# Patient Record
Sex: MALE | Race: WHITE | Employment: UNEMPLOYED | URBAN - METROPOLITAN AREA
[De-identification: names, ages, dates, MRNs, and addresses within clinical notes are randomized per-mention and may not be internally consistent; named-entity substitution may affect disease eponyms.]

---

## 2021-01-01 ENCOUNTER — OFFICE VISIT (OUTPATIENT)
Dept: FAMILY MEDICINE CLINIC | Age: 56
End: 2021-01-01
Payer: COMMERCIAL

## 2021-01-01 ENCOUNTER — HOSPITAL ENCOUNTER (OUTPATIENT)
Dept: PHYSICAL THERAPY | Age: 56
Setting detail: THERAPIES SERIES
Discharge: HOME | End: 2021-11-08
Payer: COMMERCIAL

## 2021-01-01 ENCOUNTER — HOSPITAL ENCOUNTER (OUTPATIENT)
Dept: PHYSICAL THERAPY | Age: 56
Setting detail: THERAPIES SERIES
Discharge: HOME | End: 2021-12-07
Payer: COMMERCIAL

## 2021-01-01 ENCOUNTER — TELEPHONE (OUTPATIENT)
Dept: FAMILY MEDICINE CLINIC | Age: 56
End: 2021-01-01

## 2021-01-01 ENCOUNTER — HOSPITAL ENCOUNTER (OUTPATIENT)
Dept: PHYSICAL THERAPY | Age: 56
Discharge: HOME | End: 2021-12-27

## 2021-01-01 ENCOUNTER — HOSPITAL ENCOUNTER (OUTPATIENT)
Dept: PHYSICAL THERAPY | Age: 56
Setting detail: THERAPIES SERIES
Discharge: HOME | End: 2021-12-16
Payer: COMMERCIAL

## 2021-01-01 ENCOUNTER — HOSPITAL ENCOUNTER (OUTPATIENT)
Dept: PHYSICAL THERAPY | Age: 56
Setting detail: THERAPIES SERIES
Discharge: HOME | End: 2021-11-19
Payer: COMMERCIAL

## 2021-01-01 ENCOUNTER — OFFICE VISIT (OUTPATIENT)
Dept: GASTROENTEROLOGY | Age: 56
End: 2021-01-01
Payer: COMMERCIAL

## 2021-01-01 ENCOUNTER — HOSPITAL ENCOUNTER (OUTPATIENT)
Dept: PHYSICAL THERAPY | Age: 56
Setting detail: THERAPIES SERIES
Discharge: HOME | End: 2021-11-16
Payer: COMMERCIAL

## 2021-01-01 ENCOUNTER — VIRTUAL VISIT (OUTPATIENT)
Dept: FAMILY MEDICINE CLINIC | Age: 56
End: 2021-01-01
Payer: COMMERCIAL

## 2021-01-01 ENCOUNTER — HOSPITAL ENCOUNTER (OUTPATIENT)
Dept: PHYSICAL THERAPY | Age: 56
Setting detail: THERAPIES SERIES
Discharge: HOME | End: 2021-11-10
Payer: COMMERCIAL

## 2021-01-01 ENCOUNTER — HOSPITAL ENCOUNTER (OUTPATIENT)
Dept: PHYSICAL THERAPY | Age: 56
Discharge: HOME | End: 2021-12-03

## 2021-01-01 ENCOUNTER — HOSPITAL ENCOUNTER (OUTPATIENT)
Dept: PHYSICAL THERAPY | Age: 56
Setting detail: THERAPIES SERIES
Discharge: HOME | End: 2021-11-30
Payer: COMMERCIAL

## 2021-01-01 ENCOUNTER — HOSPITAL ENCOUNTER (OUTPATIENT)
Dept: PHYSICAL THERAPY | Age: 56
Setting detail: THERAPIES SERIES
Discharge: HOME | End: 2021-12-20
Payer: COMMERCIAL

## 2021-01-01 ENCOUNTER — HOSPITAL ENCOUNTER (OUTPATIENT)
Dept: SLEEP CENTER | Age: 56
Discharge: HOME | End: 2021-11-02
Payer: COMMERCIAL

## 2021-01-01 ENCOUNTER — HOSPITAL ENCOUNTER (OUTPATIENT)
Dept: PHYSICAL THERAPY | Age: 56
Setting detail: THERAPIES SERIES
Discharge: HOME | End: 2021-11-23
Payer: COMMERCIAL

## 2021-01-01 ENCOUNTER — HOSPITAL ENCOUNTER (OUTPATIENT)
Dept: SLEEP CENTER | Age: 56
Discharge: HOME | End: 2021-12-01
Payer: COMMERCIAL

## 2021-01-01 ENCOUNTER — PREP FOR PROCEDURE (OUTPATIENT)
Dept: GASTROENTEROLOGY | Age: 56
End: 2021-01-01

## 2021-01-01 ENCOUNTER — HOSPITAL ENCOUNTER (OUTPATIENT)
Dept: PHYSICAL THERAPY | Age: 56
Setting detail: THERAPIES SERIES
Discharge: HOME | End: 2021-12-09
Payer: COMMERCIAL

## 2021-01-01 VITALS
HEIGHT: 69 IN | DIASTOLIC BLOOD PRESSURE: 78 MMHG | OXYGEN SATURATION: 98 % | TEMPERATURE: 97.1 F | SYSTOLIC BLOOD PRESSURE: 122 MMHG | BODY MASS INDEX: 27.91 KG/M2 | WEIGHT: 188.4 LBS | HEART RATE: 75 BPM

## 2021-01-01 VITALS — WEIGHT: 182 LBS | BODY MASS INDEX: 25.48 KG/M2 | HEIGHT: 71 IN

## 2021-01-01 VITALS
WEIGHT: 186.6 LBS | OXYGEN SATURATION: 96 % | BODY MASS INDEX: 26.03 KG/M2 | SYSTOLIC BLOOD PRESSURE: 112 MMHG | TEMPERATURE: 97.2 F | DIASTOLIC BLOOD PRESSURE: 72 MMHG | HEART RATE: 61 BPM

## 2021-01-01 VITALS
BODY MASS INDEX: 25.63 KG/M2 | DIASTOLIC BLOOD PRESSURE: 70 MMHG | TEMPERATURE: 97.2 F | HEART RATE: 90 BPM | SYSTOLIC BLOOD PRESSURE: 122 MMHG | OXYGEN SATURATION: 99 % | WEIGHT: 183.8 LBS

## 2021-01-01 VITALS
TEMPERATURE: 97.5 F | HEART RATE: 75 BPM | DIASTOLIC BLOOD PRESSURE: 72 MMHG | OXYGEN SATURATION: 92 % | WEIGHT: 185.6 LBS | BODY MASS INDEX: 25.98 KG/M2 | HEIGHT: 71 IN | SYSTOLIC BLOOD PRESSURE: 112 MMHG

## 2021-01-01 DIAGNOSIS — S22.080D OPEN WEDGE COMPRESSION FRACTURE OF T11 VERTEBRA WITH ROUTINE HEALING, SUBSEQUENT ENCOUNTER: ICD-10-CM

## 2021-01-01 DIAGNOSIS — F33.1 MODERATE EPISODE OF RECURRENT MAJOR DEPRESSIVE DISORDER (HCC): ICD-10-CM

## 2021-01-01 DIAGNOSIS — R19.5 POSITIVE FIT (FECAL IMMUNOCHEMICAL TEST): Primary | ICD-10-CM

## 2021-01-01 DIAGNOSIS — Z12.12 SCREENING FOR COLORECTAL CANCER: ICD-10-CM

## 2021-01-01 DIAGNOSIS — K92.1 BLOOD IN STOOL: ICD-10-CM

## 2021-01-01 DIAGNOSIS — G40.109 SYMPTOMATIC LOCALIZATION-RELATED EPILEPSY (HCC): ICD-10-CM

## 2021-01-01 DIAGNOSIS — E78.49 OTHER HYPERLIPIDEMIA: ICD-10-CM

## 2021-01-01 DIAGNOSIS — F33.1 MODERATE EPISODE OF RECURRENT MAJOR DEPRESSIVE DISORDER (HCC): Primary | ICD-10-CM

## 2021-01-01 DIAGNOSIS — I65.21 STENOSIS OF RIGHT CAROTID ARTERY: ICD-10-CM

## 2021-01-01 DIAGNOSIS — Z00.00 ENCOUNTER FOR WELL ADULT EXAM WITHOUT ABNORMAL FINDINGS: Primary | ICD-10-CM

## 2021-01-01 DIAGNOSIS — Q04.8 MALFORMATION OF CORTICAL DEVELOPMENT OF BRAIN (HCC): ICD-10-CM

## 2021-01-01 DIAGNOSIS — Z12.11 SCREENING FOR COLORECTAL CANCER: ICD-10-CM

## 2021-01-01 DIAGNOSIS — Z01.818 PREOP TESTING: ICD-10-CM

## 2021-01-01 DIAGNOSIS — S22.080D: ICD-10-CM

## 2021-01-01 DIAGNOSIS — G47.33 OSA (OBSTRUCTIVE SLEEP APNEA): ICD-10-CM

## 2021-01-01 DIAGNOSIS — Z11.4 SCREENING FOR HIV (HUMAN IMMUNODEFICIENCY VIRUS): ICD-10-CM

## 2021-01-01 DIAGNOSIS — Z13.1 SCREENING FOR DIABETES MELLITUS: ICD-10-CM

## 2021-01-01 DIAGNOSIS — R19.5 POSITIVE FIT (FECAL IMMUNOCHEMICAL TEST): ICD-10-CM

## 2021-01-01 DIAGNOSIS — J30.9 ALLERGIC RHINITIS, UNSPECIFIED SEASONALITY, UNSPECIFIED TRIGGER: ICD-10-CM

## 2021-01-01 DIAGNOSIS — Z11.59 NEED FOR HEPATITIS C SCREENING TEST: ICD-10-CM

## 2021-01-01 DIAGNOSIS — G89.29 CHRONIC RIGHT SHOULDER PAIN: ICD-10-CM

## 2021-01-01 DIAGNOSIS — U07.1 COVID-19: Primary | ICD-10-CM

## 2021-01-01 DIAGNOSIS — G47.30 SLEEP APNEA, UNSPECIFIED TYPE: ICD-10-CM

## 2021-01-01 DIAGNOSIS — G47.33 OSA (OBSTRUCTIVE SLEEP APNEA): Primary | ICD-10-CM

## 2021-01-01 DIAGNOSIS — Z96.89 S/P PLACEMENT OF VNS (VAGUS NERVE STIMULATION) DEVICE: ICD-10-CM

## 2021-01-01 DIAGNOSIS — R53.83 FATIGUE, UNSPECIFIED TYPE: ICD-10-CM

## 2021-01-01 DIAGNOSIS — M25.511 CHRONIC RIGHT SHOULDER PAIN: ICD-10-CM

## 2021-01-01 LAB
A/G RATIO: 2 (ref 1.1–2.2)
ALBUMIN SERPL-MCNC: 4.8 G/DL (ref 3.4–5)
ALP BLD-CCNC: 95 U/L (ref 40–129)
ALT SERPL-CCNC: 13 U/L (ref 10–40)
ANION GAP SERPL CALCULATED.3IONS-SCNC: 11 MMOL/L (ref 3–16)
AST SERPL-CCNC: 26 U/L (ref 15–37)
BASOPHILS ABSOLUTE: 0 K/UL (ref 0–0.2)
BASOPHILS RELATIVE PERCENT: 0.9 %
BILIRUB SERPL-MCNC: 0.6 MG/DL (ref 0–1)
BUN BLDV-MCNC: 16 MG/DL (ref 7–20)
CALCIUM SERPL-MCNC: 9.5 MG/DL (ref 8.3–10.6)
CHLORIDE BLD-SCNC: 110 MMOL/L (ref 99–110)
CHOLESTEROL, TOTAL: 264 MG/DL (ref 0–199)
CO2: 21 MMOL/L (ref 21–32)
CONTROL: ABNORMAL
CREAT SERPL-MCNC: 1.3 MG/DL (ref 0.9–1.3)
EOSINOPHILS ABSOLUTE: 0.3 K/UL (ref 0–0.6)
EOSINOPHILS RELATIVE PERCENT: 6.6 %
ESTIMATED AVERAGE GLUCOSE: 105.4 MG/DL
GFR AFRICAN AMERICAN: >60
GFR NON-AFRICAN AMERICAN: 57
GLOBULIN: 2.4 G/DL
GLUCOSE BLD-MCNC: 89 MG/DL (ref 70–99)
HBA1C MFR BLD: 5.3 %
HCT VFR BLD CALC: 42.9 % (ref 40.5–52.5)
HDLC SERPL-MCNC: 51 MG/DL (ref 40–60)
HEMOCCULT STL QL: POSITIVE
HEMOGLOBIN: 14.4 G/DL (ref 13.5–17.5)
HEPATITIS C ANTIBODY INTERPRETATION: NORMAL
LDL CHOLESTEROL CALCULATED: 188 MG/DL
LYMPHOCYTES ABSOLUTE: 1.5 K/UL (ref 1–5.1)
LYMPHOCYTES RELATIVE PERCENT: 29.1 %
MCH RBC QN AUTO: 32.9 PG (ref 26–34)
MCHC RBC AUTO-ENTMCNC: 33.6 G/DL (ref 31–36)
MCV RBC AUTO: 97.8 FL (ref 80–100)
MONOCYTES ABSOLUTE: 0.4 K/UL (ref 0–1.3)
MONOCYTES RELATIVE PERCENT: 7.9 %
NEUTROPHILS ABSOLUTE: 2.8 K/UL (ref 1.7–7.7)
NEUTROPHILS RELATIVE PERCENT: 55.5 %
PDW BLD-RTO: 13.1 % (ref 12.4–15.4)
PLATELET # BLD: 195 K/UL (ref 135–450)
PMV BLD AUTO: 7.8 FL (ref 5–10.5)
POTASSIUM SERPL-SCNC: 4.4 MMOL/L (ref 3.5–5.1)
RBC # BLD: 4.38 M/UL (ref 4.2–5.9)
SODIUM BLD-SCNC: 142 MMOL/L (ref 136–145)
STATUS: NORMAL
T4 FREE: 1 NG/DL (ref 0.9–1.8)
TOTAL PROTEIN: 7.2 G/DL (ref 6.4–8.2)
TRIGL SERPL-MCNC: 126 MG/DL (ref 0–150)
TSH REFLEX FT4: 4.44 UIU/ML (ref 0.27–4.2)
VLDLC SERPL CALC-MCNC: 25 MG/DL
WBC # BLD: 5 K/UL (ref 4–11)

## 2021-01-01 PROCEDURE — 36415 COLL VENOUS BLD VENIPUNCTURE: CPT | Performed by: FAMILY MEDICINE

## 2021-01-01 PROCEDURE — 97016 VASOPNEUMATIC DEVICE THERAPY: CPT

## 2021-01-01 PROCEDURE — 99211 OFF/OP EST MAY X REQ PHY/QHP: CPT

## 2021-01-01 PROCEDURE — 97140 MANUAL THERAPY 1/> REGIONS: CPT

## 2021-01-01 PROCEDURE — 3017F COLORECTAL CA SCREEN DOC REV: CPT | Performed by: NURSE PRACTITIONER

## 2021-01-01 PROCEDURE — 97110 THERAPEUTIC EXERCISES: CPT

## 2021-01-01 PROCEDURE — 99204 OFFICE O/P NEW MOD 45 MIN: CPT | Performed by: NURSE PRACTITIONER

## 2021-01-01 PROCEDURE — 1036F TOBACCO NON-USER: CPT | Performed by: FAMILY MEDICINE

## 2021-01-01 PROCEDURE — G8419 CALC BMI OUT NRM PARAM NOF/U: HCPCS | Performed by: NURSE PRACTITIONER

## 2021-01-01 PROCEDURE — 3017F COLORECTAL CA SCREEN DOC REV: CPT | Performed by: FAMILY MEDICINE

## 2021-01-01 PROCEDURE — 1036F TOBACCO NON-USER: CPT | Performed by: NURSE PRACTITIONER

## 2021-01-01 PROCEDURE — G8419 CALC BMI OUT NRM PARAM NOF/U: HCPCS | Performed by: FAMILY MEDICINE

## 2021-01-01 PROCEDURE — G8427 DOCREV CUR MEDS BY ELIG CLIN: HCPCS | Performed by: FAMILY MEDICINE

## 2021-01-01 PROCEDURE — 99214 OFFICE O/P EST MOD 30 MIN: CPT | Performed by: FAMILY MEDICINE

## 2021-01-01 PROCEDURE — 99396 PREV VISIT EST AGE 40-64: CPT | Performed by: FAMILY MEDICINE

## 2021-01-01 PROCEDURE — 97112 NEUROMUSCULAR REEDUCATION: CPT

## 2021-01-01 PROCEDURE — 99213 OFFICE O/P EST LOW 20 MIN: CPT | Performed by: FAMILY MEDICINE

## 2021-01-01 PROCEDURE — G8427 DOCREV CUR MEDS BY ELIG CLIN: HCPCS | Performed by: NURSE PRACTITIONER

## 2021-01-01 PROCEDURE — 97161 PT EVAL LOW COMPLEX 20 MIN: CPT

## 2021-01-01 PROCEDURE — 99204 OFFICE O/P NEW MOD 45 MIN: CPT | Performed by: FAMILY MEDICINE

## 2021-01-01 PROCEDURE — G8484 FLU IMMUNIZE NO ADMIN: HCPCS | Performed by: NURSE PRACTITIONER

## 2021-01-01 PROCEDURE — 99212 OFFICE O/P EST SF 10 MIN: CPT | Performed by: FAMILY MEDICINE

## 2021-01-01 PROCEDURE — G8484 FLU IMMUNIZE NO ADMIN: HCPCS | Performed by: FAMILY MEDICINE

## 2021-01-01 PROCEDURE — 95810 POLYSOM 6/> YRS 4/> PARAM: CPT

## 2021-01-01 PROCEDURE — 99386 PREV VISIT NEW AGE 40-64: CPT | Performed by: FAMILY MEDICINE

## 2021-01-01 PROCEDURE — 82274 ASSAY TEST FOR BLOOD FECAL: CPT | Performed by: FAMILY MEDICINE

## 2021-01-01 RX ORDER — SODIUM CHLORIDE 9 MG/ML
25 INJECTION, SOLUTION INTRAVENOUS PRN
Status: CANCELLED | OUTPATIENT
Start: 2021-01-01

## 2021-01-01 RX ORDER — ATORVASTATIN CALCIUM 40 MG/1
40 TABLET, FILM COATED ORAL DAILY
Qty: 90 TABLET | Refills: 1 | Status: SHIPPED | OUTPATIENT
Start: 2021-01-01 | End: 2021-01-01 | Stop reason: SDUPTHER

## 2021-01-01 RX ORDER — SODIUM CHLORIDE 0.9 % (FLUSH) 0.9 %
5-40 SYRINGE (ML) INJECTION EVERY 12 HOURS SCHEDULED
Status: CANCELLED | OUTPATIENT
Start: 2021-01-01

## 2021-01-01 RX ORDER — SERTRALINE HYDROCHLORIDE 25 MG/1
25 TABLET, FILM COATED ORAL DAILY
Qty: 30 TABLET | Refills: 1 | Status: SHIPPED | OUTPATIENT
Start: 2021-01-01 | End: 2021-01-01

## 2021-01-01 RX ORDER — CLONAZEPAM 0.5 MG/1
1 TABLET, ORALLY DISINTEGRATING ORAL 2 TIMES DAILY PRN
COMMUNITY
Start: 2021-01-01

## 2021-01-01 RX ORDER — SERTRALINE HYDROCHLORIDE 25 MG/1
TABLET, FILM COATED ORAL
Qty: 30 TABLET | Refills: 1 | Status: SHIPPED | OUTPATIENT
Start: 2021-01-01 | End: 2021-01-01 | Stop reason: SDUPTHER

## 2021-01-01 RX ORDER — POLYETHYLENE GLYCOL 3350 17 G/17G
238 POWDER, FOR SOLUTION ORAL ONCE
Qty: 238 G | Refills: 0 | Status: SHIPPED | OUTPATIENT
Start: 2021-01-01 | End: 2021-01-01

## 2021-01-01 RX ORDER — SERTRALINE HYDROCHLORIDE 25 MG/1
25 TABLET, FILM COATED ORAL DAILY
Qty: 90 TABLET | Refills: 0 | Status: SHIPPED | OUTPATIENT
Start: 2021-01-01

## 2021-01-01 RX ORDER — CLOBAZAM 20 MG/1
1 TABLET ORAL 2 TIMES DAILY
COMMUNITY
Start: 2021-01-01

## 2021-01-01 RX ORDER — ATORVASTATIN CALCIUM 80 MG/1
80 TABLET, FILM COATED ORAL DAILY
Qty: 90 TABLET | Refills: 2 | Status: SHIPPED | OUTPATIENT
Start: 2021-01-01

## 2021-01-01 RX ORDER — SODIUM CHLORIDE 0.9 % (FLUSH) 0.9 %
5-40 SYRINGE (ML) INJECTION PRN
Status: CANCELLED | OUTPATIENT
Start: 2021-01-01

## 2021-01-01 RX ORDER — BISACODYL 5 MG
TABLET, DELAYED RELEASE (ENTERIC COATED) ORAL
Qty: 4 TABLET | Refills: 0 | Status: SHIPPED | OUTPATIENT
Start: 2021-01-01

## 2021-01-01 RX ORDER — SODIUM CHLORIDE, SODIUM LACTATE, POTASSIUM CHLORIDE, CALCIUM CHLORIDE 600; 310; 30; 20 MG/100ML; MG/100ML; MG/100ML; MG/100ML
INJECTION, SOLUTION INTRAVENOUS CONTINUOUS
Status: CANCELLED | OUTPATIENT
Start: 2021-01-01

## 2021-01-01 SDOH — ECONOMIC STABILITY: TRANSPORTATION INSECURITY
IN THE PAST 12 MONTHS, HAS LACK OF TRANSPORTATION KEPT YOU FROM MEETINGS, WORK, OR FROM GETTING THINGS NEEDED FOR DAILY LIVING?: NO

## 2021-01-01 SDOH — ECONOMIC STABILITY: FOOD INSECURITY: WITHIN THE PAST 12 MONTHS, THE FOOD YOU BOUGHT JUST DIDN'T LAST AND YOU DIDN'T HAVE MONEY TO GET MORE.: NEVER TRUE

## 2021-01-01 SDOH — ECONOMIC STABILITY: TRANSPORTATION INSECURITY
IN THE PAST 12 MONTHS, HAS THE LACK OF TRANSPORTATION KEPT YOU FROM MEDICAL APPOINTMENTS OR FROM GETTING MEDICATIONS?: NO

## 2021-01-01 SDOH — ECONOMIC STABILITY: FOOD INSECURITY: WITHIN THE PAST 12 MONTHS, YOU WORRIED THAT YOUR FOOD WOULD RUN OUT BEFORE YOU GOT MONEY TO BUY MORE.: NEVER TRUE

## 2021-01-01 ASSESSMENT — ENCOUNTER SYMPTOMS
EYE PAIN: 0
ABDOMINAL PAIN: 0
NAUSEA: 0
EYE DISCHARGE: 0
VOMITING: 0
COUGH: 0
NAUSEA: 0
VOMITING: 0
BLOOD IN STOOL: 1
COUGH: 1
CONSTIPATION: 0
VOMITING: 0
WHEEZING: 0
ABDOMINAL PAIN: 0
SHORTNESS OF BREATH: 0
SHORTNESS OF BREATH: 0
CONSTIPATION: 0
TROUBLE SWALLOWING: 0
NAUSEA: 0
COLOR CHANGE: 0
DIARRHEA: 0
VOMITING: 0
COUGH: 0
DIARRHEA: 0
VOMITING: 0
CONSTIPATION: 0
EYE PAIN: 0
SHORTNESS OF BREATH: 0
APNEA: 1
COUGH: 0
BACK PAIN: 1
COUGH: 0
SHORTNESS OF BREATH: 0
ABDOMINAL PAIN: 0
DIARRHEA: 0
EYE PAIN: 0
DIARRHEA: 0
DIARRHEA: 0
TROUBLE SWALLOWING: 0
BACK PAIN: 1
SHORTNESS OF BREATH: 0
ABDOMINAL PAIN: 0
BACK PAIN: 1

## 2021-01-01 ASSESSMENT — PAIN DESCRIPTION - DIRECTION: RADIATING_TOWARDS: ELBOW

## 2021-01-01 ASSESSMENT — PAIN DESCRIPTION - LOCATION: LOCATION: SHOULDER

## 2021-01-01 ASSESSMENT — COLUMBIA-SUICIDE SEVERITY RATING SCALE - C-SSRS
2. HAVE YOU ACTUALLY HAD ANY THOUGHTS OF KILLING YOURSELF?: NO
1. WITHIN THE PAST MONTH, HAVE YOU WISHED YOU WERE DEAD OR WISHED YOU COULD GO TO SLEEP AND NOT WAKE UP?: NO
6. HAVE YOU EVER DONE ANYTHING, STARTED TO DO ANYTHING, OR PREPARED TO DO ANYTHING TO END YOUR LIFE?: NO

## 2021-01-01 ASSESSMENT — SLEEP AND FATIGUE QUESTIONNAIRES
HOW LIKELY ARE YOU TO NOD OFF OR FALL ASLEEP WHILE WATCHING TV: 1
HOW LIKELY ARE YOU TO NOD OFF OR FALL ASLEEP WHILE SITTING QUIETLY AFTER LUNCH WITHOUT ALCOHOL: 3
HOW LIKELY ARE YOU TO NOD OFF OR FALL ASLEEP WHEN YOU ARE A PASSENGER IN A CAR FOR AN HOUR WITHOUT A BREAK: 3
HOW LIKELY ARE YOU TO NOD OFF OR FALL ASLEEP WHILE SITTING INACTIVE IN A PUBLIC PLACE: 0
HOW LIKELY ARE YOU TO NOD OFF OR FALL ASLEEP WHILE LYING DOWN TO REST IN THE AFTERNOON WHEN CIRCUMSTANCES PERMIT: 3
HOW LIKELY ARE YOU TO NOD OFF OR FALL ASLEEP WHILE SITTING QUIETLY AFTER LUNCH WITHOUT ALCOHOL: 3
HOW LIKELY ARE YOU TO NOD OFF OR FALL ASLEEP IN A CAR, WHILE STOPPED FOR A FEW MINUTES IN TRAFFIC: 0
HOW LIKELY ARE YOU TO NOD OFF OR FALL ASLEEP WHILE SITTING AND TALKING TO SOMEONE: 0
HOW LIKELY ARE YOU TO NOD OFF OR FALL ASLEEP WHILE SITTING AND TALKING TO SOMEONE: 0
HOW LIKELY ARE YOU TO NOD OFF OR FALL ASLEEP WHILE SITTING INACTIVE IN A PUBLIC PLACE: 0
HOW LIKELY ARE YOU TO NOD OFF OR FALL ASLEEP IN A CAR, WHILE STOPPED FOR A FEW MINUTES IN TRAFFIC: 0
HOW LIKELY ARE YOU TO NOD OFF OR FALL ASLEEP WHILE SITTING AND READING: 2
HOW LIKELY ARE YOU TO NOD OFF OR FALL ASLEEP WHILE LYING DOWN TO REST IN THE AFTERNOON WHEN CIRCUMSTANCES PERMIT: 3
ESS TOTAL SCORE: 12
ESS TOTAL SCORE: 12
NECK CIRCUMFERENCE (INCHES): 16
HOW LIKELY ARE YOU TO NOD OFF OR FALL ASLEEP WHEN YOU ARE A PASSENGER IN A CAR FOR AN HOUR WITHOUT A BREAK: 3
NECK CIRCUMFERENCE (INCHES): 16
HOW LIKELY ARE YOU TO NOD OFF OR FALL ASLEEP WHILE SITTING AND READING: 2
HOW LIKELY ARE YOU TO NOD OFF OR FALL ASLEEP WHILE WATCHING TV: 1

## 2021-01-01 ASSESSMENT — PATIENT HEALTH QUESTIONNAIRE - PHQ9
SUM OF ALL RESPONSES TO PHQ QUESTIONS 1-9: 17
10. IF YOU CHECKED OFF ANY PROBLEMS, HOW DIFFICULT HAVE THESE PROBLEMS MADE IT FOR YOU TO DO YOUR WORK, TAKE CARE OF THINGS AT HOME, OR GET ALONG WITH OTHER PEOPLE: 0
9. THOUGHTS THAT YOU WOULD BE BETTER OFF DEAD, OR OF HURTING YOURSELF: 0
3. TROUBLE FALLING OR STAYING ASLEEP: 3
6. FEELING BAD ABOUT YOURSELF - OR THAT YOU ARE A FAILURE OR HAVE LET YOURSELF OR YOUR FAMILY DOWN: 3
SUM OF ALL RESPONSES TO PHQ QUESTIONS 1-9: 17
4. FEELING TIRED OR HAVING LITTLE ENERGY: 3
5. POOR APPETITE OR OVEREATING: 2
SUM OF ALL RESPONSES TO PHQ9 QUESTIONS 1 & 2: 6
SUM OF ALL RESPONSES TO PHQ QUESTIONS 1-9: 17
2. FEELING DOWN, DEPRESSED OR HOPELESS: 3
1. LITTLE INTEREST OR PLEASURE IN DOING THINGS: 3
7. TROUBLE CONCENTRATING ON THINGS, SUCH AS READING THE NEWSPAPER OR WATCHING TELEVISION: 0
8. MOVING OR SPEAKING SO SLOWLY THAT OTHER PEOPLE COULD HAVE NOTICED. OR THE OPPOSITE, BEING SO FIGETY OR RESTLESS THAT YOU HAVE BEEN MOVING AROUND A LOT MORE THAN USUAL: 0

## 2021-01-01 ASSESSMENT — PAIN DESCRIPTION - PAIN TYPE: TYPE: CHRONIC PAIN

## 2021-01-01 ASSESSMENT — PAIN DESCRIPTION - FREQUENCY: FREQUENCY: INTERMITTENT

## 2021-01-01 ASSESSMENT — PAIN - FUNCTIONAL ASSESSMENT: PAIN_FUNCTIONAL_ASSESSMENT: PREVENTS OR INTERFERES SOME ACTIVE ACTIVITIES AND ADLS

## 2021-01-01 ASSESSMENT — PAIN DESCRIPTION - ONSET: ONSET: GRADUAL

## 2021-01-01 ASSESSMENT — SOCIAL DETERMINANTS OF HEALTH (SDOH): HOW HARD IS IT FOR YOU TO PAY FOR THE VERY BASICS LIKE FOOD, HOUSING, MEDICAL CARE, AND HEATING?: NOT HARD AT ALL

## 2021-01-01 ASSESSMENT — PAIN DESCRIPTION - ORIENTATION: ORIENTATION: RIGHT

## 2021-10-21 PROBLEM — F33.1 MODERATE EPISODE OF RECURRENT MAJOR DEPRESSIVE DISORDER (HCC): Status: ACTIVE | Noted: 2021-01-22

## 2021-10-21 PROBLEM — M25.511 CHRONIC RIGHT SHOULDER PAIN: Status: ACTIVE | Noted: 2021-01-01

## 2021-10-21 PROBLEM — G89.29 CHRONIC RIGHT SHOULDER PAIN: Status: ACTIVE | Noted: 2021-01-01

## 2021-10-21 PROBLEM — Z96.89 S/P PLACEMENT OF VNS (VAGUS NERVE STIMULATION) DEVICE: Status: ACTIVE | Noted: 2021-01-21

## 2021-10-21 PROBLEM — I65.21 STENOSIS OF RIGHT CAROTID ARTERY: Status: ACTIVE | Noted: 2021-01-21

## 2021-10-21 PROBLEM — F39 MOOD DISORDER (HCC): Status: ACTIVE | Noted: 2021-01-22

## 2021-10-21 PROBLEM — E78.49 OTHER HYPERLIPIDEMIA: Status: ACTIVE | Noted: 2021-01-01

## 2021-10-21 PROBLEM — S22.000B: Status: ACTIVE | Noted: 2021-01-01

## 2021-10-21 PROBLEM — G40.109 SYMPTOMATIC LOCALIZATION-RELATED EPILEPSY (HCC): Status: ACTIVE | Noted: 2017-03-23

## 2021-10-21 PROBLEM — J30.9 RHINITIS, ALLERGIC: Status: ACTIVE | Noted: 2017-03-23

## 2021-10-21 NOTE — PATIENT INSTRUCTIONS
(Maybe you're afraid of having pain, losing your independence, or being kept alive by machines.)  · Where would you prefer to die? (Your home? A hospital? A nursing home?)  · Do you want to donate your organs when you die? · Do you want certain Anglican practices performed before you die? When should you call for help? Be sure to contact your doctor if you have any questions. Where can you learn more? Go to [email or web address]://[email or web address] org and sign in to your Gecko TV account. Enter R264 in the ListMinut box to learn more about \"Advance Directives: Care Instructions. \"     If you do not have an account, please click on the \"Sign Up Now\" link. Current as of: March 17, 2021               Content Version: 13.0  © 2006-2021 Healthwise, [employer]. Care instructions adapted under license by [facility] ([facility]). If you have questions about a medical condition or this instruction, always ask your healthcare professional. [place] [address] any warranty or liability for your use of this information. Learning About Medical Power of   What is a medical power of ? A medical power of , also called a durable power of  for health care, is one type of the legal forms called advance directives. It lets you name the person you want to make treatment decisions for you if you can't speak or decide for yourself. The person you choose is called your health care agent. This person is also called a health care proxy or health care surrogate. A medical power of  may be called something else in your state. How do you choose a health care agent? Choose your health care agent carefully. This person may or may not be a family member. Talk to the person before you make your final decision. Make sure he or she is comfortable with this responsibility. It's a good idea to choose someone who:  · Is at least 25years old.   · Knows you well and understands what makes life meaningful for you. · Understands your Denominational and moral values. · Will do what you want, not what he or she wants. · Will be able to make difficult choices at a stressful time. · Will be able to refuse or stop treatment, if that is what you would want, even if you could die. · Will be firm and confident with health professionals if needed. · Will ask questions to get needed information. · Lives near you or agrees to travel to you if needed. Your family may help you make medical decisions while you can still be part of that process. But it's important to choose one person to be your health care agent in case you aren't able to make decisions for yourself. If you don't fill out the legal form and name a health care agent, the decisions your family can make may be limited. A health care agent may be called something else in your state. Who will make decisions for you if you don't have a health care agent? If you don't have a health care agent or a living will, you may not get the care you want. Decisions may be made by family members who disagree about your medical care. Or decisions may be made by a medical professional who doesn't know you well. In some cases, a  makes the decisions. When you name a health care agent, it is very clear who has the power to make health decisions for you. How do you name a health care agent? You name your health care agent on a legal form. This form is usually called a medical power of . Ask your hospital, state bar association, or office on aging where to find these forms. You must sign the form to make it legal. Some states require you to get the form notarized. This means that a person called a  watches you sign the form and then he or she signs the form. Some states also require that two or more witnesses sign the form. Be sure to tell your family members and doctors who your health care agent is.   Where can living will. · Some states may limit your right to refuse treatment in certain cases. For example, you may need to clearly state in your living will that you don't want artificial hydration and nutrition, such as being fed through a tube. Is a living will a legal document? A living will is a legal document. Each state has its own laws about living de la garza. And a living will may be called something else in your state. Here are some things to know about living de la garza. · You don't need an  to complete a living will. But legal advice can be helpful if your state's laws are unclear. It can also help if your health history is complicated or your family can't agree on what should be in your living will. · You can change your living will at any time. Some people find that their wishes about end-of-life care change as their health changes. If you make big changes to your living will, complete a new form. · If you move to another state, make sure that your living will is legal in the state where you now live. In most cases, doctors will respect your wishes even if you have a form from a different state. · You might use a universal form that has been approved by many states. This kind of form can sometimes be filled out and stored online. Your digital copy will then be available wherever you have a connection to the internet. The doctors and nurses who need to treat you can find it right away. · Your state may offer an online registry. This is another place where you can store your living will online. · It's a good idea to get your living will notarized. This means using a person called a  to watch two people sign, or witness, your living will. What should you know when you create a living will? Here are some questions to ask yourself as you make your living will:  · Do you know enough about life support methods that might be used?  If not, talk to your doctor so you know what might be done if you can't breathe on your own, your heart stops, or you can't swallow. · What things would you still want to be able to do after you receive life-support methods? Would you want to be able to walk? To speak? To eat on your own? To live without the help of machines? · Do you want certain Faith practices performed if you become very ill? · If you have a choice, where do you want to be cared for? In your home? At a hospital or nursing home? · If you have a choice at the end of your life, where would you prefer to die? At home? In a hospital or nursing home? Somewhere else? · Would you prefer to be buried or cremated? · Do you want your organs to be donated after you die? What should you do with your living will? · Make sure that your family members and your health care agent have copies of your living will (also called a declaration). · Give your doctor a copy of your living will. Ask him or her to keep it as part of your medical record. If you have more than one doctor, make sure that each one has a copy. · Put a copy of your living will where it can be easily found. For example, some people may put a copy on their refrigerator door. If you are using a digital copy, be sure your doctor, family members, and health care agent know how to find and access it. Where can you learn more? Go to https://TruVitalspepiceweb.ePACT Network. org and sign in to your [employer] Vision account. Enter X152 in the [facility] box to learn more about \"Learning About Living Alex. \"     If you do not have an account, please click on the \"Sign Up Now\" link. Current as of: March 17, 2021               Content Version: 13.0  © 4551-2414 Healthwise, Incorporated. Care instructions adapted under license by [facility] ([facility]). If you have questions about a medical condition or this instruction, always ask your healthcare professional. [address] any warranty or liability for your use of this information. Well Visit, Men 48 to 72: Care Instructions  Overview     Well visits can help you stay healthy. Your doctor has checked your overall health and may have suggested ways to take good care of yourself. Your doctor also may have recommended tests. At home, you can help prevent illness with healthy eating, regular exercise, and other steps. Follow-up care is a key part of your treatment and safety. Be sure to make and go to all appointments, and call your doctor if you are having problems. It's also a good idea to know your test results and keep a list of the medicines you take. How can you care for yourself at home? · Get screening tests that you and your doctor decide on. Screening helps find diseases before any symptoms appear. · Eat healthy foods. Choose fruits, vegetables, whole grains, protein, and low-fat dairy foods. Limit fat, especially saturated fat. Reduce salt in your diet. · Limit alcohol. Have no more than 2 drinks a day or 14 drinks a week. · Get at least 30 minutes of exercise on most days of the week. Walking is a good choice. You also may want to do other activities, such as running, swimming, cycling, or playing tennis or team sports. · Reach and stay at a healthy weight. This will lower your risk for many problems, such as obesity, diabetes, heart disease, and high blood pressure. · Do not smoke. Smoking can make health problems worse. If you need help quitting, talk to your doctor about stop-smoking programs and medicines. These can increase your chances of quitting for good. · Care for your mental health. It is easy to get weighed down by worry and stress. Learn strategies to manage stress, like deep breathing and mindfulness, and stay connected with your family and community. If you find you often feel sad or hopeless, talk with your doctor. Treatment can help. · Talk to your doctor about whether you have any risk factors for sexually transmitted infections (STIs).  You can help information. WELL ADULT LIFESTYLE INSTRUCTIONS:    Priscila Kurtis a day in the next week to spend an hour reviewing the information below then:     1) determine your health goals for the year   2) determine what changes you need to achieve those goals   3) design your daily routine, shopping habits etc to implement those changes  => Default Right Action (no choices), Make it EASY to do the RIGHT THINGS. 4) I invite you to send me your plans via "[employer]" so I can continue to help you with them    Examine your lifestyle with an emphasis on BARRIERS to bad and good habits and how you can design your life to make better choices. If you want to feel better these are the FUNDAMENTAL PILLARS of Wellness:    1)  You can choose to Get 150 min/week of moderate exercise (can talk but can't sing) or 75 min/week of vigorous exercise (can't talk). This will enhance your sense of well being (Exercise is as good as medicine for depression.)    2)  You can choose to Get 7-9 hours of sleep per night    Detoxifies your brain, reduces risk of dementia    3)  You can choose to Strength Train 2 x a week on non-consecutive days   This will improve function and reduce risk of injury. Body weight type exercises such as Yoga and Pilates are excellent choices. 4)  You can choose Good Nutrition. Only eat your goal weight (in lbs) x 10 calories/day and get 5 servings of Vegetables/day   Plant based diets reduce risk of heart attack/stroke and will help you feel full on less food. Avoid highly processed foods and processed carbohydrates. 5)  You can choose Moderate alcohol intake < 1-2 drinks/day   Alcohol will disrupt your sleep and add calories to your day. 6)  You can choose to Develop a Charismatic/Supportive relationship. This will strengthen your resilience for the ups and downs. 7)  You can choose to Practice Mindfulness. An hour a day of prayer/meditation/gratitude will change your life!     If you are trying to lose weight, here are some recommendations for weight loss:  Not every weight loss program is appropriate for everybody. ..  good online sources include Noom (more social with daily check ins), Lifesum (similar but less social) and Naturally slim, as well as Brandneu ($1500)    The GI Diet or \"Primal diet\", Intermittent fasting can also be effective choices. If you have diabetes treated with insulin be sure to ask for specific guidance around meals. Take your desired weight in pounds and multiply by 10 and that is your average daily calorie allowance. For example if you wish to weigh 170 lb x 10 = 1700 raven/day (this is how to gradually lose the weight and maintain your desired weight). Avoid soda/coke and all \"wet carbs\" => Drink ice water instead    Drink a large glass of ice water before meals and EAT SLOWLY (talk while you eat)! Rethink your hunger => it means your losing weight. Minimize highly processed carbohydrates as they stimulate your appetite:  Specifically cut back on Bread, Rice, Pasta and Potatoes    Avoid eating calories after 6 pm      Patient Education        Carotid Stenosis: Care Instructions  Your Care Instructions     Carotid stenosis is narrowing of one or both of the carotid arteries. These arteries take blood from the heart to the brain. There is one on each side of the neck. A substance called plaque builds up inside an artery. This makes it too narrow. Plaque comes from damage to the artery over time. This damage may be caused by high blood pressure, high cholesterol, diabetes, or smoking. Sometimes plaque can break loose from the carotid artery and move to the brain. This can cause a stroke or transient ischemic attack (TIA). The goal of treatment is to lower your risk of having a stroke or TIA. You can lower your risk by making healthy lifestyle changes and taking medicine. Sometimes a surgery or procedure is done.   Follow-up care is a key part of your treatment and safety. Be sure to make and go to all appointments, and call your doctor if you are having problems. It's also a good idea to know your test results and keep a list of the medicines you take. How can you care for yourself at home? · Take your medicines exactly as prescribed. Call your doctor if you think you are having a problem with your medicine. You may take medicine to lower your blood pressure, to lower your cholesterol, or to prevent blood clots. · If you take a blood thinner, such as aspirin, be sure to get instructions about how to take your medicine safely. Blood thinners can cause serious bleeding problems. · Do not smoke. People who smoke have a higher chance of stroke than those who quit. If you need help quitting, talk to your doctor about stop-smoking programs and medicines. These can increase your chances of quitting for good. · Eat a healthy diet that is low in saturated fat and salt. Eat lots of fresh fruits and vegetables and foods high in fiber. · Stay at a healthy weight. Lose weight if you need to. · Talk to your doctor about starting an exercise program. Regular exercise lowers your chance of stroke. · Limit alcohol to 2 drinks a day for men and 1 drink a day for women. Too much alcohol can cause health problems. · Work with your doctor to control high blood pressure, high cholesterol, diabetes, and other conditions that increase your chance of a stroke. A healthy diet, exercise, weight loss (if needed), and medicines can help. · Avoid colds and flu. Get the flu vaccine every year. When should you call for help? Call 911 anytime you think you may need emergency care. For example, call if:    · You passed out (lost consciousness).     · You have symptoms of a stroke. These may include:  ? Sudden numbness, tingling, weakness, or loss of movement in your face, arm, or leg, especially on only one side of your body. ? Sudden vision changes. ? Sudden trouble speaking.   ? Sudden confusion or trouble understanding simple statements. ? Sudden problems with walking or balance. ? A sudden, severe headache that is different from past headaches. Call your doctor now or seek immediate medical care if:    · You are dizzy or lightheaded, or you feel like you may faint. Watch closely for changes in your health, and be sure to contact your doctor if you have any problems. Where can you learn more? Go to https://m0um0u.Parclick.com. org and sign in to your [employer] account. Enter O351 in the DealDash box to learn more about \"Carotid Stenosis: Care Instructions. \"     If you do not have an account, please click on the \"Sign Up Now\" link. Current as of: April 29, 2021               Content Version: 13.0  © 5120-7072 Healthwise, Incorporated. Care instructions adapted under license by [facility] ([facility]). If you have questions about a medical condition or this instruction, always ask your healthcare professional. [place] [address] any warranty or liability for your use of this information.

## 2021-10-21 NOTE — ASSESSMENT & PLAN NOTE
I will check a lipid panel today and expect that a cholesterol medication would be of benefit. He wanted to see the results before actually getting a prescription for a cholesterol medication.

## 2021-10-21 NOTE — ASSESSMENT & PLAN NOTE
He is in the process of getting to a counselor and has several names that were given to him by his insurance. I told him that I had be comfortable prescribing a medication of 1 was felt necessary and his counselor does not prescribe medication. I encouraged counseling since counseling has more of a permanent benefit than medication. He does not feel he needs a medication at this time and he is not suicidal or having thoughts of self-harm.

## 2021-10-21 NOTE — ASSESSMENT & PLAN NOTE
I will order lipid panel. I told him he likely should take a statin even if the lipids turn out fairly normal.  I explained that perhaps the previous doctor did not mean that 75% stenosis was normal but met that 75% was not quite meeting the criteria for surgical intervention.

## 2021-10-21 NOTE — ASSESSMENT & PLAN NOTE
I think he may simply be getting past the compression fracture slowly. I encouraged some stretching and increased activity. I will refer to an orthopedic specialist for a recheck. I am also referring to physical therapy though the physical therapy referral was primarily for his right upper arm.

## 2021-10-21 NOTE — PROGRESS NOTES
10/21/21    Adal Ree  1965    YFN    HPI - Lisa Kaur is a 64 y.o. male who presents today for evaluation of:  Chief Complaint   Patient presents with   Pasquale Quinn Doctor    Back Pain     injury 04/29/2021    Shoulder Pain     Back pain :   T11-12 compression fractures April 2021. They occurred with a seizure. Pain is still present. It is worse with certain activities. Mild with walking but was exacerbated with mowing lawn on riding mower. Right shoulder pain : He has right shoulder pain for the past several months. It hurts when he abducts his shoulder. It hurts with lifting both open handed or in a backhanded . The pain is in the area of the posterior right deltoid but goes down the upper arm about two thirds of the distance to the elbow. He is able to lift a substantial weight with the left arm but not with the right. Seizure disorder : He goes to an epileptologist at [facility]. He uses an emmy called [employer]. He worked full time until 4/2021 and was then off 3 months. He has applied for social security disability. His number of seizures dropped off to very few when he stopped working. When he was actively working he was having about 8 seizures a month and currently is only having about 1 a month. Screened for possible depression : He has been depressed about divorce. He has 2 daughters. Both daughters seem to have sided with wife and he is not part of the life of a pregnant daughter. He has lost a lot in the past few months. He lives with parents. He has had some mild depression in the past.  Currently he does not feel he needs any medication for depression. He has no suicidal thoughts or thoughts of hurting others. A lot of his current depression is related to the fact that he recently got  and stopped working and has moved in with his parents. He feels that his life is pretty good in front of others but sometimes he has crying spells when alone.   It was in the past 6 months that he stopped working and moved to [place]. Snoring : When he was  in the somewhat recent past his wife told him that he did stop breathing at night. Gained 10 # since moving to [place]. He feels part of this is his mother's cooking. He is less interested in doing things than he had been in the past.    Right carotid stenosis: A few months ago he had an episode where he could not move his right arm and his right arm was completely limp. He had some right leg symptoms that day as well. A carotid evaluation demonstrated a right carotid stenosis that was 75%. He was told that 75% is normal for his age. He started an aspirin a day. He continues to take the aspirin a day. Review of Systems   Constitutional: Positive for fatigue. Negative for fever. HENT: Negative for nosebleeds and trouble swallowing. Eyes: Negative for pain and visual disturbance. Respiratory: Positive for apnea. Negative for cough and shortness of breath. Cardiovascular: Negative for chest pain and leg swelling. Gastrointestinal: Negative for abdominal pain, constipation, diarrhea, nausea and vomiting. Endocrine: Positive for cold intolerance. Negative for heat intolerance and polyuria. Genitourinary: Negative for difficulty urinating and hematuria. Musculoskeletal: Positive for arthralgias and back pain. Negative for gait problem. Skin: Negative for rash and wound. Allergic/Immunologic: Positive for environmental allergies. Negative for food allergies and immunocompromised state. Neurological: Positive for speech difficulty (related to a seizure) and light-headedness (related to epilepsy). Negative for headaches. Hematological: Negative for adenopathy. Does not bruise/bleed easily. Psychiatric/Behavioral: Positive for dysphoric mood. Negative for self-injury and suicidal ideas. The patient is not nervous/anxious. No loss of BB control or buttocks numbness.    Loss of height of 1-2 inches. Fasting: No    No Known Allergies     PMHx: Seizures -he is cared for by an epileptologist at the [facility] clinic. His brain was mapped and the location that the seizures are coming from is too close to the speech center of the brain to be addressed with neurosurgery. His cholesterol has always been on the high side. OBJECTIVE    /78 (Site: Left Upper Arm, Position: Sitting, Cuff Size: Medium Adult)   Pulse 75   Temp 97.1 °F (36.2 °C) (Infrared)   Ht 5' 9\" (1.753 m)   Wt 188 lb 6.4 oz (85.5 kg)   SpO2 98%   BMI 27.82 kg/m²     Physical Exam   Constitutional:       General: Not in acute distress. Appearance: Normal appearance. Not ill-appearing, toxic-appearing or diaphoretic. HENT:      Head: Normocephalic. Right Ear: Tympanic membrane, ear canal and external ear normal.      Left Ear: Tympanic membrane, ear canal and external ear normal.      Nose: No rhinorrhea. Mouth/Throat:      Mouth: Mucous membranes are moist.      Pharynx: Oropharynx is clear. No oropharyngeal exudate or posterior oropharyngeal erythema. Class 4 airway. Neck circ appears normal.   Eyes:      General: No scleral icterus. Right eye: No discharge. Left eye: No discharge. Conjunctiva/sclera: Conjunctivae normal.   Neck:      Thyroid: No thyroid mass, thyromegaly or thyroid tenderness. Cardiovascular:      Rate and Rhythm: Normal rate and regular rhythm. Pulses:           Posterior tibial pulses are 2+ on the right side and 2+ on the left side. Heart sounds: Normal heart sounds. No murmur heard. No friction rub. No gallop. Pulmonary:      Effort: Pulmonary effort is normal. No respiratory distress. Breath sounds: Normal breath sounds. No stridor. No wheezing, rhonchi or rales. Abdominal:      General: There is no distension. Palpations: Abdomen is soft. Tenderness: There is no abdominal tenderness. There is no guarding.    Musculoskeletal: General: No deformity. Cervical back: No rigidity. Right lower leg: No edema. Left lower leg: No edema. 5/5 geetha adf and apf. He has pretty good strength with bilateral shoulder internal and external rotation. He has the most pain with right shoulder external rotation. He does have significant pain with right shoulder forced abduction at about 80 degrees. That pain comes down the right arm past the deltoid muscle. He had a negative right Yergason's test.  Lymphadenopathy:      Cervical: No cervical adenopathy. Right upper body: No supraclavicular or axillary adenopathy. Left upper body: No supraclavicular or axillary adenopathy. Lower Body: No right inguinal adenopathy. No left inguinal adenopathy. Skin:     General: Skin is warm. Coloration: Skin is not jaundiced. Neurological:      Mental Status: She is alert. Deep Tendon Reflexes:      Reflex Scores:       Patellar reflexes are 3+ on the right side and 3+ on the left side. Psychiatric:         Attention and Perception: Attention and perception normal.         Mood and Affect: Mood normal.         Speech: Speech normal.         Behavior: Behavior normal.         Thought Content: Thought content normal.      ASSESSMENT/PLAN:    1. Encounter for well adult exam without abnormal findings  2. Screening for colorectal cancer  -     POCT FECAL IMMUNOCHEMICAL TEST (FIT); Future  3. Malformation of cortical development of brain ([facility] [address]  4. S/P placement of VNS (vagus nerve stimulation) device  Assessment & Plan:     5. Screening for diabetes mellitus  -     Hemoglobin A1C  6. Screening for HIV (human immunodeficiency virus)  7. Need for hepatitis C screening test  -     Hepatitis C Antibody  8. Open wedge compression fracture of T11 vertebra with routine healing, subsequent encounter  Assessment & Plan:   I think he may simply be getting past the compression fracture slowly.   I encouraged some stretching and increased activity. I will refer to an orthopedic specialist for a recheck. I am also referring to physical therapy though the physical therapy referral was primarily for his right upper arm. Orders:  -     Martine Ruff MD, Orthopedic Surgery, [facility]  9. Chronic right shoulder pain  Assessment & Plan:   I am referring to physical therapy to help with recovery of his right upper arm. I explained that chronic tendon/muscle problems often improve with stretching. Orders:  -     Reji Samaniego [address]  10. HLD  Assessment & Plan:   I will check a lipid panel today and expect that a cholesterol medication would be of benefit. He wanted to see the results before actually getting a prescription for a cholesterol medication. Orders:  -     Lipid Panel  -     atorvastatin (LIPITOR) 40 MG tablet; Take 1 tablet by mouth daily, Disp-90 tablet, R-1Normal  11. Stenosis of right carotid artery  Assessment & Plan:  I will order lipid panel. I told him he likely should take a statin even if the lipids turn out fairly normal.  I explained that perhaps the previous doctor did not mean that 75% stenosis was normal but met that 75% was not quite meeting the criteria for surgical intervention. Orders:  [facility]  -     atorvastatin (LIPITOR) 40 MG tablet; Take 1 tablet by mouth daily, Disp-90 tablet, R-1Normal  12. Sleep apnea, unspecified type  -     [address]  His airway is class IV. He does have fatigue. He has an ex wife reported apnea so I think that sleep apnea testing should be done. 13. Fatigue, unspecified type  -     CBC Auto Differential  -     Comprehensive Metabolic Panel  -     TSH WITH REFLEX TO FT4  I will order blood tests looking for possible causes for fatigue. 14. Allergic rhinitis, unspecified seasonality, unspecified trigger  Assessment & Plan:   Not significantly problematic.   15. Symptomatic localization-related epilepsy [facility])  Assessment & Plan: Managed by  with [facility]. 16. Moderate episode of recurrent major depressive disorder [facility])  Assessment & Plan:   He is in the process of getting to a counselor and has several names that were given to him by his insurance. I told him that I had be comfortable prescribing a medication of 1 was felt necessary and his counselor does not prescribe medication. I encouraged counseling since counseling has more of a permanent benefit than medication. He does not feel he needs a medication at this time and he is not suicidal or having thoughts of self-harm. Counseling provided for:  I recommended gradual increase in activity. I recommended stretching to help the arm and back. Well visit information given for additional counseling. Return in about 4 weeks (around 11/18/2021) for carotid, rt shoulder, back. fatigue sleep apnea. 75 minutes were spent this calendar day in pre-charting and chart review; with the patient doing history, exam, medical decision making, and counseling; and completing orders and documentation. I did not keep track of how much of the time was spent on the problem visit portion and how much of the time was spent on the annual preventive visit portion but just wanted to document the total amount of time spent.     Riya Fox MD

## 2021-11-02 NOTE — PROGRESS NOTES
Usage       []  Cognitive Impairment      []  Neuromuscular Problems  []  Epilepsy/Neurological Disorders         Duration of Sleep Problems:    History:    Social History     Socioeconomic History    Marital status:      Spouse name: Not on file    Number of children: Not on file    Years of education: Not on file    Highest education level: Not on file   Occupational History    Not on file   Tobacco Use    Smoking status: Never Smoker    Smokeless tobacco: Never Used   Substance and Sexual Activity    Alcohol use: No    Drug use: No    Sexual activity: Not on file   Other Topics Concern    Not on file   Social History Narrative    Not on file     Social Determinants of Health     Financial Resource Strain: Low Risk     Difficulty of Paying Living Expenses: Not hard at all   Food Insecurity: No Food Insecurity    Worried About Running Out of Food in the Last Year: Never true    Carlos of Food in the Last Year: Never true   Transportation Needs: No Transportation Needs    Lack of Transportation (Medical): No    Lack of Transportation (Non-Medical): No   Physical Activity:     Days of Exercise per Week:     Minutes of Exercise per Session:    Stress:     Feeling of Stress :    Social Connections:     Frequency of Communication with Friends and Family:     Frequency of Social Gatherings with Friends and Family:     Attends Confucianist Services:     Active Member of Clubs or Organizations:     Attends Club or Organization Meetings:     Marital Status:    Intimate Partner Violence:     Fear of Current or Ex-Partner:     Emotionally Abused:     Physically Abused:     Sexually Abused:        Prior to Admission medications    Medication Sig Start Date End Date Taking? Authorizing Provider   clonazePAM (KLONOPIN) 0.5 MG disintegrating tablet Take 1 tablet by mouth 2 times daily as needed.  3/15/21   Historical Provider, MD   atorvastatin (LIPITOR) 40 MG tablet Take 1 tablet by mouth daily 10/21/21   Jojo Amaro MD   lamoTRIgine (LAMICTAL) 150 MG tablet Take 150 mg by mouth 2 times daily. Historical Provider, MD   topiramate (TOPAMAX) 200 MG tablet Take 200 mg by mouth 2 times daily. Takes 200 mg in am and 300 mg in pm.    Historical Provider, MD   Clobazam (ONFI) 10 MG TABS Take 10 mg by mouth 2 times daily. Historical Provider, MD       Allergies as of 11/02/2021    (No Known Allergies)       Patient Active Problem List   Diagnosis    History of craniotomy    Malformation of cortical development of brain ([facility]    Open wedge fracture of thoracic vertebra (HCC)    Moderate episode of recurrent major depressive disorder (HCC)    Rhinitis, allergic    S/P placement of VNS (vagus nerve stimulation) device    Stenosis of right carotid artery    Symptomatic localization-related epilepsy ([facility] [address]    HLD    Chronic right shoulder pain       Past Medical History:   Diagnosis Date    HLD 10/21/2021    Seizures ([facility] [address]        Past Surgical History:   Procedure Laterality Date    BRAIN SURGERY         History reviewed. No pertinent family history. Objective:     Vitals:    11/02/21 1020   Weight: 182 lb (82.6 kg)   Height: 5' 11\" (1.803 m)     Body mass index is 25.38 kg/m². Neck - Neck circumference: 16  Inches  [place] - Total score: 12    REVIEW OF SYSTEMS:  General: No distress. Constitutional: Negative for fever, no distress. HENT: Negative for sore throat, external appearance of ears and nose normal.   Eyes: Negative for redness. Respiratory: Negative for dyspnea, cough. Cardiovascular: Negative for chest pain. Gastrointestinal: Negative for vomiting, diarrhea. Musculoskeletal: Negative for arthralgias. Skin: Negative for rash. Neurological: Negative for syncope. .      Previous CPAP Usage:    [x]  Patient has never worn PAP. []  Patient has worn PAP previously but discontinued use. []  Current PAP user.        Assessment:      Diagnosis:  Probable jannie     Plan: 1.d/w pt  2.advised weight loss  3. father and sister both have jannie  Schedule sleep study  Further recommendations afterwards  rtc 2 months  Daphene Jeans is a 64 y.o. male being evaluated by a Virtual Visit (video visit) encounter to address concerns as mentioned above. A caregiver was present when appropriate. Due to this being a TeleHealth encounter ([facility] public health emergency), evaluation of the following organ systems was limited: Vitals/Constitutional/EENT/Resp/CV/GI//MS/Neuro/Skin/Heme-Lymph-Imm. Pursuant to the emergency declaration under the [address] [place] [address], [address] [place] [address] authority and the [employer] Act, this Virtual Visit was conducted with patient's (and/or legal guardian's) consent, to reduce the patient's risk of exposure to COVID-19 and provide necessary medical care. The patient (and/or legal guardian) has also been advised to contact this office for worsening conditions or problems, and seek emergency medical treatment and/or call 911 if deemed necessary. Patient identification was verified at the start of the visit: Yes    Services were provided through a video synchronous discussion virtually to substitute for in-person clinic visit. Patient and provider were located at their individual homes. --Qian Merrill MD on 11/2/2021 at 10:30 AM    An electronic signature was used to authenticate this note. No orders of the defined types were placed in this encounter.          Electronically signed by Qian Merrill MD on 11/2/2021 at 10:30 AM

## 2021-11-08 PROBLEM — R19.5 POSITIVE FIT (FECAL IMMUNOCHEMICAL TEST): Status: ACTIVE | Noted: 2021-01-01

## 2021-11-08 NOTE — FLOWSHEET NOTE
Outpatient Physical Therapy  [place]           [x] Phone: [phone]   Fax: [phone]  [place]           [] Phone: [phone]   Fax: [phone]        Physical Therapy Daily Treatment Note  Date:  2021    Patient Name:  Adal Escobar    :  1965  MRN: [id number]  Restrictions/Precautions:    Diagnosis:    Chronic R shoulder pain  Date of Injury/Surgery:   Treatment Diagnosis:  R shoulder weakness, decreased ROM of R shoulder, R shoulder pain    Insurance/Certification information: [facility]   Referring Physician:   Dr. Elizabeth Barahona  Next Doctor Visit:    Plan of care signed (Y/N):  POC sent 21  Outcome Measure: Quick DASH: 43.18%  Visit# / total visits:     Pain level: 0/10 at rest; 7/10 with overhead movement   Goals:        Short term goals  Time Frame for Short term goals: 6 visits  Short term goal 1: Pt will be independent with HEP to maximize rehab potential outside of clinic  Short term goal 2: Pt will improve R shoulder abd AROM to 110 degrees to aide in ability to perform ADLs  Long term goals  Time Frame for Long term goals : 12 visits  Long term goal 1: Pt will improve quick DASH score to a 33% or lower to show subjective patient improvement in function. Long term goal 2: Pt will improve global RUE strength to at least a 4+/5 to aide in ability to perform ADLs and overhead activities  Long term goal 3: Pt will improve R shoulder abd AROM to 150 degrees or more to aide in ability to perform overhead activities and reaching tasks. Summary of Evaluation: Assessment: Pt is a 65 yo male that presents to outpatient physical therapy with R shoulder pain, ROM deficits, and weakness. Positive Special Tests include Speeds test and Carrasco-Peewee. Tenderness to palpation was noted over the proximal biceps tendon and over the supraspinatus muscle belly. 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  Pt will benefit from skilled physical therapy to improve above listed impairments, aide in ADLs, and to progress toward goal completion. Subjective:  See eval         Any changes in Ambulatory Summary Sheet? None        Objective:  See eval   COVID screening questions were asked and patient attested that there had been no contact or symptoms      Exercises: (No more than 4 columns)   Exercise/Equipment 11/8/21 #1 Date Date           WARM UP         UBE            TABLE      Supine Cane assisted shld abd x10 5\" hold                                STANDING      Doorway Pec stretch 30\"     theraband Row x10 RTB     Supinated shoulder flex ecc emphasis                                   PROPRIOCEPTION                                    MODALITIES                      Other Therapeutic Activities/Education: educated pt on HEP and importance of completing exercises outside of the clinic. Home Exercise Program:   Doorway Pec stretch  TB row  Supine Cane assisted AAROM shld abd    Manual Treatments:  none      Modalities:  none      Communication with other providers:  POC sent on 11/8/21       Assessment:   Pt is a 65 yo male that presents to outpatient physical therapy with R shoulder pain, ROM deficits, and weakness. Positive Special Tests include Speeds test and Carrasco-Peewee. Tenderness to palpation was noted over the proximal biceps tendon and over the supraspinatus muscle belly. 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 Pt will benefit from skilled physical therapy to improve above listed impairments, aide in ADLs, and to progress toward goal completion.    End pain: 0/10      Plan for Next Session: R shld ROM, R shoulder strengthening, UBE, Biceps eccentrics,      Time In / Time Out:    [id number]    If [employer] Please Indicate Units for Rx this date and running total for each and overall total for Rx to date:  CPT Code Units today Running Total Units Total approved    TE [address]  1 1    MAN [id number]       Gait [id number]      NR [id number]

## 2021-11-08 NOTE — PROGRESS NOTES
Physical Therapy  Initial Assessment  Date: 2021  Patient Name: Nancy Beck  MRN: [id number]  : 1965     Treatment Diagnosis: R shoulder weakness, decreased ROM of R shoulder, R shoulder pain    Restrictions: none       Subjective   General  Chart Reviewed: Yes  Patient assessed for rehabilitation services?: Yes  Additional Pertinent Hx: Epilepsy, compression fx T11/12  Response To Previous Treatment: Not applicable  Family / Caregiver Present: No  Referring Practitioner: Dr. Iwona Tadeo  Referral Date : 10/21/21  Diagnosis: Chronic R shoulder pain  Follows Commands: Within Functional Limits  PT Visit Information  Onset Date:  (2021)  PT Insurance Information: [employer]  Subjective  Subjective: Pt reports he experienced a fall out of bed during a seizure back in 2021 that resulted in a compression fracture of T11/12. Pt thinks that he landed on his shoulder in a way to cause injury. Pt says he did not immediately have shoulder pain, but as back started healing, his R shoulder pain gradually started increasing and is now causing him significant pain and limiting his ability to lift his arm overhead. Pt denies any shoulder issues prior to the fall. Pt also notes difficulty carrying items with R arm due to pain. At rest, pt states he normally doesnt experience any shoulder pain, but when lifting arm overhead, pain can get up to a 7/10. He says this varies from day to day, some days being better than others. Pt says pain radiates down to elbow, but denies any N/T. Pt is L handed. Pain Screening  Patient Currently in Pain: Yes  Pain Assessment  Pain Level:  (0/10 at rest; 7/10 when lifting arm overhead)  Pain Type: Chronic pain  Pain Location: Shoulder  Pain Orientation: Right  Pain Radiating Towards: Elbow  Pain Descriptors: Radiating;  Sharp; Discomfort; Aching; Sore; Tender  Pain Frequency: Intermittent  Pain Onset: Gradual  Functional Pain Assessment: Prevents or interferes some active activities and ADLs (pt is left handed)  Vital Signs  Patient Currently in Pain: Yes    Vision/Hearing  Vision  Vision: Within Functional Limits  Hearing  Hearing: Within functional limits    Orientation  Orientation  Overall Orientation Status: Within Normal Limits    Social/Functional History  Social/Functional History  Lives With: Alone  ADL Assistance: Independent  Homemaking Assistance: Independent  Ambulation Assistance: Independent  Transfer Assistance: Independent  Active : Yes  Mode of Transportation: Car  Occupation: Unemployed  Leisure & Hobbies: Golfing, Biking    Objective     Observation/Palpation  Posture: Poor (Rounded shoulders)  Palpation: Tenderness to palpation over proximal biceps tendon and over supraspinatus muscle belly  Observation: pt has forward head rounded shoulder seated posture. Body Mechanics: scapular elevation noted at end of available ROM for shld abd    PROM RUE (degrees)  RUE PROM: Exceptions  R Shoulder Flex  0-180: 0-155  R Shoulder Ext  0-45: NT  R Shoulder ABduction 0-180: 0-118  R Shoulder Int Rotation  0-70: WNL  R Shoulder Ext Rotation  0-90: 0-84  AROM RUE (degrees)  RUE AROM : Exceptions  R Shoulder Flexion 0-180: 0-150  R Shoulder Extension 0-45: NT  R Shoulder ABduction 0-180: 0-90  R Shoulder Int Rotation  0-70: WNL  R Shoulder Ext Rotation 0-90: 0-79  PROM LUE (degrees)  LUE PROM: WFL  AROM LUE (degrees)  LUE AROM : WFL  Spine  Cervical: pt noted mild shoulder pain with R SB. WFL all C spine directions AROM.     Strength RUE  Strength RUE: Exception  R Shoulder Flexion: 4-/5 (with pain)  R Shoulder ABduction: 3+/5 (with pain)  R Shoulder Internal Rotation: 4+/5  R Shoulder External Rotation: 4/5 (with mild pain)  R Elbow Flexion: 4+/5 (with mild pain)  R Elbow Extension: 4+/5  Strength LUE  Strength LUE: WNL  Strength Other  Other: Empty can vs full can strength equal weakness/pain     Additional Measures  Flexibility: Pec Minor tightness noted bilaterally  Special Tests: Negative labral crank; Negative AC shear; Negative Cross body add (pt noted some pain in posterior region, but not anterior); Negative Lift off sign;  Negative yeargason's; Positive Addis Heimlich; Positive Speed's test;  Other: [address] joint mobility WNL  Sensation  Overall Sensation Status: WNL                                          Assessment   Conditions Requiring Skilled Therapeutic Intervention  Body structures, Functions, Activity limitations: Decreased functional mobility ; Decreased ADL status; Decreased strength; Decreased ROM; Decreased coordination; Increased pain; Decreased posture  Assessment: Pt is a 65 yo male that presents to outpatient physical therapy with R shoulder pain, ROM deficits, and weakness. Positive Special Tests include Speeds test and Carrasco-Peewee. Tenderness to palpation was noted over the proximal biceps tendon and over the supraspinatus muscle belly. 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 Pt will benefit from skilled physical therapy to improve above listed impairments, aide in ADLs, and to progress toward goal completion. Treatment Diagnosis: R shoulder weakness, decreased ROM of R shoulder, R shoulder pain  Prognosis: Good  Decision Making: Low Complexity  History: See above  Exam: see above  Clinical Presentation: see above  Barriers to Learning: none  REQUIRES PT FOLLOW UP: Yes  Treatment Initiated : yes    Patient agrees with established plan of care and assisted in the development of their short term and long term goals. Patient had no adverse reaction with initial treatment and there are no barriers to learning. Demonstrates no mental or cognitive disorder.        Plan   Plan  Times per week: 2  Times per day: Daily  Plan weeks: 6  Specific instructions for Next Treatment: R shld ROM, R shoulder strengthening, UBE, Biceps eccentrics,  Current Treatment Recommendations: Strengthening, ROM, Functional Mobility Training, ADL/Self-care Training, Manual Therapy - Soft Tissue Mobilization, IADL Training, Pain Management, Modalities, Patient/Caregiver Education & Training, Home Exercise Program, Neuromuscular Re-education    OutComes Score   quick DASH: 43.18%                           Goals  Short term goals  Time Frame for Short term goals: 6 visits  Short term goal 1: Pt will be independent with HEP to maximize rehab potential outside of clinic  Short term goal 2: Pt will improve R shoulder abd AROM to 110 degrees to aide in ability to perform ADLs  Long term goals  Time Frame for Long term goals : 12 visits  Long term goal 1: Pt will improve quick DASH score to a 33% or lower to show subjective patient improvement in function. Long term goal 2: Pt will improve global RUE strength to at least a 4+/5 to aide in ability to perform ADLs and overhead activities  Long term goal 3: Pt will improve R shoulder abd AROM to 150 degrees or more to aide in ability to perform overhead activities and reaching tasks.        Therapy Time 7735-2218       Marilee Macedo, SPT

## 2021-11-08 NOTE — PLAN OF CARE
Outpatient Physical Therapy           [place]           [x] Phone: [phone]   Fax: [phone]  [place]           [] Phone: [phone]   Fax: [phone]     To: Referring Practitioner: Dr. Miriam Park    From: PAUL Kent     Patient: Young Albright       : 1965  Diagnosis: Diagnosis: Chronic R shoulder pain   Treatment Diagnosis: Treatment Diagnosis: R shoulder weakness, decreased ROM of R shoulder, R shoulder pain   Date: 2021     Physical Therapy Certification/Re-Certification Form  Dear Dr. Chetan Blackwell,  The following patient has been evaluated for physical therapy services and for therapy to continue, insurance requires physician review of the treatment plan initially and every 90 days. Please review the attached evaluation and/or summary of the patient's plan of care, and verify that you agree therapy should continue by signing the attached document and sending it back to our office. Assessment:    Assessment: Pt is a 65 yo male that presents to outpatient physical therapy with R shoulder pain, ROM deficits, and weakness. Positive Special Tests include Speeds test and Carrasco-Peewee. Tenderness to palpation was noted over the proximal biceps tendon and over the supraspinatus muscle belly. 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 Pt will benefit from skilled physical therapy to improve above listed impairments, aide in ADLs, and to progress toward goal completion. Patient agrees with established plan of care and assisted in the development of their short term and long term goals. Patient had no adverse reaction with initial treatment and there are no barriers to learning. Demonstrates no mental or cognitive disorder.        Plan of Care/Treatment to date:  [x] Therapeutic Exercise  [x] Modalities:  [x] Therapeutic Activity     [x] Ultrasound  [x] Electrical Stimulation  [] Gait Training      [] Cervical Traction [] Lumbar Traction  [x] Neuromuscular Re-education    [x] Cold/hotpack [] Iontophoresis   [x] Instruction in HEP      [x] Vasopneumatic    [] Dry Needling  [x] Manual Therapy               [] Aquatic Therapy       Other:          Frequency/Duration:  # Days per week: [] 1 day # Weeks: [] 1 week [] 5 weeks     [x] 2 days   [] 2 weeks [x] 6 weeks     [] 3 days   [] 3 weeks [] 7 weeks     [] 4 days   [] 4 weeks [] 8 weeks         [] 9 weeks [] 10 weeks         [] 11 weeks [] 12 weeks    Rehab Potential/Progress: [] Excellent [x] Good [] Fair  [] Poor     Goals:       Short term goals  Time Frame for Short term goals: 6 visits  Short term goal 1: Pt will be independent with HEP to maximize rehab potential outside of clinic  Short term goal 2: Pt will improve R shoulder abd AROM to 110 degrees to aide in ability to perform ADLs  Long term goals  Time Frame for Long term goals : 12 visits  Long term goal 1: Pt will improve quick DASH score to a 33% or lower to show subjective patient improvement in function. Long term goal 2: Pt will improve global RUE strength to at least a 4+/5 to aide in ability to perform ADLs and overhead activities  Long term goal 3: Pt will improve R shoulder abd AROM to 150 degrees or more to aide in ability to perform overhead activities and reaching tasks. Electronically signed by:  PAUL Yadav, 11/8/2021, 12:38 PM  Justice Arriaga PT, DPT      If you have any questions or concerns, please don't hesitate to call.   Thank you for your referral.      Physician Signature:________________________________Date:_________ TIME: _____  By signing above, therapists plan is approved by physician

## 2021-11-10 NOTE — FLOWSHEET NOTE
Outpatient Physical Therapy  [place]           [x] Phone: [phone]   Fax: [phone]  [place]           [] Phone: [phone]   Fax: [phone]        Physical Therapy Daily Treatment Note  Date:  11/10/2021    Patient Name:  Meir Timmons    :  1965  MRN: [id number]  Restrictions/Precautions:    Diagnosis:    Chronic R shoulder pain  Date of Injury/Surgery:   Treatment Diagnosis:  R shoulder weakness, decreased ROM of R shoulder, R shoulder pain    Insurance/Certification information: [facility]   Referring Physician:   Dr. Kaylee Fontenot  Next Doctor Visit:    Plan of care signed (Y/N):  POC sent 21  Outcome Measure: Quick DASH: 43.18%  Visit# / total visits:     Pain level: 0/10 currently at rest;  with certain movements   Goals:        Short term goals  Time Frame for Short term goals: 6 visits  Short term goal 1: Pt will be independent with HEP to maximize rehab potential outside of clinic  Short term goal 2: Pt will improve R shoulder abd AROM to 110 degrees to aide in ability to perform ADLs  Long term goals  Time Frame for Long term goals : 12 visits  Long term goal 1: Pt will improve quick DASH score to a 33% or lower to show subjective patient improvement in function. Long term goal 2: Pt will improve global RUE strength to at least a 4+/5 to aide in ability to perform ADLs and overhead activities  Long term goal 3: Pt will improve R shoulder abd AROM to 150 degrees or more to aide in ability to perform overhead activities and reaching tasks. Summary of Evaluation: Assessment: Pt is a 63 yo male that presents to outpatient physical therapy with R shoulder pain, ROM deficits, and weakness. Positive Special Tests include Speeds test and Carrasco-Peewee. Tenderness to palpation was noted over the proximal biceps tendon and over the supraspinatus muscle belly. 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  Pt will benefit from skilled physical therapy to improve above listed impairments, aide in ADLs, and to progress toward goal completion. Subjective:  Pt reports of no pain at rest but does increase with overhead movements, pushing down and with lifting. Any changes in Ambulatory Summary Sheet? None        Objective:  See eval   COVID screening questions were asked and patient attested that there had been no contact or symptoms  Tender with palpation along bicep tendon and proximal bicep  Did have some increase discomfort with ER using RTB, IR tolerated well. Exercises: (No more than 4 columns)   Exercise/Equipment 11/8/21 #1 Date 11/10/21 #2 Date           WARM UP         UBE  2'fwd/2'bkwd          TABLE      Supine Cane assisted shld abd x10 5\" hold x10 5\" hold                               STANDING      Doorway Pec stretch 30\" 30 sec x 2    theraband Row x10 RTB RTB 10x2    Low row - supinated   RTB 10x1    Supinated shoulder flex ecc emphasis  10x    ER/IR with towel roll  RTB 10x ea, did have some increase pain with ER post shld/ prox tricep                           PROPRIOCEPTION                                    MODALITIES      vaso  10'              Other Therapeutic Activities/Education: educated pt on HEP and importance of completing exercises outside of the clinic. Home Exercise Program:   Doorway Pec stretch  TB row  Supine Cane assisted AAROM shld abd    Manual Treatments:  Cross fiber to bicep tendon/STM prox bicep    Modalities: Patient received vasocompression x 10 min on low pressure. Patient had negative skin reaction afterwards. Communication with other providers:  POC sent on 11/8/21       Assessment:   Pt demonstrated overall good tolerance to today's session without adverse reaction. Will continue to benefit from skilled physical therapy to improve above listed impairments, aide in ADLs, and to progress toward goal completion.   End pain 0/10 following vaso    Pt is a 63 yo male that presents to outpatient physical therapy with R shoulder pain, ROM deficits, and weakness. Positive Special Tests include Speeds test and Carrasco-Peewee. Tenderness to palpation was noted over the proximal biceps tendon and over the supraspinatus muscle belly. 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 Pt will benefit from skilled physical therapy to improve above listed impairments, aide in ADLs, and to progress toward goal completion.    End pain: 0/10      Plan for Next Session: R shld ROM, R shoulder strengthening, UBE, Biceps eccentrics,      Time In / Time Out:    3704-9750    If [employer] Please Indicate Units for Rx this date and running total for each and overall total for Rx to date:  CPT Code Units today Running Total Units Total approved    TE 16510  1 2    MAN 83569  1 1    Gait 55323      NR [id number]      TA  [address] [address]       [address] [address]      VASO 24893  1 1    ADL/Self care 04054      DNT 1-2 61814      DNT 3-4 99669      Other:     1           Total for episode of care   5             Timed Code/Total Treatment Minutes:  35/45, (1) TE, (1) MT, (1) vaso      Next Progress Note due:  10th visit      Plan of Care Interventions:  [x] Therapeutic Exercise  [x] Modalities:  [x] Therapeutic Activity     [] Ultrasound  [] Estim  [] Gait Training      [] Cervical Traction [] Lumbar Traction  [x] Neuromuscular Re-education    [x] Cold/hotpack [] Iontophoresis   [x] Instruction in HEP      [x] Vasopneumatic   [] Dry Needling    [x] Manual Therapy               [] Aquatic Therapy              Electronically signed by:  Lyn Monk PTA 11/10/2021, 9:49 AM

## 2021-11-16 NOTE — FLOWSHEET NOTE
Outpatient Physical Therapy  [place]           [x] Phone: [phone]   Fax: [phone]  [facility]           [] Phone: [phone]   Fax: [phone]        Physical Therapy Daily Treatment Note  Date:  2021    Patient Name:  Leobardo Ha    :  1965  MRN: [id number]  Restrictions/Precautions:    Diagnosis:    Chronic R shoulder pain  Date of Injury/Surgery:   Treatment Diagnosis:  R shoulder weakness, decreased ROM of R shoulder, R shoulder pain    Insurance/Certification information: [facility]   Referring Physician:   Dr. Erica Carmona  Next Doctor Visit:    Plan of care signed (Y/N):  POC sent 21  Outcome Measure: Quick DASH: 43.18%  Visit# / total visits:  3/12  Pain level: 0/10 currently at rest; 9/44 with certain movements - reaching overhead and away from body   Goals:        Short term goals  Time Frame for Short term goals: 6 visits  Short term goal 1: Pt will be independent with HEP to maximize rehab potential outside of clinic  Short term goal 2: Pt will improve R shoulder abd AROM to 110 degrees to aide in ability to perform ADLs  Long term goals  Time Frame for Long term goals : 12 visits  Long term goal 1: Pt will improve quick DASH score to a 33% or lower to show subjective patient improvement in function. Long term goal 2: Pt will improve global RUE strength to at least a 4+/5 to aide in ability to perform ADLs and overhead activities  Long term goal 3: Pt will improve R shoulder abd AROM to 150 degrees or more to aide in ability to perform overhead activities and reaching tasks. Summary of Evaluation: Assessment: Pt is a 65 yo male that presents to outpatient physical therapy with R shoulder pain, ROM deficits, and weakness. Positive Special Tests include Speeds test and Carrasco-Peewee. Tenderness to palpation was noted over the proximal biceps tendon and over the supraspinatus muscle belly. 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 Pt will benefit from skilled physical therapy to improve above listed impairments, aide in ADLs, and to progress toward goal completion. Subjective:  Pt states he did well after previous session. Pain still increasing to 0/31 with certain movements but feels he is painfree more of the day. Any changes in Ambulatory Summary Sheet? None        Objective:  See eval   COVID screening questions were asked and patient attested that there had been no contact or symptoms  Tender with palpation along bicep tendon and proximal bicep  Did well with ER using YTB  Continues to have increase pain with reaching overhead and away from body. Exercises: (No more than 4 columns)   Exercise/Equipment 11/8/21 #1 Date 11/10/21 #2 Date 11/16/21 #3           WARM UP         UBE  2'fwd/2'bkwd 2'fwd/2'bkwd         TABLE      Supine Cane assisted shld abd x10 5\" hold x10 5\" hold x10 5\" hold   Supine cane flex   x10 5\" hold   Rhythmic stab   At 90 deg 20 sec x 3                  STANDING      Doorway Pec stretch 30\" 30 sec x 2 30 sec x 2   theraband Row x10 RTB RTB 10x2 GTB 10x2   Low row - supinated   RTB 10x1 RTB 10x2   Supinated shoulder flex ecc emphasis  10x 2x10   ER/IR with towel roll  RTB 10x ea, did have some increase pain with ER post shld/ prox tricep IR RTB 2x10, ER YTB 2x10   Bicep curl    2# eccentric focus 15x                    PROPRIOCEPTION                                    MODALITIES      vaso  10' 10'             Other Therapeutic Activities/Education: educated pt on HEP and importance of completing exercises outside of the clinic. Home Exercise Program:   Doorway Pec stretch  TB row  Supine Cane assisted AAROM shld abd  IR/ER with TB  Manual Treatments:  Cross fiber to bicep tendon/STM prox bicep, PROM ea dir, gentle post and inf mob    Modalities: Patient received vasocompression x 10 min on low pressure. Patient had negative skin reaction afterwards.        Communication with other providers:  POC sent on 11/8/21       Assessment:   Pt demonstrated overall good tolerance to today's session without adverse reaction. Will continue to benefit from skilled physical therapy to improve above listed impairments, aide in ADLs, and to progress toward goal completion. End pain 0/10 following vaso    Pt is a 65 yo male that presents to outpatient physical therapy with R shoulder pain, ROM deficits, and weakness. Positive Special Tests include Speeds test and Carrasco-Peewee. Tenderness to palpation was noted over the proximal biceps tendon and over the supraspinatus muscle belly. 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 Pt will benefit from skilled physical therapy to improve above listed impairments, aide in ADLs, and to progress toward goal completion.    End pain: 0/10      Plan for Next Session: R shld ROM, R shoulder strengthening, UBE, Biceps eccentrics,      Time In / Time Out:    1203-4836    If [employer] Please Indicate Units for Rx this date and running total for each and overall total for Rx to date:  CPT Code Units today Running Total Units Total approved    TE 14261  2 4    MAN 10520  1 2    Gait 35396      NR 54971      TA  515[address]      Estim [place]att [address]       [address] [facility] [phone]      VASO 28326  1 2    ADL/Self care 18813      DNT 1-2 23448      DNT 3-4 24704      Other:     1           Total for episode of care   9             Timed Code/Total Treatment Minutes:  42/52, (2) TE, (1) MT, (1) vaso      Next Progress Note due:  10th visit      Plan of Care Interventions:  [x] Therapeutic Exercise  [x] Modalities:  [x] Therapeutic Activity     [] Ultrasound  [] Estim  [] Gait Training      [] Cervical Traction [] Lumbar Traction  [x] Neuromuscular Re-education    [x] Cold/hotpack [] Iontophoresis   [x] Instruction in HEP      [x] Vasopneumatic   [] Dry Needling    [x] Manual Therapy               [] Aquatic Therapy              Electronically signed by:  Rosalia Miramontes PTA 11/16/2021, 9:48 AM

## 2021-11-18 NOTE — PROGRESS NOTES
11/18/21    Shannen Dai  1965    SUBJECTIVE    HPI - Lum Nurse is a 64 y.o. male who presents today for evaluation of:  Chief Complaint   Patient presents with    1 Month Follow-Up     Seizure ; he is getting PT for RT shoulder pain since he had a seizure. He was having seizures when he was working and has had none since stopping work. Depression: He gets a [employer] assessment twice a week. He is currently happy with getting counseling and prefers not to take a medication. Recently an email assessment has been saying he is having a bad week. No si or hi. He is irritable. Anxiety is pretty stable. Things that bothered him in the past now bother him. An example might be his mother singing Muslim hymns in the home. Even when the weather is nice outside he finds himself sitting in a chair and falling asleep. His mom encourages him to do things that don't even sound good. He has had a lot of change in his life, change of job and marriage. Not working contributes to him feeling down. In the past he has been able to fight through seizures but doctors and family have felt he should stop. He is having trouble accepting that he cannot work. He has applied for disability. He is considering volunteer work. He feels lack of purpose and meaning. He does devotions sometimes. In the past he used to love doing it. Review of Systems   Respiratory: Negative for cough and shortness of breath. Cardiovascular: Negative for chest pain, palpitations and leg swelling. Gastrointestinal: Negative for abdominal pain, diarrhea and vomiting. Musculoskeletal: Positive for back pain. Psychiatric/Behavioral: Positive for dysphoric mood and sleep disturbance. Negative for suicidal ideas.        No Known Allergies     OBJECTIVE    /70 (Site: Left Upper Arm, Position: Sitting, Cuff Size: Medium Adult)   Pulse 90   Temp 97.2 °F (36.2 °C) (Infrared)   Wt 183 lb 12.8 oz (83.4 kg)   SpO2 99%   BMI 25.63 kg/m² Physical Exam   Constitutional:       General: Not in acute distress. Appearance: Normal appearance. Not ill-appearing. Eyes:      General: No scleral icterus. Neck:      Thyroid: No thyroid mass, thyromegaly or thyroid tenderness. Lymphadenopathy:      Cervical:      Right cervical: No superficial cervical adenopathy. Left cervical: No superficial cervical adenopathy. Cardiovascular:      Rate and Rhythm: Normal rate and regular rhythm. Heart sounds: No murmur heard. No friction rub. No gallop. Pulmonary:      Effort: Pulmonary effort is normal. No respiratory distress. Breath sounds: No wheezing, rhonchi or rales. Abdominal:      Palpations: Abdomen is soft. There is no mass. Tenderness: There is no abdominal tenderness. Musculoskeletal:     Moves all extremities normally. Skin:     General: Skin is warm. Coloration: Skin is not jaundiced. Neurological:      Mental Status: She is alert. Psychiatric:         Behavior: Behavior normal.         Thought Content: Thought content normal.         Judgment: Judgment normal.      ASSESSMENT/PLAN:    1. Moderate episode of recurrent major depressive disorder [facility])  Assessment & Plan:   I discussed his depression with him. I greatly support that he is working with a counselor. He reports a great deal of difficulty being motivated to do things. After talking with him about it for a while I decided that would be worth it to take a low-dose of sertraline to see if that helps. Orders:  -     sertraline (ZOLOFT) 25 MG tablet; Take 1 tablet by mouth daily, Disp-30 tablet, R-1Normal  2. Stenosis of right carotid artery  Assessment & Plan:   Given the carotid artery stenosis I think that atorvastatin is a good idea. 3. HLD  Assessment & Plan:   Continue atorvastatin at the higher dose of 80 mg daily. Orders:  -     atorvastatin (LIPITOR) 80 MG tablet;  Take 1 tablet by mouth daily, Disp-90 tablet, R-2Update dose to

## 2021-11-18 NOTE — ASSESSMENT & PLAN NOTE
I discussed his depression with him. I greatly support that he is working with a counselor. He reports a great deal of difficulty being motivated to do things. After talking with him about it for a while I decided that would be worth it to take a low-dose of sertraline to see if that helps.

## 2021-11-18 NOTE — PROGRESS NOTES
Sierra Ricketts 64 y.o. male was seen by NYLA Osuna on 11/18/21     Wt Readings from Last 3 Encounters:   11/18/21 185 lb 9.6 oz (84.2 kg)   11/18/21 183 lb 12.8 oz (83.4 kg)   11/02/21 182 lb (82.6 kg)       DRU Ricketts is a pleasant 64 y.o.  male who presents today for postive fecal immunochemical (FIT) test.  He has a past medical history of craniotomy, malformation of cortical development of brain, depression, HLD, rhinitis, stenosis of right carotic artery, chronic right shoulder pain, and seizures. His FIT test was noted to be positive on 11-8-2021. His last colonoscopy was done age 48 with per patient record normal colon. He mentioned two weeks ago having diarrhea for seven days with blood in it. His typical bowel pattern is every other with soft brown formed stools. No diarrhea or constipation. No further rectal bleeding or black tarry stools. No excess belching or flatulence. His appetite is good without early satiety. His weight is stable. No nausea or vomiting. No abdominal pain, bloating or distention. Intermittent heartburn. No nocturnal awakenings with acid reflux. No dysphagia or pain with swallowing. No family history of stomach or colon cancer. ROS  Review of Systems   Constitutional: Positive for fatigue. Negative for appetite change, chills, diaphoresis, fever and unexpected weight change. HENT: Negative for ear pain, hearing loss and tinnitus. Eyes: Negative for pain, discharge and visual disturbance. Respiratory: Negative for cough, shortness of breath and wheezing. Cardiovascular: Negative for chest pain, palpitations and leg swelling. Gastrointestinal: Positive for blood in stool. Negative for abdominal pain, constipation, diarrhea, nausea and vomiting. Endocrine: Negative for cold intolerance and heat intolerance. Genitourinary: Negative for dysuria, frequency, hematuria and urgency.    Musculoskeletal: Positive for back atraumatic. Nose: Nose normal.      Mouth/Throat:      Mouth: Mucous membranes are moist.   Cardiovascular:      Rate and Rhythm: Normal rate and regular rhythm. Pulses: Normal pulses. Heart sounds: Normal heart sounds. No murmur heard. No gallop. Pulmonary:      Effort: Pulmonary effort is normal. No respiratory distress. Breath sounds: Normal breath sounds. No stridor. No wheezing or rhonchi. Abdominal:      General: Bowel sounds are normal. There is no distension. Palpations: Abdomen is soft. There is no mass. Tenderness: There is no abdominal tenderness. Hernia: No hernia is present. Musculoskeletal:         General: Normal range of motion. Cervical back: Neck supple. Skin:     General: Skin is warm and dry. Neurological:      Mental Status: He is alert and oriented to person, place, and time.    Psychiatric:         Mood and Affect: Mood normal.         Orders Only on 11/08/2021   Component Date Value Ref Range Status    OCCULT BLOOD FECAL 11/08/2021 positive   Final   Office Visit on 10/21/2021   Component Date Value Ref Range Status    Hemoglobin A1C 10/21/2021 5.3  See comment % Final    Comment: Comment:  Diagnosis of Diabetes: > or = 6.5%  Increased risk of diabetes (Prediabetes): 5.7-6.4%  Glycemic Control: Nonpregnant Adults: <7.0%                    Pregnant: <6.0%        eAG 10/21/2021 105.4  mg/dL Final    Cholesterol, Total 10/21/2021 264* 0 - 199 mg/dL Final    Triglycerides 10/21/2021 126  0 - 150 mg/dL Final    HDL 10/21/2021 51  40 - 60 mg/dL Final    LDL Calculated 10/21/2021 188* <100 mg/dL Final    VLDL Cholesterol Calculated 10/21/2021 25  Not Established mg/dL Final    WBC 10/21/2021 5.0  4.0 - 11.0 K/uL Final    RBC 10/21/2021 4.38  4.20 - 5.90 M/uL Final    Hemoglobin 10/21/2021 14.4  13.5 - 17.5 g/dL Final    Hematocrit 10/21/2021 42.9  40.5 - 52.5 % Final    MCV 10/21/2021 97.8  80.0 - 100.0 fL Final    MCH 10/21/2021 32.9 26.0 - 34.0 pg Final    MCHC 10/21/2021 33.6  31.0 - 36.0 g/dL Final    RDW 10/21/2021 13.1  12.4 - 15.4 % Final    Platelets 33/55/6403 195  135 - 450 K/uL Final    MPV 10/21/2021 7.8  5.0 - 10.5 fL Final    Neutrophils % 10/21/2021 55.5  % Final    Lymphocytes % 10/21/2021 29.1  % Final    Monocytes % 10/21/2021 7.9  % Final    Eosinophils % 10/21/2021 6.6  % Final    Basophils % 10/21/2021 0.9  % Final    Neutrophils Absolute 10/21/2021 2.8  1.7 - 7.7 K/uL Final    Lymphocytes Absolute 10/21/2021 1.5  1.0 - 5.1 K/uL Final    Monocytes Absolute 10/21/2021 0.4  0.0 - 1.3 K/uL Final    Eosinophils Absolute 10/21/2021 0.3  0.0 - 0.6 K/uL Final    Basophils Absolute 10/21/2021 0.0  0.0 - 0.2 K/uL Final    Sodium 10/21/2021 142  136 - 145 mmol/L Final    Potassium 10/21/2021 4.4  3.5 - 5.1 mmol/L Final    Chloride 10/21/2021 110  99 - 110 mmol/L Final    CO2 10/21/2021 21  21 - 32 mmol/L Final    Anion Gap 10/21/2021 11  3 - 16 Final    Glucose 10/21/2021 89  70 - 99 mg/dL Final    BUN 10/21/2021 16  7 - 20 mg/dL Final    CREATININE 10/21/2021 1.3  0.9 - 1.3 mg/dL Final    GFR Non- 10/21/2021 57* >60 Final    Comment: >60 mL/min/1.73m2 EGFR, calc. for ages 25 and older using the  MDRD formula (not corrected for weight), is valid for stable  renal function.  GFR  10/21/2021 >60  >60 Final    Comment: Chronic Kidney Disease: less than 60 ml/min/1.73 sq.m. Kidney Failure: less than 15 ml/min/1.73 sq.m. Results valid for patients 18 years and older.       Calcium 10/21/2021 9.5  8.3 - 10.6 mg/dL Final    Total Protein 10/21/2021 7.2  6.4 - 8.2 g/dL Final    Albumin 10/21/2021 4.8  3.4 - 5.0 g/dL Final    Albumin/Globulin Ratio 10/21/2021 2.0  1.1 - 2.2 Final    Total Bilirubin 10/21/2021 0.6  0.0 - 1.0 mg/dL Final    Alkaline Phosphatase 10/21/2021 95  40 - 129 U/L Final    ALT 10/21/2021 13  10 - 40 U/L Final    AST 10/21/2021 26  15 - 37 U/L Final    Globulin 10/21/2021 2.4  Not Established g/dL Final    TSH Reflex FT4 10/21/2021 4.44* 0.27 - 4.20 uIU/mL Final    Hep C Ab Interp 10/21/2021 Non-reactive  Non-reactive Final    T4 Free 10/21/2021 1.0  0.9 - 1.8 ng/dL Final       Assessment and Plan:  1. Will plan for a colonoscopy with MAC anesthesia. The patient was informed of the risks and benefits of the procedure. 2.  Positive FIT test might be due to colon polyps or internal hemorrhoids in the rectum. Will order colonoscopy for colorectal cancer surveillance. 3.  Blood in stool most likely due to hemorrhoids. He denies further blood noted in his stools from two weeks ago. Recommend good bowel regimen with increase in fruit, fiber and fluids. Avoid straining with bowel movements and avoid sitting on the toilet for long periods of time. 4.  Further recommendations for follow-up will be determined after the colonoscopy has been completed.

## 2021-11-18 NOTE — PATIENT INSTRUCTIONS
Patient Education        Learning About Changing a Habit by Setting Goals  How can you change a habit? If you've decided to change a habitwhether it's quitting smoking, lowering your blood pressure, becoming more active, or doing something else to improve your healthcongratulations! Making that decision is the first step toward making a change. What happens next? Have a reason. Set goals you can reach. Prepare for slip-ups. And get support. What's your reason? Your reason for wanting to change a habit is really important. Maybe you want to quit smoking so that you can avoid future health problems. Or maybe you want to eat a healthier diet so you can lose weight. If you have high blood pressure, your reason may be clear: to lower your blood pressure. Maybe you smoke and want to save money on cigarettes. You need to feel ready to make a change. If you don't feel ready now, that's okay. You can still be thinking and planning. When you truly want to make changes, you're ready for the next step. It's not easy to change habitsbut you can do it. Taking the time to really think about what will motivate or inspire you will help you reach your goals. How do you set goals? Setting goals can help a lot when you're trying to make a healthy change. · Focus on small goals. This will help you reach larger goals over time. With smaller goals, you'll have success more often, which will help you stay with it. For example, your large goal may be to lose 20 pounds. Your small goal could be to lose 5.  · Write down your goals. This will help you remember, and you'll have a clearer idea of what you want to achieve. Use a journal or notebook to record your goals. Hang up your plan where you will see it often as a reminder of what you're trying to do. · Make your goals specific. Specific goals help you measure your progress.  For example, setting a goal to eat one extra serving of vegetables a day is better than a general goal to \"eat more vegetables. \"  · Focus on one goal at a time. By doing this, you're less likely to feel overwhelmed and then give up. · When you reach a goal, reward yourself. Celebrate your new behavior and success for several days, and then think about setting your next goal.  How can you prepare for slip-ups? It's perfectly normal to try to change a habit, go along fine for a while, and then have a setback. Lots of people try and try again before they reach their goals. What are the things that might cause a setback for you? If you have tried to change a habit before, think about what helped you and what got in your way. By thinking about these barriers now, you can plan ahead for how to deal with them if they happen. There will be times when you slip up and don't make your goal for the week. When that happens, don't get mad at yourself. Learn from the experience. Ask yourself what got in the way of reaching your goal. Positive thinking goes a long way when you're making lifestyle changes. How can you get support? · Get a partner. It's motivating to know that someone is trying to make the same change that you're making, like being more active or changing your eating habits. You have someone who is counting on you to help them succeed. That person can also remind you how far you've come. · Get friends and family involved. They can exercise with you. Or they can encourage you by saying how they admire what you are doing. Family members can join you in your healthy eating efforts. Don't be afraid to tell family and friends that their encouragement makes a big difference to you. · Join a class or support group. People in these groups often have some of the same barriers you have. They can give you support when you don't feel like staying with your plan. They can boost your morale when you need a lift. Suellen Cain also find a number of online support groups. · Encourage yourself.  When you feel like giving up, don't waste energy feeling bad about yourself. Remember your reason for wanting to change, think about the progress you've made, and give yourself a pep talk and a pat on the back. · Get professional help. A dietitian can help you make your diet healthier while still allowing you to eat foods that you enjoy. A  or physical therapist can help design an exercise program that is fun and easy to stay on. A counselor, a , or your doctor can help you overcome hurdles, reduce stress, or quit smoking. Where can you learn more? Go to [email or web address] and sign in to your [employer] account. Enter D879 in the [employer] box to learn more about \"Learning About Changing a Habit by Setting Goals. \"     If you do not have an account, please click on the \"Sign Up Now\" link. Current as of: June 16, 2021               Content Version: 13.0  © 4046-0736 [employer] Care instructions adapted under license by [facility] [address] . If you have questions about a medical condition or this instruction, always ask your healthcare professional. [place] [address] any warranty or liability for your use of this information. Patient Education        Learning About Changing a Habit by Setting Goals  How can you change a habit? If you've decided to change a habitwhether it's quitting smoking, lowering your blood pressure, becoming more active, or doing something else to improve your healthcongratulations! Making that decision is the first step toward making a change. What happens next? Have a reason. Set goals you can reach. Prepare for slip-ups. And get support. What's your reason? Your reason for wanting to change a habit is really important. Maybe you want to quit smoking so that you can avoid future health problems. Or maybe you want to eat a healthier diet so you can lose weight.  If you have high blood pressure, your reason may be clear: to lower your blood pressure. Maybe you smoke and want to save money on cigarettes. You need to feel ready to make a change. If you don't feel ready now, that's okay. You can still be thinking and planning. When you truly want to make changes, you're ready for the next step. It's not easy to change habitsbut you can do it. Taking the time to really think about what will motivate or inspire you will help you reach your goals. How do you set goals? Setting goals can help a lot when you're trying to make a healthy change. · Focus on small goals. This will help you reach larger goals over time. With smaller goals, you'll have success more often, which will help you stay with it. For example, your large goal may be to lose 20 pounds. Your small goal could be to lose 5.  · Write down your goals. This will help you remember, and you'll have a clearer idea of what you want to achieve. Use a journal or notebook to record your goals. Hang up your plan where you will see it often as a reminder of what you're trying to do. · Make your goals specific. Specific goals help you measure your progress. For example, setting a goal to eat one extra serving of vegetables a day is better than a general goal to \"eat more vegetables. \"  · Focus on one goal at a time. By doing this, you're less likely to feel overwhelmed and then give up. · When you reach a goal, reward yourself. Celebrate your new behavior and success for several days, and then think about setting your next goal.  How can you prepare for slip-ups? It's perfectly normal to try to change a habit, go along fine for a while, and then have a setback. Lots of people try and try again before they reach their goals. What are the things that might cause a setback for you? If you have tried to change a habit before, think about what helped you and what got in your way. By thinking about these barriers now, you can plan ahead for how to deal with them if they happen.   There Goals. \"     If you do not have an account, please click on the \"Sign Up Now\" link. Current as of: June 16, 2021               Content Version: 13.0  © 2436-1956 [employer]. Care instructions adapted under license by [facility] [place][note 2] [address] If you have questions about a medical condition or this instruction, always ask your healthcare professional. [address] any warranty or liability for your use of this information.

## 2021-11-18 NOTE — PATIENT INSTRUCTIONS
Patient Education        Colonoscopy: Before Your Procedure  What is a colonoscopy? A colonoscopy is a test that lets a doctor look inside your colon. The doctor uses a thin, lighted tube called a colonoscope to look for problems. These include small growths called polyps, cancer, or bleeding. During the test, the doctor can take samples of tissue that can be checked for cancer or other problems. This is called a biopsy. The doctor can also take out polyps. Before the test, you will need to stop eating solid foods. You also will be given instructions on how to clean out your colon. This helps your doctor be able to see inside your colon during the test.  How do you prepare for the procedure? Procedures can be stressful. This information will help you understand what you can expect. And it will help you safely prepare for your procedure. Preparing for the procedure    · Be sure you have someone to take you home. Anesthesia and pain medicine will make it unsafe for you to drive or get home on your own. · Understand exactly what procedure is planned, along with the risks, benefits, and other options.     · Tell your doctor ALL the medicines, vitamins, supplements, and herbal remedies you take. Some may increase the risk of problems during your procedure. Your doctor will tell you if you should stop taking any of them before the procedure and how soon to do it.     · If you take aspirin or some other blood thinner, ask your doctor if you should stop taking it before your procedure. Make sure that you understand exactly what your doctor wants you to do. These medicines increase the risk of bleeding.     · Make sure your doctor and the hospital have a copy of your advance directive. If you don't have one, you may want to prepare one. It lets others know your health care wishes. It's a good thing to have before any type of surgery or procedure.    Before the procedure    · Follow your doctor's directions about when to stop eating solid foods and drink only clear liquids. You can drink water, clear juices, clear broths, flavored ice pops, and gelatin (such as Jell-O). Do not eat or drink anything red or purple. This includes grape juice and grape-flavored ice pops. It also includes fruit punch and cherry gelatin.     · Drink the \"colon prep\" liquid as your doctor tells you. You will want to stay home, because the liquid will make you go to the bathroom a lot. Your stools will be loose and watery. It's very important to drink all of the liquid. If you have problems drinking it, call your doctor.     · Do not eat any solid foods after you drink the colon prep.     · Stop drinking clear liquids for a few hours before the test. Your doctor will tell you how many hours this will be. What happens on the day of the procedure? · Follow the instructions exactly about when to stop eating and drinking. If you don't, your procedure may be canceled. If your doctor told you to take your medicines on the day of the procedure, take them with only a sip of water.     · Take a bath or shower before you come in for your procedure. Do not apply lotions, perfumes, deodorants, or nail polish.     · Take off all jewelry and piercings. And take out contact lenses, if you wear them. At the doctor's office or hospital   · Bring a picture ID.     · You will be kept comfortable and safe by your anesthesia provider. The anesthesia may make you sleep.     · You will lie on your back or your side with your knees drawn up toward your belly. The doctor will gently put a gloved finger into your anus. Then the doctor puts the scope in and moves it into your colon. The scope goes in easily because it is lubricated.     · The doctor may also use small tools to take tissue samples for a biopsy or to remove polyps. This does not hurt.     · The test usually takes 30 to 45 minutes. But it may take longer. It depends on what is found and what is done. When should you call your doctor? · You have questions or concerns.     · You don't understand how to prepare for your procedure.     · You are having trouble with the bowel prep.     · You become ill before the procedure (such as fever, flu, or a cold).     · You need to reschedule or have changed your mind about having the procedure. Where can you learn more? Go to [email or web address] and sign in to your [employer] account. Enter C315 in the [employer] box to learn more about \"Colonoscopy: Before Your Procedure. \"     If you do not have an account, please click on the \"Sign Up Now\" link. Current as of: December 17, 2020               Content Version: 13.0  © 2006-2021 Healthwise, Incorporated. Care instructions adapted under license by [facility] ([facility]). If you have questions about a medical condition or this instruction, always ask your healthcare professional. [place] [address] any warranty or liability for your use of this information.

## 2021-11-19 NOTE — FLOWSHEET NOTE
Outpatient Physical Therapy  [facility]           [x] Phone: [phone]   Fax: [phone]  [facility]           [] Phone: [phone]   Fax: [phone]        Physical Therapy Daily Treatment Note  Date:  2021    Patient Name:  Meghan Farrell    :  1965  MRN: [id number]  Restrictions/Precautions:    Diagnosis:    Chronic R shoulder pain  Date of Injury/Surgery:   Treatment Diagnosis:  R shoulder weakness, decreased ROM of R shoulder, R shoulder pain    Insurance/Certification information: [facility]   Referring Physician:   Dr. Romy Brown  Next Doctor Visit:    Plan of care signed (Y/N):  POC sent 21  Outcome Measure: Quick DASH: 43.18%  Visit# / total visits:    Pain level: 0/10 currently at rest;  Currently has some soreness  Goals:        Short term goals  Time Frame for Short term goals: 6 visits  Short term goal 1: Pt will be independent with HEP to maximize rehab potential outside of clinic  Short term goal 2: Pt will improve R shoulder abd AROM to 110 degrees to aide in ability to perform ADLs  Long term goals  Time Frame for Long term goals : 12 visits  Long term goal 1: Pt will improve quick DASH score to a 33% or lower to show subjective patient improvement in function. Long term goal 2: Pt will improve global RUE strength to at least a 4+/5 to aide in ability to perform ADLs and overhead activities  Long term goal 3: Pt will improve R shoulder abd AROM to 150 degrees or more to aide in ability to perform overhead activities and reaching tasks. Summary of Evaluation: Assessment: Pt is a 63 yo male that presents to outpatient physical therapy with R shoulder pain, ROM deficits, and weakness. Positive Special Tests include Speeds test and Carrasco-Peewee. Tenderness to palpation was noted over the proximal biceps tendon and over the supraspinatus muscle belly. 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  Pt will benefit from skilled physical therapy to improve above listed impairments, aide in ADLs, and to progress toward goal completion. Subjective:  Pt arrives with no complaints of current pain. Two days ago, he was doing increased activity and was attempting to help fix a lawnmower, resulting in some muscle soreness the following day. Soreness has continued, but pt says its not painful, just muscular soreness. Any changes in Ambulatory Summary Sheet?   None        Objective:  See eval   COVID screening questions were asked and patient attested that there had been no contact or symptoms    Tender with palpation along bicep tendon  Pain noted at end ranges with cane flex-- cued to stop just before end range to avoid pain  Cueing with SA Protraction exercise to keep motion at scap rather than at elbow;  Notable difficulty with Supinated shoulder flex ecc emphasis-- resistance decreased to make more tolerable-    Exercises: (No more than 4 columns)   Exercise/Equipment Date 11/10/21 #2 Date 11/16/21 #3 11/19/21 #4           WARM UP         UBE 2'fwd/2'bkwd 2'fwd/2'bkwd          TABLE      Supine Cane assisted shld abd x10 5\" hold x10 5\" hold x10 5\"hold   Supine cane flex  x10 5\" hold x10 5\" hold   Supine cane ER   x10   Rhythmic stab  At 90 deg 20 sec x 3                   STANDING      Doorway Pec stretch 30 sec x 2 30 sec x 2    theraband Row RTB 10x2 GTB 10x2 GTB 2x10   TB shoulder ext   RTB x10   Low row - supinated  RTB 10x1 RTB 10x2 RTB x10   Supinated shoulder flex ecc emphasis 10x 2x10 YTB x10   ER/IR with towel roll RTB 10x ea, did have some increase pain with ER post shld/ prox tricep IR RTB 2x10, ER YTB 2x10    Bicep curl   2# eccentric focus 15x 3# x15   A-Z ball on wall   Pink ball A-Z   SA CKC on elevated table   x10   PROPRIOCEPTION                                    MODALITIES      vaso 10' 10' 10'             Other Therapeutic Activities/Education: educated pt on HEP and importance of completing exercises outside of the clinic. Home Exercise Program:   Doorway Pec stretch  TB row  Supine Cane assisted AAROM shld abd  IR/ER with TB  Manual Treatments:  Cross fiber to bicep tendon/STM prox bicep, PROM flex/abd, post & inf mobs    Modalities: Patient received vasocompression x 10 min on low pressure. Patient had negative skin reaction afterwards. Communication with other providers:  POC sent on 11/8/21       Assessment:  Pt was able to tolerate today's therapy session today with no adverse reactions. Pt needed to have cueing to keep arms straight with supinated shoulder flexion and had difficulty, so resistance was lowered to YTB so patient could perform correctly. Pt will continue to benefit from skilled physical therapy to improve above listed impairments, aide in ADLs, and to progress toward goal completion.   End pain 0/10       Plan for Next Session: R shld ROM, R shoulder strengthening, UBE, Biceps eccentrics,      Time In / Time Out:    1716-5925    If [employer] Please Indicate Units for Rx this date and running total for each and overall total for Rx to date:  CPT Code Units today Running Total Units Total approved    TE 50232  2 6    MAN 42665  1 3    Gait 24538      NR 55022      TA  53036      Estim Unatt [address]       [address] [facility] [address]      VASO 76307  1 3    ADL/Self care 80464      DNT 1-2 73205      DNT 3-4 20561      Other:     1           Total for episode of care   13             Timed Code/Total Treatment Minutes:  67/67, 12' manual x1; 32' TE x2; 10' vaso x1      Next Progress Note due:  10th visit      Plan of Care Interventions:  [x] Therapeutic Exercise  [x] Modalities:  [x] Therapeutic Activity     [] Ultrasound  [] Estim  [] Gait Training      [] Cervical Traction [] Lumbar Traction  [x] Neuromuscular Re-education    [x] Cold/hotpack [] Iontophoresis   [x] Instruction in HEP      [x] Vasopneumatic   [] Dry Needling    [x] Manual Therapy               [] Aquatic Therapy Electronically signed by:  Sonia Edwards PTA 11/19/2021, 7:14 AM

## 2021-11-22 NOTE — TELEPHONE ENCOUNTER
I recommend that he switch to taking it at bedtime to see if that helps with his fatigue. He should try that for about a week. If it does not help, he should call back.

## 2021-11-22 NOTE — TELEPHONE ENCOUNTER
Patient called stating that he thinks the Zoloft is effecting his sleep. Stated he has been sleeping 5 hours during the day and at night he sleeps more than he used to. Stated he is feeling more tired throughout the day. Patient has been taking 25 mg of Zoloft once a day.

## 2021-11-23 NOTE — FLOWSHEET NOTE
Outpatient Physical Therapy  [place]           [x] Phone: [phone]   Fax: [phone]  [facility]           [] Phone: [phone]   Fax: [phone]        Physical Therapy Daily Treatment Note  Date:  2021    Patient Name:  Young Albright    :  1965  MRN: [id number]  Restrictions/Precautions:    Diagnosis:    Chronic R shoulder pain  Date of Injury/Surgery:   Treatment Diagnosis:  R shoulder weakness, decreased ROM of R shoulder, R shoulder pain    Insurance/Certification information: [place] [id number] UNITS FROM -21  Referring Physician:   Dr. Miriam Park   Next Doctor Visit:    Plan of care signed (Y/N):  POC sent 21  Outcome Measure: Quick DASH: 43.18%  Visit# / total visits:  /12  Pain level: 0/10 currently at rest  Goals:         Short term goals  Time Frame for Short term goals: 6 visits  Short term goal 1: Pt will be independent with HEP to maximize rehab potential outside of clinic  Short term goal 2: Pt will improve R shoulder abd AROM to 110 degrees to aide in ability to perform ADLs  Long term goals  Time Frame for Long term goals : 12 visits  Long term goal 1: Pt will improve quick DASH score to a 33% or lower to show subjective patient improvement in function. Long term goal 2: Pt will improve global RUE strength to at least a 4+/5 to aide in ability to perform ADLs and overhead activities  Long term goal 3: Pt will improve R shoulder abd AROM to 150 degrees or more to aide in ability to perform overhead activities and reaching tasks. Summary of Evaluation: Assessment: Pt is a 63 yo male that presents to outpatient physical therapy with R shoulder pain, ROM deficits, and weakness. Positive Special Tests include Speeds test and Carrasco-Peewee. Tenderness to palpation was noted over the proximal biceps tendon and over the supraspinatus muscle belly. 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  Pt will benefit from skilled physical therapy to improve above listed impairments, aide in ADLs, and to progress toward goal completion. Subjective:  Pt reports of no pain yesterday or today with modified activity. Muscle soreness he was having last week is gone now. Any changes in Ambulatory Summary Sheet? None        Objective:  See eval   COVID screening questions were asked and patient attested that there had been no contact or symptoms    Remains tender with palpation along bicep tendon  Pain noted at end ranges with cane flex-- cued to stop just before end range to avoid pain  Cueing with SA Protraction exercise to keep motion at scap rather than at elbow;  Notable difficulty with Supinated shoulder flex ecc emphasis    Exercises: (No more than 4 columns)   Exercise/Equipment Date 11/10/21 #2 Date 11/16/21 #3 11/19/21 #4 11/23/21 #5            WARM UP          UBE 2'fwd/2'bkwd 2'fwd/2'bkwd            TABLE       Supine Cane assisted shld abd x10 5\" hold x10 5\" hold x10 5\"hold x10 5\"hold   Supine cane flex  x10 5\" hold x10 5\" hold x10 5\" hold   Supine cane ER   x10  x 10   Rhythmic stab  At 90 deg 20 sec x 3                      STANDING       Doorway Pec stretch 30 sec x 2 30 sec x 2     theraband Row RTB 10x2 GTB 10x2 GTB 2x10 GTB 2x10   TB shoulder ext   RTB x10 RTB 2x10   Low row - supinated  RTB 10x1 RTB 10x2 RTB x10 RTB 2x10   Supinated shoulder flex ecc emphasis 10x 2x10 YTB x10 YTB 10x   ER/IR with towel roll RTB 10x ea, did have some increase pain with ER post shld/ prox tricep IR RTB 2x10, ER YTB 2x10  IR RTB 2x10, ER YTB 2x10   Bicep curl   2# eccentric focus 15x 3# x15 3# x15   A-Z ball on wall   Pink ball A-Z Pink ball A-Z   SA CKC on elevated table   x10 10x   PROPRIOCEPTION                                          MODALITIES       vaso 10' 10' 10' 10'              Other Therapeutic Activities/Education: educated pt on HEP and importance of completing exercises outside of the clinic.       Home Exercise Program: Doorway Pec stretch  TB row  Supine Cane assisted AAROM shld abd  IR/ER with TB  Manual Treatments:  Cross fiber to bicep tendon/STM prox bicep, PROM flex/abd, post & inf mobs    Modalities: Patient received vasocompression x 10 min on low pressure. Patient had negative skin reaction afterwards. Communication with other providers:  POC sent on 11/8/21       Assessment:  Pt demonstrated overall good tolerance to today's session with no adverse reactions. Pt will continue to benefit from skilled physical therapy to improve above listed impairments, aide in ADLs, and to progress toward goal completion.   End pain 0/10       Plan for Next Session: R shld ROM, R shoulder strengthening, UBE, Biceps eccentrics,      Time In / Time Out:    5649-3427    If [employer] Please Indicate Units for Rx this date and running total for each and overall total for Rx to date:  CPT Code Units today Running Total Units Total approved    TE 44536  2 8    MAN 05669  1 4    Gait 57134      NR [address] [facility]  [address]       [address]      VASO 26169  1 4    ADL/Self care 10160      DNT 1-2 02121      DNT 3-4 77899      Other:     1           Total for episode of care   17             Timed Code/Total Treatment Minutes:  40/50, (2) TE, (1) MT, (1) vaso      Next Progress Note due:  10th visit      Plan of Care Interventions:  [x] Therapeutic Exercise  [x] Modalities:  [x] Therapeutic Activity     [] Ultrasound  [] Estim  [] Gait Training      [] Cervical Traction [] Lumbar Traction  [x] Neuromuscular Re-education    [x] Cold/hotpack [] Iontophoresis   [x] Instruction in HEP      [x] Vasopneumatic   [] Dry Needling    [x] Manual Therapy               [] Aquatic Therapy              Electronically signed by:  Madeleine Jamison PTA 11/23/2021, 9:46 AM

## 2021-11-30 NOTE — FLOWSHEET NOTE
Outpatient Physical Therapy  [place]           [x] Phone: [phone]   Fax: [phone]  [facility]           [] Phone: [phone]   Fax: [phone]        Physical Therapy Daily Treatment Note  Date:  2021    Patient Name:  Pedro Pablo Skinner    :  1965  MRN: [id number]  Restrictions/Precautions:    Diagnosis:    Chronic R shoulder pain  Date of Injury/Surgery:   Treatment Diagnosis:  R shoulder weakness, decreased ROM of R shoulder, R shoulder pain    Insurance/Certification information: [place] [id number] UNITS FROM -21  Referring Physician:   Dr. Eduardo Clark   Next Doctor Visit:    Plan of care signed (Y/N):  POC sent 21  Outcome Measure: Quick DASH: 43.18%  Visit# / total visits:    Pain level: 0/10 currently at rest  Goals:         Short term goals  Time Frame for Short term goals: 6 visits  Short term goal 1: Pt will be independent with HEP to maximize rehab potential outside of clinic  Short term goal 2: Pt will improve R shoulder abd AROM to 110 degrees to aide in ability to perform ADLs  Long term goals  Time Frame for Long term goals : 12 visits  Long term goal 1: Pt will improve quick DASH score to a 33% or lower to show subjective patient improvement in function. Long term goal 2: Pt will improve global RUE strength to at least a 4+/5 to aide in ability to perform ADLs and overhead activities  Long term goal 3: Pt will improve R shoulder abd AROM to 150 degrees or more to aide in ability to perform overhead activities and reaching tasks. Summary of Evaluation: Assessment: Pt is a 63 yo male that presents to outpatient physical therapy with R shoulder pain, ROM deficits, and weakness. Positive Special Tests include Speeds test and Carrasco-Peewee. Tenderness to palpation was noted over the proximal biceps tendon and over the supraspinatus muscle belly. 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  Pt will benefit from skilled physical therapy to improve above listed impairments, aide in ADLs, and to progress toward goal completion. Subjective:  Pt arrives with no complaints of current pain, noting he feels that he has increased his mobility since last week. He reports he still favors using his L arm while performing tasks        Any changes in Ambulatory Summary Sheet? None        Objective:  See eval   COVID screening questions were asked and patient attested that there had been no contact or symptoms    Cueing to keep elbow straight with A-Z ball exercise to keep motion at shoulder only. Pt needs cueing to not go into painful ROM with supine cane flex  Muscle weakness noted with supinated shoulder flexion  Cueing to use scapular muscles with TB and prone exercises-- weakness noted  Exercises: (No more than 4 columns)   Exercise/Equipment 11/19/21 #4 11/23/21 #5 11/30/21 #6           WARM UP         UBE   2' fwd/2' backward         TABLE      Supine Cane assisted shld abd x10 5\"hold x10 5\"hold x10 5\" hold   Supine cane flex x10 5\" hold x10 5\" hold 1# x10 5\" hold   Supine cane ER x10  x 10 x10 5\" hold   Rhythmic stab      Prone rows   3# x10   Prone Scaption   1# x10   Prone horz abd   1# x10   STANDING      Doorway Pec stretch      theraband Row GTB 2x10 GTB 2x10    TB shoulder ext RTB x10 RTB 2x10 RTB x10   Low row - supinated  RTB x10 RTB 2x10    Supinated shoulder flex ecc emphasis YTB x10 YTB 10x YTB x10    ER/IR with towel roll  IR RTB 2x10, ER YTB 2x10 ER YTB 2x10     Bicep curl  3# x15 3# x15    A-Z ball on wall Pink ball A-Z Pink ball A-Z Pink ball A-Z   SA CKC on elevated table x10 10x    PROPRIOCEPTION                                    MODALITIES      vaso 10' 10' 10'              Other Therapeutic Activities/Education: educated pt on HEP and importance of completing exercises outside of the clinic.       Home Exercise Program:   Doorway Pec stretch  TB row  Supine Cane assisted AAROM shld abd  IR/ER with TB  Manual Treatments:  Cross frictional massage to bicep tendon/STM prox bicep,    Modalities: Patient received vasocompression x 10 min on low pressure. Patient had no negative skin reaction afterwards. Communication with other providers:  POC sent on 11/8/21       Assessment:  Pt tolerated today's treatment session well today with no adverse reactions to any given exercise. Pt tolerated exercises well today, still showing notable weakness in scapular muscles and shoulder muscles. Pt will benefit from continued therapy to address remaining strength and mobility impairments, reduce pain, and improve overall function to return to [facility] and reduce the risk of future decline/reinjury.   End pain 0/10       Plan for Next Session: R shld ROM, R shoulder strengthening, UBE, Biceps eccentrics, scapular strengthening,      Time In / Time Out:    2374-1951    If [employer] Please Indicate Units for Rx this date and running total for each and overall total for Rx to date:  CPT Code Units today Running Total Units Total approved    TE [address]1957  [address]  1 5    Gait [address]22      NR 79462      TA  23120      Estim Unatt [address]       [address] [facility] [address]      VASO 51406  1 5    ADL/Self care 16474      DNT 1-2 29836      DNT 3-4 60314      Other:     1           Total for episode of care   21             Timed Code/Total Treatment Minutes:  46/56, 10' vaso x1; 12' Manual x1; 30' TE x2; 4' NMR x0      Next Progress Note due:  10th visit      Plan of Care Interventions:  [x] Therapeutic Exercise  [x] Modalities:  [x] Therapeutic Activity     [] Ultrasound  [] Estim  [] Gait Training      [] Cervical Traction [] Lumbar Traction  [x] Neuromuscular Re-education    [x] Cold/hotpack [] Iontophoresis   [x] Instruction in HEP      [x] Vasopneumatic   [] Dry Needling    [x] Manual Therapy               [] Aquatic Therapy              Electronically signed by:  PAUL Botello 11/30/2021, 6:57 AM

## 2021-12-02 NOTE — PROGRESS NOTES
12/2/2021  sleep study  for [facility] [place]  1965 is complete. Results are pending physician review.     Electronically signed by David Matos on 12/2/2021 at 7:02 AM

## 2021-12-07 NOTE — FLOWSHEET NOTE
Outpatient Physical Therapy  [place]           [x] Phone: [phone]   Fax: [phone]  [facility] [place]           [] Phone: [phone]   Fax: [phone]        Physical Therapy Daily Treatment Note  Date:  2021    Patient Name:  Yanique Tobias    :  1965  MRN: [id number]  Restrictions/Precautions:    Diagnosis:    Chronic R shoulder pain  Date of Injury/Surgery:   Treatment Diagnosis:  R shoulder weakness, decreased ROM of R shoulder, R shoulder pain    Insurance/Certification information: [employer] - 96 UNITS FROM -21  Referring Physician:   Dr. Marla Gordon   Next Doctor Visit:    Plan of care signed (Y/N):  yes  Outcome Measure: Quick DASH: 43.18%  Visit# / total visits:    Pain level: 0/10   Goals:         Short term goals  Time Frame for Short term goals: 6 visits  Short term goal 1: Pt will be independent with HEP to maximize rehab potential outside of clinic MET   Short term goal 2: Pt will improve R shoulder abd AROM to 110 degrees to aide in ability to perform ADLs MET    Long term goals  Time Frame for Long term goals : 12 visits  Long term goal 1: Pt will improve quick DASH score to a 33% or lower to show subjective patient improvement in function. Long term goal 2: Pt will improve global RUE strength to at least a 4+/5 to aide in ability to perform ADLs and overhead activities  Long term goal 3: Pt will improve R shoulder abd AROM to 150 degrees or more to aide in ability to perform overhead activities and reaching tasks. Summary of Evaluation: Assessment: Pt is a 63 yo male that presents to outpatient physical therapy with R shoulder pain, ROM deficits, and weakness. Positive Special Tests include Speeds test and Carrasco-Peewee. Tenderness to palpation was noted over the proximal biceps tendon and over the supraspinatus muscle belly. 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  Pt will benefit from skilled physical therapy to improve above listed impairments, aide in ADLs, and to progress toward goal completion. Subjective:  Pt is doing well this date. He is not currently having any pain but has not done much yet this morning. Pt also notes he feels his motion is improving. Any changes in Ambulatory Summary Sheet? None      Objective:    COVID screening questions were asked and patient attested that there had been no contact or symptoms    AROM R shoulder abd: 119 deg    Exercises: (No more than 4 columns)   Exercise/Equipment 11/23/21 #5 11/30/21 #6 12/7/21 #7           WARM UP         UBE  2' fwd/2' backward 2'/2'         TABLE      Supine Cane assisted shld abd x10 5\"hold x10 5\" hold 2# bar x10, 5\"   Supine cane flex x10 5\" hold 1# x10 5\" hold 2# bar x10, 5\"   Supine cane ER  x 10 x10 5\" hold 2# bar x10, 5\"   Rhythmic stab      Prone rows  3# x10    Prone Scaption  1# x10 Flex x10, 2#   Prone horz abd  1# x10 x10 2#   ABCs   2# x1   STANDING      Doorway Pec stretch      theraband Row GTB 2x10  GTB 2x10   TB shoulder ext RTB 2x10 RTB x10 GTB 2x10   Low row - supinated  RTB 2x10     Supinated shoulder flex ecc emphasis YTB 10x YTB x10     ER/IR with towel roll IR RTB 2x10, ER YTB 2x10 ER YTB 2x10   RTB 2x10 ea    Bicep curl  3# x15     A-Z ball on wall Pink ball A-Z Pink ball A-Z    SA CKC on elevated table 10x     PROPRIOCEPTION                                    MODALITIES      vaso 10' 10'  10'             Other Therapeutic Activities/Education: educated pt on HEP    Home Exercise Program:   Doorway Pec stretch  TB row  Supine Cane assisted AAROM shld abd  IR/ER with TB  Manual Treatments:      Modalities: Patient received vasocompression x 10 min on low pressure R shoulder in sitting. Patient had no negative skin reaction afterwards. Communication with other providers:  POC sent on 11/8/21     Assessment:  Pt tolerated today's treatment session well today with no adverse reactions to any given exercise.  Pt did well this date with no increased pain with increased weight on exercises. Pt will benefit from continued therapy to address remaining strength and mobility impairments, reduce pain, and improve overall function to return to PLOF and reduce the risk of future decline/reinjury.   End pain 0/10     Plan for Next Session: R shld ROM, R shoulder strengthening, UBE, Biceps eccentrics, scapular strengthening,    Time In / Time Out:  7520 - 7987    If [employer] Please Indicate Units for Rx this date and running total for each and overall total for Rx to date:  CPT Code Units today Running Total Units Total approved    TE 94567  2 12    MAN 02808   5    Gait 09148      NR 69738 1 1    TA  26258      Estim Unatt 70429        [address]      [facility] [place] [address]      VASO 21773  1 6    ADL/Self care 05772      DNT 1-2 01644      DNT 3-4 69769      Other:     1           Total for episode of care   25 96        Timed Code/Total Treatment Minutes:  41'/51': 10' vaso x1, 12' NMR x1, 29' TE x2    Next Progress Note due:  10th visit     Plan of Care Interventions:  [x] Therapeutic Exercise  [x] Modalities:  [x] Therapeutic Activity     [] Ultrasound  [] Estim  [] Gait Training      [] Cervical Traction [] Lumbar Traction  [x] Neuromuscular Re-education    [x] Cold/hotpack [] Iontophoresis   [x] Instruction in HEP      [x] Vasopneumatic   [] Dry Needling    [x] Manual Therapy               [] Aquatic Therapy              Electronically signed by:  Rachel Garcia PT, DPT 12/7/2021, 9:44 AM

## 2021-12-09 NOTE — FLOWSHEET NOTE
Outpatient Physical Therapy  [place]           [x] Phone: [phone]   Fax: [phone]  [facility]           [] Phone: [phone]   Fax: [phone]        Physical Therapy Daily Treatment Note  Date:  2021    Patient Name:  Julia Jamil    :  1965  MRN: [id number]  Restrictions/Precautions:    Diagnosis:    Chronic R shoulder pain  Date of Injury/Surgery:   Treatment Diagnosis:  R shoulder weakness, decreased ROM of R shoulder, R shoulder pain    Insurance/Certification information: [place] [id number] UNITS FROM -21  Referring Physician:   Dr. Dre Armenta   Next Doctor Visit:    Plan of care signed (Y/N):  yes  Outcome Measure: Quick DASH: 29.54%  Visit# / total visits:    Pain level: 0/10   Goals:         Short term goals  Time Frame for Short term goals: 6 visits  Short term goal 1: Pt will be independent with HEP to maximize rehab potential outside of clinic MET    Short term goal 2: Pt will improve R shoulder abd AROM to 110 degrees to aide in ability to perform ADLs MET    Long term goals  Time Frame for Long term goals : 12 visits  Long term goal 1: Pt will improve quick DASH score to a 33% or lower to show subjective patient improvement in function. MET    Long term goal 2: Pt will improve global RUE strength to at least a 4+/5 to aide in ability to perform ADLs and overhead activities Progressing    Long term goal 3: Pt will improve R shoulder abd AROM to 150 degrees or more to aide in ability to perform overhead activities and reaching tasks. MET      Summary of Evaluation: Assessment: Pt is a 65 yo male that presents to outpatient physical therapy with R shoulder pain, ROM deficits, and weakness. Positive Special Tests include Speeds test and Carrasco-Peewee. Tenderness to palpation was noted over the proximal biceps tendon and over the supraspinatus muscle belly. 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  Pt will benefit from skilled physical therapy to improve above listed impairments, aide in ADLs, and to progress toward goal completion. Subjective:  Pt arrives to therapy today with no complaints of pain, but says he has not been very active today yet. Pt feels that he has improved but still feels that he is not ready to be done with therapy. Any changes in Ambulatory Summary Sheet?   None      Objective:    COVID screening questions were asked and patient attested that there had been no contact or symptoms    AROM R shoulder abd: 150deg with pain noted at end range and on way back to neutral    MMT: R UE:   Shld flex:4+/5  shld abd: 4-/5 with pain  shld ext: 5/5  shld IR: 5/5  Shld ER: 4+/5  Elbow flex:4+/5  Elbow ext: 4+/5    Exercises: (No more than 4 columns)   Exercise/Equipment 11/30/21 #6 12/7/21 #7 12/9/21 #8           WARM UP         UBE 2' fwd/2' backward 2'/2' 2'/2'         TABLE      Supine Cane assisted shld abd x10 5\" hold 2# bar x10, 5\" 2# bar x10, 5\"   Supine cane flex 1# x10 5\" hold 2# bar x10, 5\" 2# bar x10, 5\"   Supine cane ER x10 5\" hold 2# bar x10, 5\" 2# bar x10, 5\"   Rhythmic stab      Prone rows 3# x10     Prone Scaption 1# x10 Flex x10, 2# 2# x10   Prone horz abd 1# x10 x10 2# 2# x10   ABCs  2# x1    STANDING      Doorway Pec stretch      shld abd   1# in pain free ROM   theraband Row  GTB 2x10 GTB x10   TB shoulder ext RTB x10 GTB 2x10 GTB x10   Low row - supinated       Supinated shoulder flex ecc emphasis YTB x10      ER/IR with towel roll ER YTB 2x10   RTB 2x10 ea     Bicep curl       A-Z ball on wall Pink ball A-Z     SA CKC on elevated table      PROPRIOCEPTION                                    MODALITIES      vaso 10'  10' 10'             Other Therapeutic Activities/Education: educated pt on HEP    Home Exercise Program:   Doorway Pec stretch  TB row  Supine Cane assisted AAROM shld abd  IR/ER with TB  Manual Treatments: none    Modalities: Patient received vasocompression x 10 min

## 2021-12-09 NOTE — PROGRESS NOTES
Outpatient Physical Therapy           [place]           [x] Phone: [phone]   Fax: [phone]  [facility]           [] Phone: [phone]   Fax: [phone]      To:   Dr. Latia Lo     From: Tiffanie Tyson, PAUL     Patient: Astrid Awad                  : 1965  Diagnosis:     Chronic R shoulder pain     Date: 2021  Treatment Diagnosis:   R shoulder weakness, decreased ROM of R shoulder, R shoulder pain        [x]  Progress Note                []  Discharge Note    Evaluation Date: 21    Total Visits to date:  8  Cancels/No-shows to date:  1    Subjective:  Pt arrives to therapy today with no complaints of pain, but says he has not been very active today yet. Pt feels that he has improved but still feels that he is not ready to be done with therapy. QuickDASH 29.54%    Plan of Care/Treatment to date:  [x] Therapeutic Exercise    [x] Modalities:  [x] Therapeutic Activity     [] Ultrasound  [] Electrical Stimulation  [] Gait Training      [] Cervical Traction   [] Lumbar Traction  [x] Neuromuscular Re-education  [x] Cold/hotpack [] Iontophoresis  [x] Instruction in HEP      Other:  [x] Manual Therapy       [x]  Vasopneumatic  [] Aquatic Therapy       []   Dry Needle Therapy                      Objective/Significant Findings At Last Visit/Comments:    AROM R shoulder abd: 150deg with pain noted at end range and on way back to neutral     MMT: R UE:   Shld flex:4+/5  shld abd: 4-/5 with pain  shld ext: 5/5  shld IR: 5/5  Shld ER: 4+/5  Elbow flex:4+/5  Elbow ext: 4+/5    Assessment:    Pt tolerated today's treatment session well today with no adverse reactions to any given exercise. Pts goals were reassessed on this date, with patient meeting 4/5 goals set for therapy. Pt has shown continued progression in ROM and Strength, but still shows significant strength deficits and pain in abd.   Pt will benefit from continued therapy to address remaining strength and mobility impairments, reduce pain, and improve overall function to return to PLOF and reduce the risk of future decline/reinjury. End pain: 0/10    Goal Status:  [] Achieved [x] Partially Achieved  [] Not Achieved     Changes to goals:    Short term goals  Time Frame for Short term goals: 6 visits  Short term goal 1: Pt will be independent with HEP to maximize rehab potential outside of clinic MET 12/9   Short term goal 2: Pt will improve R shoulder abd AROM to 110 degrees to aide in ability to perform ADLs MET 12/9   Long term goals  Time Frame for Long term goals : 12 visits  Long term goal 1: Pt will improve quick DASH score to a 33% or lower to show subjective patient improvement in function. MET 12/9   Long term goal 2: Pt will improve global RUE strength to at least a 4+/5 to aide in ability to perform ADLs and overhead activities Progressing 12/9   Long term goal 3: Pt will improve R shoulder abd AROM to 150 degrees or more to aide in ability to perform overhead activities and reaching tasks. MET 12/9             Frequency/Duration:  # Days per week: [] 1 day # Weeks: [] 1 week [] 4 weeks [] 8 weeks     [x] 2 days   [] 2 weeks [x] 5 weeks [] 10 weeks     [] 3 days   [] 3 weeks [] 6 weeks [] 12 weeks       Rehab Potential: [] Excellent [x] Good [] Fair  [] Poor         Patient Status: [] Continue per initial plan of Care     [] Patient now discharged     [x] Additional visits requested, Please re-certify for additional visits:      Requested frequency/duration: 2/week for 5weeks (22 visits total on new POC)      If we are requesting more visits, we fully anticipate the patient's condition is expected to improve within the treatment timeframe we are requesting. Electronically signed by:  PAUL Douglass, 12/9/2021, 8:11 AM   Madison Guerra PT, DPT    If you have any questions or concerns, please don't hesitate to call.   Thank you for your referral.    Physician Signature:______________________ Date:______ Time: ________  By signing above, therapists plan is approved by physician

## 2021-12-16 PROBLEM — S22.080D: Status: ACTIVE | Noted: 2021-01-01

## 2021-12-16 NOTE — FLOWSHEET NOTE
Outpatient Physical Therapy  [place]           [x] Phone: [phone]   Fax: [phone]  [facility]           [] Phone: [phone]   Fax: [phone]        Physical Therapy Daily Treatment Note  Date:  2021    Patient Name:  Yasmin Gordon    :  1965  MRN: [id number]  Restrictions/Precautions:    Diagnosis:    Chronic R shoulder pain  Date of Injury/Surgery:   Treatment Diagnosis:  R shoulder weakness, decreased ROM of R shoulder, R shoulder pain    Insurance/Certification information: [place] [address] UNITS FROM -21  Referring Physician:   Dr. Shanel Matson   Next Doctor Visit:    Plan of care signed (Y/N):  yes  Outcome Measure: Quick DASH: 29.54%  Visit# / total visits:    Pain level: 0/10 currently  Goals:         Short term goals  Time Frame for Short term goals: 6 visits  Short term goal 1: Pt will be independent with HEP to maximize rehab potential outside of clinic MET    Short term goal 2: Pt will improve R shoulder abd AROM to 110 degrees to aide in ability to perform ADLs MET    Long term goals  Time Frame for Long term goals : 12 visits  Long term goal 1: Pt will improve quick DASH score to a 33% or lower to show subjective patient improvement in function. MET    Long term goal 2: Pt will improve global RUE strength to at least a 4+/5 to aide in ability to perform ADLs and overhead activities Progressing    Long term goal 3: Pt will improve R shoulder abd AROM to 150 degrees or more to aide in ability to perform overhead activities and reaching tasks. MET      Summary of Evaluation: Assessment: Pt is a 65 yo male that presents to outpatient physical therapy with R shoulder pain, ROM deficits, and weakness. Positive Special Tests include Speeds test and Carrasco-Peewee. Tenderness to palpation was noted over the proximal biceps tendon and over the supraspinatus muscle belly. 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 Pt will benefit from skilled physical therapy to improve above listed impairments, aide in ADLs, and to progress toward goal completion. Subjective:  Pt states he continues to have increase pain with reaching out to his side rating 7-8/10. Any changes in Ambulatory Summary Sheet?   None      Objective:    COVID screening questions were asked and patient attested that there had been no contact or symptoms    AROM R shoulder abd: 150deg with pain noted at end range and on way back to neutral    MMT: R UE:   Shld flex:4+/5  shld abd: 4-/5 with pain  shld ext: 5/5  shld IR: 5/5  Shld ER: 4+/5  Elbow flex:4+/5  Elbow ext: 4+/5    Exercises: (No more than 4 columns)   Exercise/Equipment 11/30/21 #6 12/7/21 #7 12/9/21 #8 12/16/21 #9            WARM UP          UBE 2' fwd/2' backward 2'/2' 2'/2' 2'/2'          TABLE       Supine Cane assisted shld abd x10 5\" hold 2# bar x10, 5\" 2# bar x10, 5\" Standing 1# cane abd 10x   Supine cane flex 1# x10 5\" hold 2# bar x10, 5\" 2# bar x10, 5\" Seated 1# cane flex 10x   Supine cane ER x10 5\" hold 2# bar x10, 5\" 2# bar x10, 5\" 2# bar x10, 5\"   Rhythmic stab       Prone rows 3# x10      Prone Scaption 1# x10 Flex x10, 2# 2# x10 2# x10   Prone horz abd 1# x10 x10 2# 2# x10 2# x10   ABCs  2# x1     STANDING       corewheel    Flex and scap 10x ea   Doorway Pec stretch       shld abd   1# in pain free ROM 1# in pain free ROM   theraband Row  GTB 2x10 GTB x10 GTB x10   TB shoulder ext RTB x10 GTB 2x10 GTB x10 GTB x10   Low row - supinated        Supinated shoulder flex ecc emphasis YTB x10       ER/IR with towel roll ER YTB 2x10   RTB 2x10 ea      Bicep curl        A-Z ball on wall Pink ball A-Z      SA CKC on elevated table       PROPRIOCEPTION                                          MODALITIES       vaso 10'  10' 10' Pt requested STM to ant shld and along bicep today instead of vaso              Other Therapeutic Activities/Education: educated pt on HEP    Home Exercise Program: Doorway Pec stretch  TB row  Supine Cane assisted AAROM shld abd  IR/ER with TB  Manual Treatments: STM to ant shld and along bicep x 5 min    Modalities:     Communication with other providers:  POC sent on 11/8/21     Assessment:  Pt demonstrated overall good tolerance to today's session w/o adverse reaction. Pt progressing towards goals. Biggest complaint is increase pain with reaching out to his side. Pt will benefit from continued therapy to address remaining strength and mobility impairments, reduce pain, and improve overall function to return to PLOF and reduce the risk of future decline/reinjury.   End pain 0/10     Plan for Next Session: R shld ROM, R shoulder strengthening, UBE, Biceps eccentrics, scapular strengthening,    Time In / Time Out:  9837 - 8538    If [employer] Please Indicate Units for Rx this date and running total for each and overall total for Rx to date:  CPT Code Units today Running Total Units Total approved    TE 99289  3 18    MAN 87898   5    Gait 16566      NR 83756  1    TA  23958      Estim Unatt [address]       [facility] [place] [address]      VASO 80079   7    ADL/Self care 25958      DNT 1-2 43097      DNT 3-4 75235      Other:     1           Total for episode of care   32 96        Timed Code/Total Treatment Minutes:  45/45, (3) TE  Next Progress Note due:  10th visit     Plan of Care Interventions:  [x] Therapeutic Exercise  [x] Modalities:  [x] Therapeutic Activity     [] Ultrasound  [] Estim  [] Gait Training      [] Cervical Traction [] Lumbar Traction  [x] Neuromuscular Re-education    [x] Cold/hotpack [] Iontophoresis   [x] Instruction in HEP      [x] Vasopneumatic   [] Dry Needling    [x] Manual Therapy               [] Aquatic Therapy              Electronically signed by:  Ed Rabago, MUNIRA 12/16/2021, 9:50 AM

## 2021-12-16 NOTE — ASSESSMENT & PLAN NOTE
He and I both feel that the persistent back pain is mostly due to the muscles just needing to strengthen and get back to normal.  I will refer him to physical therapy for this.

## 2021-12-16 NOTE — PROGRESS NOTES
12/16/21    Beverley Loser  1965  64 y.o. male   Adult Annual Preventive Visit  Chief Complaint   Patient presents with    Annual Exam       Activity habits: He is doing phys therapy for shoulder. He has had limited outside activity for exercise. Eating habits: OK but snacking too much. \"That's my fault. \" Previously was drinking more water. Sleep habits: Pretty good. Social support: Good, Parents and sisters. Loneliness: yes - He lives with parents and activities are limited. He moved here from [place]. Mentation:   Goes to a counselor. Trouble with memory or thinking clearly: Always has had a bad memory. E/M:  Back: He had 2 compression fractures in April, T11 &12 that were supposed to heal on their own over 8 months since April. He still has some discomfort there with laying down or trying to bend over. It does not hurt if walking or sitting. It hurts with reaching. Review of Systems   Constitutional: Positive for fatigue. Negative for fever. HENT: Negative for nosebleeds and trouble swallowing. Eyes: Negative for pain and visual disturbance. Respiratory: Negative for cough and shortness of breath. Sleep study results pending   Cardiovascular: Negative for chest pain and leg swelling. Gastrointestinal: Negative for abdominal pain, constipation, diarrhea, nausea and vomiting. Endocrine: Negative for cold intolerance, heat intolerance and polyuria. Genitourinary: Negative for difficulty urinating, hematuria, scrotal swelling and testicular pain. Musculoskeletal: Negative for arthralgias and gait problem. Skin: Negative for rash and wound. Allergic/Immunologic: Negative for food allergies and immunocompromised state. Neurological: Negative for speech difficulty, light-headedness and headaches. Hematological: Negative for adenopathy. Does not bruise/bleed easily. Psychiatric/Behavioral: Positive for dysphoric mood.  Negative for decreased concentration, self-injury and suicidal ideas. The patient is nervous/anxious. Fasting: No    No Known Allergies     Current Outpatient Medications   Medication Sig Dispense Refill    sertraline (ZOLOFT) 25 MG tablet Take 1 tablet by mouth daily 90 tablet 0    atorvastatin (LIPITOR) 80 MG tablet Take 1 tablet by mouth daily 90 tablet 2    bisacodyl (BISACODYL) 5 MG EC tablet Take 4 tablets once for colonoscopy prep 4 tablet 0    clonazePAM (KLONOPIN) 0.5 MG disintegrating tablet Take 1 tablet by mouth 2 times daily as needed.  lamoTRIgine (LAMICTAL) 150 MG tablet Take 150 mg by mouth 2 times daily.  topiramate (TOPAMAX) 200 MG tablet Take 200 mg by mouth 2 times daily. Takes 200 mg in am and 300 mg in pm.      cloBAZam 20 MG TABS Take 1 tablet by mouth 2 times daily. No current facility-administered medications for this visit. OBJECTIVE    /72 (Site: Left Upper Arm, Position: Sitting, Cuff Size: Medium Adult)   Pulse 61   Temp 97.2 °F (36.2 °C) (Infrared)   Wt 186 lb 9.6 oz (84.6 kg)   SpO2 96%   BMI 26.03 kg/m²     Physical Exam   Constitutional:       General: Not in acute distress. Appearance: Normal appearance. Not ill-appearing, toxic-appearing or diaphoretic. HENT:      Head: Normocephalic. Right Ear: Tympanic membrane, ear canal and external ear normal.      Left Ear: Tympanic membrane, ear canal and external ear normal.      Nose: No rhinorrhea. Mouth/Throat:      Mouth: Mucous membranes are moist.      Pharynx: Oropharynx is clear. No oropharyngeal exudate or posterior oropharyngeal erythema. Eyes:      General: No scleral icterus. Right eye: No discharge. Left eye: No discharge. Conjunctiva/sclera: Conjunctivae normal.   Neck:      Thyroid: No thyroid mass, thyromegaly or thyroid tenderness. Cardiovascular:      Rate and Rhythm: Normal rate and regular rhythm.       Pulses:           Posterior tibial pulses are 2+ on the right side and 2+ on the left side. Heart sounds: Normal heart sounds. No murmur heard. No friction rub. No gallop. Pulmonary:      Effort: Pulmonary effort is normal. No respiratory distress. Breath sounds: Normal breath sounds. No stridor. No wheezing, rhonchi or rales. Abdominal:      General: There is no distension. Palpations: Abdomen is soft. Tenderness: There is no abdominal tenderness. There is no guarding. Musculoskeletal:         General: No deformity. Cervical back: No rigidity. Right lower leg: No edema. Left lower leg: No edema. Lymphadenopathy:      Cervical: No cervical adenopathy. Right upper body: No supraclavicular or axillary adenopathy. Left upper body: No supraclavicular or axillary adenopathy. Lower Body: No right inguinal adenopathy. No left inguinal adenopathy. Skin:     General: Skin is warm. Coloration: Skin is not jaundiced. Neurological:      Mental Status: She is alert. Deep Tendon Reflexes:      Reflex Scores:       Patellar reflexes are 2+ on the right side and 2+ on the left side. Psychiatric:         Attention and Perception: Attention and perception normal.         Mood and Affect: Mood normal.         Speech: Speech normal.         Behavior: Behavior normal.         Thought Content: Thought content normal.    ASSESSMENT AND PLAN    1. Encounter for well adult exam without abnormal findings  2. Moderate episode of recurrent major depressive disorder [facility])  Assessment & Plan:   He is currently doing very well with counseling and with sertraline 25 mg daily. I will send a 90-day prescription of the sertraline. Orders:  -     sertraline (ZOLOFT) 25 MG tablet; Take 1 tablet by mouth daily, Disp-90 tablet, R-0Normal  3.  Open wedge compression fracture of T11 vertebra with routine healing, subsequent encounter  Assessment & Plan:   He and I both feel that the persistent back pain is mostly due to the muscles just needing to strengthen and get back to normal.  I will refer him to physical therapy for this. Orders:  -     Reji Weiner De [address]ocha 647  4. Open wedge compression fracture of T12 vertebra with routine healing  Assessment & Plan:   Physical therapy as for the T11 vertebrae. Orders:  -     Reji Weiner De [address]ocha 647  5. Open wedge compression fracture of T11 vertebra with routine healing  Assessment & Plan:   He and I both feel that the persistent back pain is mostly due to the muscles just needing to strengthen and get back to normal.  I will refer him to physical therapy for this. Counseling provided for:  >> Healthy eating - 1> avoid sugar and other refined carbohydrates; 2> eat more healthy unsaturated fats (runny at room temperature like oils and nut oils and fish oils and avocados); 3> eat more foods with fiber. >> Exercise - 1> try to do 150 minutes a week of exercise that is as hard as walking briskly (30 minutes 5 days a week or 22 minutes every day); and 2> do some strength training 2 or 3 times a week. Social support - Keep socially involved. This will help with keeping your brain working well (avoiding dementia) as you get older and also help you to be happier. , Sleep hygeine - Get enough rest. Things that help are making sure the room is dark and cool, avoiding screen use for 1-2 hours before bedtime, having an unwinding routine. , Mentally activity - Keep trying to learn new things, reading, following sports/fashion/whatever you like, and other mental things to keep your brain working well and avoid dementia. , Alcohol limitation - Limit alcohol consumption to 1(women) or 2(men) standard sized drinks a day. and Cannabis - Heavy cannabis use will lead to a significant decline in your IQ. Return in about 3 months (around 3/16/2022) for mood and recchek TSH and GFR.      Samanta Park MD

## 2021-12-16 NOTE — PATIENT INSTRUCTIONS
Advance Directives: Care Instructions  Overview  An advance directive is a legal way to state your wishes at the end of your life. It tells your family and your doctor what to do if you can't say what you want. There are two main types of advance directives. You can change them any time your wishes change. Living will. This form tells your family and your doctor your wishes about life support and other treatment. The form is also called a declaration. Medical power of . This form lets you name a person to make treatment decisions for you when you can't speak for yourself. This person is called a health care agent (health care proxy, health care surrogate). The form is also called a durable power of  for health care. If you do not have an advance directive, decisions about your medical care may be made by a family member, or by a doctor or a  who doesn't know you. It may help to think of an advance directive as a gift to the people who care for you. If you have one, they won't have to make tough decisions by themselves. Follow-up care is a key part of your treatment and safety. Be sure to make and go to all appointments, and call your doctor if you are having problems. It's also a good idea to know your test results and keep a list of the medicines you take. What should you include in an advance directive? Many states have a unique advance directive form. (It may ask you to address specific issues.) Or you might use a universal form that's approved by many states. If your form doesn't tell you what to address, it may be hard to know what to include in your advance directive. Use the questions below to help you get started. · Who do you want to make decisions about your medical care if you are not able to? · What life-support measures do you want if you have a serious illness that gets worse over time or can't be cured? · What are you most afraid of that might happen? (Maybe you're afraid of having pain, losing your independence, or being kept alive by machines.)  · Where would you prefer to die? (Your home? A hospital? A nursing home?)  · Do you want to donate your organs when you die? · Do you want certain Holiness practices performed before you die? When should you call for help? Be sure to contact your doctor if you have any questions. Where can you learn more? Go to https://chpepiceweb.Youneeq. org and sign in to your Yandex account. Enter R264 in the VQiao.com box to learn more about \"Advance Directives: Care Instructions. \"     If you do not have an account, please click on the \"Sign Up Now\" link. Current as of: March 17, 2021               Content Version: 13.0  © 2006-2021 Healthwise, Incorporated. Care instructions adapted under license by [facility] ([facility]). If you have questions about a medical condition or this instruction, always ask your healthcare professional. [place] [address] any warranty or liability for your use of this information. Well Visit, Men 48 to 72: Care Instructions  Overview     Well visits can help you stay healthy. Your doctor has checked your overall health and may have suggested ways to take good care of yourself. Your doctor also may have recommended tests. At home, you can help prevent illness with healthy eating, regular exercise, and other steps. Follow-up care is a key part of your treatment and safety. Be sure to make and go to all appointments, and call your doctor if you are having problems. It's also a good idea to know your test results and keep a list of the medicines you take. How can you care for yourself at home? · Get screening tests that you and your doctor decide on. Screening helps find diseases before any symptoms appear. · Eat healthy foods. Choose fruits, vegetables, whole grains, protein, and low-fat dairy foods. Limit fat, especially saturated fat.  Reduce salt in block UV rays. Even when it's cloudy, put broad-spectrum sunscreen (SPF 30 or higher) on any exposed skin. · See a dentist one or two times a year for checkups and to have your teeth cleaned. · Wear a seat belt in the car. When should you call for help? Watch closely for changes in your health, and be sure to contact your doctor if you have any problems or symptoms that concern you. Where can you learn more? Go to https://Infinity Telemedicine GrouppeCorous360.Power Liens. org and sign in to your Wilocity account. Enter W071 in the Cyber Gifts box to learn more about \"Well Visit, Men 48 to 72: Care Instructions. \"     If you do not have an account, please click on the \"Sign Up Now\" link. Current as of: February 11, 2021               Content Version: 13.0  © 2006-2021 newMentor. Care instructions adapted under license by [facility] ([facility]). If you have questions about a medical condition or this instruction, always ask your healthcare professional. [place] [address] any warranty or liability for your use of this information. Learning About Changing a Habit by Setting Goals  How can you change a habit? If you've decided to change a habitwhether it's quitting smoking, lowering your blood pressure, becoming more active, or doing something else to improve your healthcongratulations! Making that decision is the first step toward making a change. What happens next? Have a reason. Set goals you can reach. Prepare for slip-ups. And get support. What's your reason? Your reason for wanting to change a habit is really important. Maybe you want to quit smoking so that you can avoid future health problems. Or maybe you want to eat a healthier diet so you can lose weight. If you have high blood pressure, your reason may be clear: to lower your blood pressure. Maybe you smoke and want to save money on cigarettes. You need to feel ready to make a change.  If you don't feel ready now, that's got in the way of reaching your goal. Positive thinking goes a long way when you're making lifestyle changes. How can you get support? · Get a partner. It's motivating to know that someone is trying to make the same change that you're making, like being more active or changing your eating habits. You have someone who is counting on you to help them succeed. That person can also remind you how far you've come. · Get friends and family involved. They can exercise with you. Or they can encourage you by saying how they admire what you are doing. Family members can join you in your healthy eating efforts. Don't be afraid to tell family and friends that their encouragement makes a big difference to you. · Join a class or support group. People in these groups often have some of the same barriers you have. They can give you support when you don't feel like staying with your plan. They can boost your morale when you need a lift. Aleisha Chandra also find a number of online support groups. · Encourage yourself. When you feel like giving up, don't waste energy feeling bad about yourself. Remember your reason for wanting to change, think about the progress you've made, and give yourself a pep talk and a pat on the back. · Get professional help. A dietitian can help you make your diet healthier while still allowing you to eat foods that you enjoy. A  or physical therapist can help design an exercise program that is fun and easy to stay on. A counselor, a , or your doctor can help you overcome hurdles, reduce stress, or quit smoking. Where can you learn more? Go to https://alfredo.BrandFiesta. org and sign in to your CarWoo! account. Enter K654 in the BioMedomics box to learn more about \"Learning About Changing a Habit by Setting Goals. \"     If you do not have an account, please click on the \"Sign Up Now\" link.   Current as of: June 16, 2021               Content Version: 13.0  © 1453-9690 Healthwise, Incorporated. Care instructions adapted under license by [facility] ([facility]). If you have questions about a medical condition or this instruction, always ask your healthcare professional. [address] any warranty or liability for your use of this information. Learning About Living [place]roy  What is a living will? A living will, also called a declaration, is a legal form. It tells your family and your doctor your wishes when you can't speak for yourself. It's used by the health professionals who will treat you as you near the end of your life or if you get seriously hurt or ill. If you put your wishes in writing, your loved ones and others will know what kind of care you want. They won't need to guess. This can ease your mind and be helpful to others. And you can change or cancel your living will at any time. A living will is not the same as an estate or property will. An estate will explains what you want to happen with your money and property after you die. How do you use it? A living will is used to describe the kinds of treatment or life support you want as you near the end of your life or if you get seriously hurt or ill. Keep these facts in mind about living de la garza. · Your living will is used only if you can't speak or make decisions for yourself. Most often, one or more doctors must certify that you can't speak or decide for yourself before your living will takes effect. · If you get better and can speak for yourself again, you can accept or refuse any treatment. It doesn't matter what you said in your living will. · Some states may limit your right to refuse treatment in certain cases. For example, you may need to clearly state in your living will that you don't want artificial hydration and nutrition, such as being fed through a tube. Is a living will a legal document? A living will is a legal document. Each state has its own laws about living de la garza.  And a living will may be called something else in your state. Here are some things to know about living de la garza. · You don't need an  to complete a living will. But legal advice can be helpful if your state's laws are unclear. It can also help if your health history is complicated or your family can't agree on what should be in your living will. · You can change your living will at any time. Some people find that their wishes about end-of-life care change as their health changes. If you make big changes to your living will, complete a new form. · If you move to another state, make sure that your living will is legal in the state where you now live. In most cases, doctors will respect your wishes even if you have a form from a different state. · You might use a universal form that has been approved by many states. This kind of form can sometimes be filled out and stored online. Your digital copy will then be available wherever you have a connection to the internet. The doctors and nurses who need to treat you can find it right away. · Your state may offer an online registry. This is another place where you can store your living will online. · It's a good idea to get your living will notarized. This means using a person called a  to watch two people sign, or witness, your living will. What should you know when you create a living will? Here are some questions to ask yourself as you make your living will:  · Do you know enough about life support methods that might be used? If not, talk to your doctor so you know what might be done if you can't breathe on your own, your heart stops, or you can't swallow. · What things would you still want to be able to do after you receive life-support methods? Would you want to be able to walk? To speak? To eat on your own? To live without the help of machines? · Do you want certain Jew practices performed if you become very ill?   · If you have a choice, where do you want to be cared for? In your home? At a hospital or nursing home? · If you have a choice at the end of your life, where would you prefer to die? At home? In a hospital or nursing home? Somewhere else? · Would you prefer to be buried or cremated? · Do you want your organs to be donated after you die? What should you do with your living will? · Make sure that your family members and your health care agent have copies of your living will (also called a declaration). · Give your doctor a copy of your living will. Ask him or her to keep it as part of your medical record. If you have more than one doctor, make sure that each one has a copy. · Put a copy of your living will where it can be easily found. For example, some people may put a copy on their refrigerator door. If you are using a digital copy, be sure your doctor, family members, and health care agent know how to find and access it. Where can you learn more? Go to https://ServiceMaxpeCeeLite Technologies.VULCUN. org and sign in to your Accedian Networks account. Enter V491 in the [facility] box to learn more about \"Learning About Living Day Thakur. \"     If you do not have an account, please click on the \"Sign Up Now\" link. Current as of: March 17, 2021               Content Version: 13.0  © 9817-5326 Healthwise, Incorporated. Care instructions adapted under license by [facility] ([facility]). If you have questions about a medical condition or this instruction, always ask your healthcare professional. [place] [address] any warranty or liability for your use of this information.

## 2021-12-16 NOTE — ASSESSMENT & PLAN NOTE
He is currently doing very well with counseling and with sertraline 25 mg daily. I will send a 90-day prescription of the sertraline.

## 2021-12-20 NOTE — FLOWSHEET NOTE
Outpatient Physical Therapy  [place]           [x] Phone: [phone]   Fax: [phone]  Tresa Lee           [] Phone: [phone]   Fax: [phone]        Physical Therapy Daily Treatment Note  Date:  2021    Patient Name:  Ty Orourke    :  1965  MRN: [id number]  Restrictions/Precautions:    Diagnosis:    Chronic R shoulder pain  Date of Injury/Surgery:   Treatment Diagnosis:  R shoulder weakness, decreased ROM of R shoulder, R shoulder pain    Insurance/Certification information: [place] [id number] UNITS FROM -21  Referring Physician:   Dr. Nyasia Carrion   Next Doctor Visit:    Plan of care signed (Y/N):  yes  Outcome Measure: Quick DASH: 29.54%  Visit# / total visits: 10/12  Pain level: 0/10 currently  Goals:         Short term goals  Time Frame for Short term goals: 6 visits  Short term goal 1: Pt will be independent with HEP to maximize rehab potential outside of clinic MET    Short term goal 2: Pt will improve R shoulder abd AROM to 110 degrees to aide in ability to perform ADLs MET    Long term goals  Time Frame for Long term goals : 12 visits  Long term goal 1: Pt will improve quick DASH score to a 33% or lower to show subjective patient improvement in function. MET    Long term goal 2: Pt will improve global RUE strength to at least a 4+/5 to aide in ability to perform ADLs and overhead activities Progressing    Long term goal 3: Pt will improve R shoulder abd AROM to 150 degrees or more to aide in ability to perform overhead activities and reaching tasks. MET      Summary of Evaluation: Assessment: Pt is a 65 yo male that presents to outpatient physical therapy with R shoulder pain, ROM deficits, and weakness. Positive Special Tests include Speeds test and Carrasco-Peewee. Tenderness to palpation was noted over the proximal biceps tendon and over the supraspinatus muscle belly. 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 Pt will benefit from skilled physical therapy to improve above listed impairments, aide in ADLs, and to progress toward goal completion. Subjective:  Pt reports of no shld pain over the weekend but is still being very careful of his activity. Any changes in Ambulatory Summary Sheet?   None      Objective:    COVID screening questions were asked and patient attested that there had been no contact or symptoms        MMT: R UE:   Shld flex:4+/5  shld abd: 4-/5 with pain  shld ext: 5/5  shld IR: 5/5  Shld ER: 4+/5  Elbow flex:4+/5  Elbow ext: 4+/5    Exercises: (No more than 4 columns)   Exercise/Equipment 12/9/21 #8 12/16/21 #9 12/20/21 #10           WARM UP         UBE 2'/2' 2'/2' 2'/2'         TABLE      Supine Cane assisted shld abd 2# bar x10, 5\" Standing 1# cane abd 10x Standing 1# cane abd 10x   Supine cane flex 2# bar x10, 5\" Seated 1# cane flex 10x Seated 1# cane flex 10x   Supine cane ER 2# bar x10, 5\" 2# bar x10, 5\" 1# bar x10, 5\"   Rhythmic stab      sidelying ER   2# 10x2   Prone rows      Prone Scaption 2# x10 2# x10 2# x10   Prone horz abd 2# x10 2# x10 2# x 10   ABCs      STANDING      corewheel  Flex and scap 10x ea Flex and scap 10x ea   Doorway Pec stretch      shld abd 1# in pain free ROM 1# in pain free ROM 10x 1# in pain free ROM 10x   theraband Row GTB x10 GTB x10 GTB x10   TB shoulder ext GTB x10 GTB x10 GTB x10   Low row - supinated       Supinated shoulder flex ecc emphasis      ER/IR with towel roll      Bicep curl       A-Z ball on wall      SA CKC on elevated table      PROPRIOCEPTION                                    MODALITIES      vaso 10' Pt requested STM to ant shld and along bicep today instead of vaso Pt requested STM to ant shld and along bicep today instead of vaso             Other Therapeutic Activities/Education: educated pt on HEP    Home Exercise Program:   Doorway Pec stretch  TB row  Supine Cane assisted AAROM shld abd  IR/ER with TB  Manual Treatments: STM to ant shld and along bicep , gentle post and inf mobs    Modalities:     Communication with other providers:  POC sent on 11/8/21     Assessment:  Pt demonstrated overall good tolerance to today's session w/o adverse reaction. Pt progressing towards goals. Pt will benefit from continued therapy to address remaining strength and mobility impairments, reduce pain, and improve overall function to return to PLOF and reduce the risk of future decline/reinjury.   End pain 0/10     Plan for Next Session: R shld ROM, R shoulder strengthening, UBE, Biceps eccentrics, scapular strengthening,    Time In / Time Out:  2504 - 1120    If [employer] Please Indicate Units for Rx this date and running total for each and overall total for Rx to date:  CPT Code Units today Running Total Units Total approved    TE 07362  2 20    MAN 13415  1 6    Gait 04218      NR 30913  1    TA  49138      Estim Unatt 72930        74638      [facility] [place] [address]      VASO 05178   7    ADL/Self care 34122      DNT 1-2 17753      DNT 3-4 02260      Other:     1           Total for episode of care   35 96        Timed Code/Total Treatment Minutes:  45/45, (2) TE, (1) MT  Next Progress Note due:  10th visit     Plan of Care Interventions:  [x] Therapeutic Exercise  [x] Modalities:  [x] Therapeutic Activity     [] Ultrasound  [] Estim  [] Gait Training      [] Cervical Traction [] Lumbar Traction  [x] Neuromuscular Re-education    [x] Cold/hotpack [] Iontophoresis   [x] Instruction in HEP      [x] Vasopneumatic   [] Dry Needling    [x] Manual Therapy               [] Aquatic Therapy              Electronically signed by:  Toyin Larsen PTA 12/20/2021, 10:39 AM

## 2021-12-27 NOTE — FLOWSHEET NOTE
Physical Therapy  Cancellation/No-show Note  Patient Name:  Agatha Calix  :  1965   Date:  2021  Cancelled visits to date: 2  No-shows to date: 0    For today's appointment patient:  [x]  Cancelled  []  Rescheduled appointment  []  No-show     Reason given by patient:  [x]  Patient ill  []  Conflicting appointment  []  No transportation    []  Conflict with work  []  No reason given  []  Other:     Comments:      Electronically signed by:  Rachel Garcia, PT, DPT

## 2021-12-30 NOTE — PATIENT INSTRUCTIONS
Patient Education        10 Things to Do When You Have COVID-19      Stay home. Don't go to school, work, or public areas. And don't use public transportation, ride-shares, or taxis unless you have no choice. Leave your home only if you need to get medical care. But call the doctor's office first so they know you're coming. And wear a mask. Ask before leaving isolation. Follow your doctor's advice about when it is safe for you to leave isolation. Wear a mask when you are around other people. It can help stop the spread of the virus. Limit contact with people in your home. If possible, stay in a separate bedroom and use a separate bathroom. Avoid contact with pets and other animals. If possible, have a friend or family member care for them while you're sick. Cover your mouth and nose with a tissue when you cough or sneeze. Then throw the tissue in the trash right away. Wash your hands often, especially after you cough or sneeze. Use soap and water, and scrub for at least 20 seconds. If soap and water aren't available, use an alcohol-based hand . Don't share personal household items. These include bedding, towels, cups and glasses, and eating utensils. Clean and disinfect your home every day. Use household  or disinfectant wipes or sprays. If needed, take acetaminophen (Tylenol) or ibuprofen (Advil, Motrin) to relieve fever and body aches. Read and follow all instructions on the label. Current as of: March 26, 2021               Content Version: 13.1  © 2006-2021 [employer]. Care instructions adapted under license by [facility] ([facility]). If you have questions about a medical condition or this instruction, always ask your healthcare professional. [place] [address] any warranty or liability for your use of this information.

## 2021-12-30 NOTE — PROGRESS NOTES
Sierra Ricketts (:  1965) is a 64 y.o. male,Established patient, here for evaluation of the following chief complaint(s): Other (covid infusion)         ASSESSMENT/PLAN:  1. COVID-19  2. Symptomatic localization-related epilepsy ([facility] [address]    I talked through the possibility of an infusion with monoclonal antibodies to treat Covid. He is about 5 days after the onset of symptoms and so I feel the benefit of the antibodies is becoming less now. He has benefited from ibuprofen and acetaminophen which she can continue. I discussed keeping hydrated. I discussed resting and avoiding stress. He opted not to seek the monoclonal antibody infusion since we feel the amount of benefit is questionable. I instructed him in being sure that he continues to keep hydration since the fever causes more evaporation of water from the body. No follow-ups on file. SUBJECTIVE/OBJECTIVE:  HPI  Covid dx on 21. He feels better and then gets chills and temp goes up. Lowest temp was 101 and it has been up to 103. Breathing is fine. He is very fatigued. Appetite is poor. Review of Systems   Constitutional: Positive for fever. Respiratory: Positive for cough. Negative for shortness of breath. Gastrointestinal: Negative for diarrhea and vomiting. Neurological: Positive for headaches.        Patient-Reported Vitals 10/26/2021   Patient-Reported Weight 185   Patient-Reported Height 511   Patient-Reported Systolic 768   Patient-Reported Diastolic 78   Patient-Reported Pulse 75   Patient-Reported Temperature 97        Physical Exam    [INSTRUCTIONS:  \"[x]\" Indicates a positive item  \"[]\" Indicates a negative item  -- DELETE ALL ITEMS NOT EXAMINED]    Constitutional: [x] Appears well-developed and well-nourished [x] No apparent distress      [] Abnormal -     Mental status: [x] Alert and awake  [x] Oriented to person/place/time [x] Able to follow commands    [] Abnormal -     Eyes:   EOM    [x]  Normal    [] Abnormal -   Sclera  [x]  Normal    [] Abnormal -          Discharge [x]  None visible   [] Abnormal -     HENT: [x] Normocephalic, atraumatic  [] Abnormal -   [x] Mouth/Throat: Mucous membranes are moist    External Ears [x] Normal  [] Abnormal -    Neck: [x] No visualized mass [] Abnormal -     Pulmonary/Chest: [x] Respiratory effort normal   [x] No visualized signs of difficulty breathing or respiratory distress        [] Abnormal -      Musculoskeletal:   [x] Normal gait with no signs of ataxia         [x] Normal range of motion of neck        [] Abnormal -     Neurological:        [x] No Facial Asymmetry (Cranial nerve 7 motor function) (limited exam due to video visit)          [x] No gaze palsy        [] Abnormal -          Skin:        [x] No significant exanthematous lesions or discoloration noted on facial skin         [] Abnormal -            Psychiatric:       [x] Normal Affect [] Abnormal -        [x] No Hallucinations    Other pertinent observable physical exam findings:-              Curt Odonnell, was evaluated through a synchronous (real-time) audio-video encounter. The patient (or guardian if applicable) is aware that this is a billable service. Verbal consent to proceed has been obtained within the past 12 months. The visit was conducted pursuant to the emergency declaration under the [address] [place] [address] and the [employer] Act. Patient identification was verified, and a caregiver was present when appropriate. The patient was located in a state where the provider was credentialed to provide care. An electronic signature was used to authenticate this note.     --Harpal Garrett MD

## 2022-01-01 ENCOUNTER — APPOINTMENT (OUTPATIENT)
Dept: GENERAL RADIOLOGY | Age: 57
DRG: 720 | End: 2022-01-01
Payer: COMMERCIAL

## 2022-01-01 ENCOUNTER — ANESTHESIA EVENT (OUTPATIENT)
Dept: CARDIOVASCULAR ICU | Age: 57
DRG: 720 | End: 2022-01-01
Payer: COMMERCIAL

## 2022-01-01 ENCOUNTER — HOSPITAL ENCOUNTER (EMERGENCY)
Age: 57
Discharge: HOME | DRG: 720 | End: 2022-01-06
Attending: EMERGENCY MEDICINE
Payer: COMMERCIAL

## 2022-01-01 ENCOUNTER — APPOINTMENT (OUTPATIENT)
Dept: CT IMAGING | Age: 57
DRG: 720 | End: 2022-01-01
Payer: COMMERCIAL

## 2022-01-01 ENCOUNTER — ANESTHESIA EVENT (OUTPATIENT)
Dept: MEDSURG UNIT | Age: 57
DRG: 720 | End: 2022-01-01
Payer: COMMERCIAL

## 2022-01-01 ENCOUNTER — ANESTHESIA (OUTPATIENT)
Dept: CARDIOVASCULAR ICU | Age: 57
DRG: 720 | End: 2022-01-01
Payer: COMMERCIAL

## 2022-01-01 ENCOUNTER — NURSE TRIAGE (OUTPATIENT)
Dept: OTHER | Facility: CLINIC | Age: 57
End: 2022-01-01

## 2022-01-01 ENCOUNTER — TELEPHONE (OUTPATIENT)
Dept: GASTROENTEROLOGY | Age: 57
End: 2022-01-01

## 2022-01-01 ENCOUNTER — TELEPHONE (OUTPATIENT)
Dept: FAMILY MEDICINE CLINIC | Age: 57
End: 2022-01-01

## 2022-01-01 ENCOUNTER — HOSPITAL ENCOUNTER (INPATIENT)
Age: 57
LOS: 26 days | DRG: 720 | End: 2022-02-02
Attending: EMERGENCY MEDICINE | Admitting: INTERNAL MEDICINE
Payer: COMMERCIAL

## 2022-01-01 ENCOUNTER — ANESTHESIA (OUTPATIENT)
Dept: MEDSURG UNIT | Age: 57
DRG: 720 | End: 2022-01-01
Payer: COMMERCIAL

## 2022-01-01 VITALS
OXYGEN SATURATION: 90 % | WEIGHT: 221.34 LBS | SYSTOLIC BLOOD PRESSURE: 129 MMHG | DIASTOLIC BLOOD PRESSURE: 79 MMHG | HEIGHT: 72 IN | BODY MASS INDEX: 29.98 KG/M2 | HEART RATE: 88 BPM | TEMPERATURE: 98.6 F

## 2022-01-01 VITALS
OXYGEN SATURATION: 96 % | RESPIRATION RATE: 18 BRPM | SYSTOLIC BLOOD PRESSURE: 128 MMHG | HEIGHT: 72 IN | BODY MASS INDEX: 25.06 KG/M2 | HEART RATE: 76 BPM | TEMPERATURE: 98.4 F | DIASTOLIC BLOOD PRESSURE: 83 MMHG | WEIGHT: 185 LBS

## 2022-01-01 DIAGNOSIS — J12.82 PNEUMONIA DUE TO COVID-19 VIRUS: ICD-10-CM

## 2022-01-01 DIAGNOSIS — J12.82 PNEUMONIA DUE TO COVID-19 VIRUS: Primary | ICD-10-CM

## 2022-01-01 DIAGNOSIS — R74.01 TRANSAMINITIS: ICD-10-CM

## 2022-01-01 DIAGNOSIS — U07.1 PNEUMONIA DUE TO COVID-19 VIRUS: Primary | ICD-10-CM

## 2022-01-01 DIAGNOSIS — R09.02 HYPOXIA: ICD-10-CM

## 2022-01-01 DIAGNOSIS — U07.1 PNEUMONIA DUE TO COVID-19 VIRUS: ICD-10-CM

## 2022-01-01 DIAGNOSIS — R53.83 OTHER FATIGUE: Primary | ICD-10-CM

## 2022-01-01 LAB
1,3 BETA-D-GLUCAN INTERP: POSITIVE
1,3 BETA-D-GLUCAN: 80 PG/ML
ADENOVIRUS DETECTION BY PCR: NOT DETECTED
ALBUMIN SERPL-MCNC: 1.8 GM/DL (ref 3.4–5)
ALBUMIN SERPL-MCNC: 1.8 GM/DL (ref 3.4–5)
ALBUMIN SERPL-MCNC: 2 GM/DL (ref 3.4–5)
ALBUMIN SERPL-MCNC: 2.2 GM/DL (ref 3.4–5)
ALBUMIN SERPL-MCNC: 2.4 GM/DL (ref 3.4–5)
ALBUMIN SERPL-MCNC: 2.4 GM/DL (ref 3.4–5)
ALBUMIN SERPL-MCNC: 2.6 GM/DL (ref 3.4–5)
ALBUMIN SERPL-MCNC: 2.8 GM/DL (ref 3.4–5)
ALBUMIN SERPL-MCNC: 3.1 GM/DL (ref 3.4–5)
ALBUMIN SERPL-MCNC: 3.4 GM/DL (ref 3.4–5)
ALP BLD-CCNC: 178 IU/L (ref 40–128)
ALP BLD-CCNC: 184 IU/L (ref 40–129)
ALP BLD-CCNC: 196 IU/L (ref 40–129)
ALP BLD-CCNC: 198 IU/L (ref 40–128)
ALP BLD-CCNC: 202 IU/L (ref 40–129)
ALP BLD-CCNC: 244 IU/L (ref 40–128)
ALP BLD-CCNC: 280 IU/L (ref 40–128)
ALP BLD-CCNC: 292 IU/L (ref 40–128)
ALP BLD-CCNC: 294 IU/L (ref 40–128)
ALP BLD-CCNC: 302 IU/L (ref 40–128)
ALP BLD-CCNC: 313 IU/L (ref 40–128)
ALP BLD-CCNC: 324 IU/L (ref 40–128)
ALT SERPL-CCNC: 104 U/L (ref 10–40)
ALT SERPL-CCNC: 116 U/L (ref 10–40)
ALT SERPL-CCNC: 116 U/L (ref 10–40)
ALT SERPL-CCNC: 121 U/L (ref 10–40)
ALT SERPL-CCNC: 131 U/L (ref 10–40)
ALT SERPL-CCNC: 148 U/L (ref 10–40)
ALT SERPL-CCNC: 154 U/L (ref 10–40)
ALT SERPL-CCNC: 52 U/L (ref 10–40)
ALT SERPL-CCNC: 68 U/L (ref 10–40)
ALT SERPL-CCNC: 71 U/L (ref 10–40)
ALT SERPL-CCNC: 89 U/L (ref 10–40)
ALT SERPL-CCNC: 90 U/L (ref 10–40)
ANION GAP SERPL CALCULATED.3IONS-SCNC: 10 MMOL/L (ref 4–16)
ANION GAP SERPL CALCULATED.3IONS-SCNC: 11 MMOL/L (ref 4–16)
ANION GAP SERPL CALCULATED.3IONS-SCNC: 12 MMOL/L (ref 4–16)
ANION GAP SERPL CALCULATED.3IONS-SCNC: 13 MMOL/L (ref 4–16)
ANION GAP SERPL CALCULATED.3IONS-SCNC: 16 MMOL/L (ref 4–16)
ANION GAP SERPL CALCULATED.3IONS-SCNC: 6 MMOL/L (ref 4–16)
ANION GAP SERPL CALCULATED.3IONS-SCNC: 7 MMOL/L (ref 4–16)
ANION GAP SERPL CALCULATED.3IONS-SCNC: 8 MMOL/L (ref 4–16)
ANION GAP SERPL CALCULATED.3IONS-SCNC: 9 MMOL/L (ref 4–16)
ANION GAP SERPL CALCULATED.3IONS-SCNC: 9 MMOL/L (ref 4–16)
ANISOCYTOSIS: ABNORMAL
ASPERGILLUS GALACTO AG: NEGATIVE
ASPERGILLUS GALACTO INDEX: 0.05
AST SERPL-CCNC: 100 IU/L (ref 15–37)
AST SERPL-CCNC: 105 IU/L (ref 15–37)
AST SERPL-CCNC: 106 IU/L (ref 15–37)
AST SERPL-CCNC: 111 IU/L (ref 15–37)
AST SERPL-CCNC: 124 IU/L (ref 15–37)
AST SERPL-CCNC: 182 IU/L (ref 15–37)
AST SERPL-CCNC: 184 IU/L (ref 15–37)
AST SERPL-CCNC: 197 IU/L (ref 15–37)
AST SERPL-CCNC: 63 IU/L (ref 15–37)
AST SERPL-CCNC: 74 IU/L (ref 15–37)
AST SERPL-CCNC: 76 IU/L (ref 15–37)
AST SERPL-CCNC: <5 IU/L (ref 15–37)
BACTERIA: NEGATIVE /HPF
BANDED NEUTROPHILS ABSOLUTE COUNT: 0.41 K/CU MM
BANDED NEUTROPHILS ABSOLUTE COUNT: 0.45 K/CU MM
BANDED NEUTROPHILS ABSOLUTE COUNT: 0.57 K/CU MM
BANDED NEUTROPHILS ABSOLUTE COUNT: 1.09 K/CU MM
BANDED NEUTROPHILS ABSOLUTE COUNT: 1.16 K/CU MM
BANDED NEUTROPHILS ABSOLUTE COUNT: 1.35 K/CU MM
BANDED NEUTROPHILS ABSOLUTE COUNT: 1.37 K/CU MM
BANDED NEUTROPHILS ABSOLUTE COUNT: 1.39 K/CU MM
BANDED NEUTROPHILS ABSOLUTE COUNT: 1.49 K/CU MM
BANDED NEUTROPHILS ABSOLUTE COUNT: 1.86 K/CU MM
BANDED NEUTROPHILS ABSOLUTE COUNT: 1.88 K/CU MM
BANDED NEUTROPHILS ABSOLUTE COUNT: 1.94 K/CU MM
BANDED NEUTROPHILS ABSOLUTE COUNT: 1.94 K/CU MM
BANDED NEUTROPHILS ABSOLUTE COUNT: 2.02 K/CU MM
BANDED NEUTROPHILS ABSOLUTE COUNT: 2.17 K/CU MM
BANDED NEUTROPHILS ABSOLUTE COUNT: 2.29 K/CU MM
BANDED NEUTROPHILS ABSOLUTE COUNT: 2.39 K/CU MM
BANDED NEUTROPHILS ABSOLUTE COUNT: 2.43 K/CU MM
BANDED NEUTROPHILS RELATIVE PERCENT: 11 % (ref 5–11)
BANDED NEUTROPHILS RELATIVE PERCENT: 12 % (ref 5–11)
BANDED NEUTROPHILS RELATIVE PERCENT: 13 % (ref 5–11)
BANDED NEUTROPHILS RELATIVE PERCENT: 14 % (ref 5–11)
BANDED NEUTROPHILS RELATIVE PERCENT: 14 % (ref 5–11)
BANDED NEUTROPHILS RELATIVE PERCENT: 2 % (ref 5–11)
BANDED NEUTROPHILS RELATIVE PERCENT: 2 % (ref 5–11)
BANDED NEUTROPHILS RELATIVE PERCENT: 3 % (ref 5–11)
BANDED NEUTROPHILS RELATIVE PERCENT: 5 % (ref 5–11)
BANDED NEUTROPHILS RELATIVE PERCENT: 5 % (ref 5–11)
BANDED NEUTROPHILS RELATIVE PERCENT: 6 % (ref 5–11)
BANDED NEUTROPHILS RELATIVE PERCENT: 7 % (ref 5–11)
BANDED NEUTROPHILS RELATIVE PERCENT: 8 % (ref 5–11)
BANDED NEUTROPHILS RELATIVE PERCENT: 9 % (ref 5–11)
BASE EXCESS MIXED: 0.1 (ref 0–1.2)
BASE EXCESS: 0 (ref 0–3.3)
BASE EXCESS: 0 (ref 0–3.3)
BASE EXCESS: 1 (ref 0–3.3)
BASE EXCESS: 1 (ref 0–3.3)
BASE EXCESS: 2 (ref 0–3.3)
BASE EXCESS: 3 (ref 0–3.3)
BASE EXCESS: 4 (ref 0–3.3)
BASE EXCESS: 5 (ref 0–3.3)
BASE EXCESS: 6 (ref 0–3.3)
BASE EXCESS: 7 (ref 0–3.3)
BASOPHILIC STIPPLING: PRESENT
BASOPHILIC STIPPLING: PRESENT
BASOPHILS ABSOLUTE: 0 K/CU MM
BASOPHILS RELATIVE PERCENT: 0.1 % (ref 0–1)
BASOPHILS RELATIVE PERCENT: 0.2 % (ref 0–1)
BILIRUB SERPL-MCNC: 0.6 MG/DL (ref 0–1)
BILIRUB SERPL-MCNC: 0.8 MG/DL (ref 0–1)
BILIRUB SERPL-MCNC: 0.9 MG/DL (ref 0–1)
BILIRUB SERPL-MCNC: 1 MG/DL (ref 0–1)
BILIRUB SERPL-MCNC: 1 MG/DL (ref 0–1)
BILIRUB SERPL-MCNC: 1.1 MG/DL (ref 0–1)
BILIRUB SERPL-MCNC: 1.2 MG/DL (ref 0–1)
BILIRUB SERPL-MCNC: 1.3 MG/DL (ref 0–1)
BILIRUBIN URINE: NEGATIVE MG/DL
BLOOD, URINE: NEGATIVE
BORDETELLA PARAPERTUSSIS BY PCR: NOT DETECTED
BORDETELLA PERTUSSIS PCR: NOT DETECTED
BUN BLDV-MCNC: 20 MG/DL (ref 6–23)
BUN BLDV-MCNC: 24 MG/DL (ref 6–23)
BUN BLDV-MCNC: 28 MG/DL (ref 6–23)
BUN BLDV-MCNC: 34 MG/DL (ref 6–23)
BUN BLDV-MCNC: 34 MG/DL (ref 6–23)
BUN BLDV-MCNC: 36 MG/DL (ref 6–23)
BUN BLDV-MCNC: 37 MG/DL (ref 6–23)
BUN BLDV-MCNC: 37 MG/DL (ref 6–23)
BUN BLDV-MCNC: 38 MG/DL (ref 6–23)
BUN BLDV-MCNC: 38 MG/DL (ref 6–23)
BUN BLDV-MCNC: 39 MG/DL (ref 6–23)
BUN BLDV-MCNC: 40 MG/DL (ref 6–23)
BUN BLDV-MCNC: 40 MG/DL (ref 6–23)
BUN BLDV-MCNC: 41 MG/DL (ref 6–23)
BUN BLDV-MCNC: 41 MG/DL (ref 6–23)
BUN BLDV-MCNC: 43 MG/DL (ref 6–23)
BUN BLDV-MCNC: 45 MG/DL (ref 6–23)
BUN BLDV-MCNC: 50 MG/DL (ref 6–23)
BUN BLDV-MCNC: 52 MG/DL (ref 6–23)
BUN BLDV-MCNC: 53 MG/DL (ref 6–23)
BUN BLDV-MCNC: 57 MG/DL (ref 6–23)
BUN BLDV-MCNC: 57 MG/DL (ref 6–23)
BUN BLDV-MCNC: 59 MG/DL (ref 6–23)
BUN BLDV-MCNC: 59 MG/DL (ref 6–23)
BUN BLDV-MCNC: 60 MG/DL (ref 6–23)
BUN BLDV-MCNC: 60 MG/DL (ref 6–23)
CALCIUM IONIZED: 4.56 MG/DL (ref 4.48–5.28)
CALCIUM IONIZED: 4.68 MG/DL (ref 4.48–5.28)
CALCIUM SERPL-MCNC: 6.9 MG/DL (ref 8.3–10.6)
CALCIUM SERPL-MCNC: 7.2 MG/DL (ref 8.3–10.6)
CALCIUM SERPL-MCNC: 7.4 MG/DL (ref 8.3–10.6)
CALCIUM SERPL-MCNC: 7.4 MG/DL (ref 8.3–10.6)
CALCIUM SERPL-MCNC: 7.5 MG/DL (ref 8.3–10.6)
CALCIUM SERPL-MCNC: 7.6 MG/DL (ref 8.3–10.6)
CALCIUM SERPL-MCNC: 7.7 MG/DL (ref 8.3–10.6)
CALCIUM SERPL-MCNC: 7.9 MG/DL (ref 8.3–10.6)
CALCIUM SERPL-MCNC: 8.1 MG/DL (ref 8.3–10.6)
CALCIUM SERPL-MCNC: 8.2 MG/DL (ref 8.3–10.6)
CALCIUM SERPL-MCNC: 8.3 MG/DL (ref 8.3–10.6)
CALCIUM SERPL-MCNC: 8.3 MG/DL (ref 8.3–10.6)
CALCIUM SERPL-MCNC: 8.4 MG/DL (ref 8.3–10.6)
CARBON MONOXIDE, BLOOD: 1.2 % (ref 0–5)
CARBON MONOXIDE, BLOOD: 1.3 % (ref 0–5)
CARBON MONOXIDE, BLOOD: 1.6 % (ref 0–5)
CARBON MONOXIDE, BLOOD: 1.7 % (ref 0–5)
CARBON MONOXIDE, BLOOD: 1.8 % (ref 0–5)
CARBON MONOXIDE, BLOOD: 1.9 % (ref 0–5)
CARBON MONOXIDE, BLOOD: 1.9 % (ref 0–5)
CARBON MONOXIDE, BLOOD: 2.1 % (ref 0–5)
CARBON MONOXIDE, BLOOD: 2.1 % (ref 0–5)
CARBON MONOXIDE, BLOOD: 2.2 % (ref 0–5)
CARBON MONOXIDE, BLOOD: 2.2 % (ref 0–5)
CARBON MONOXIDE, BLOOD: 2.3 % (ref 0–5)
CARBON MONOXIDE, BLOOD: 2.3 % (ref 0–5)
CARBON MONOXIDE, BLOOD: 2.4 % (ref 0–5)
CARBON MONOXIDE, BLOOD: 2.4 % (ref 0–5)
CARBON MONOXIDE, BLOOD: 2.5 % (ref 0–5)
CARBON MONOXIDE, BLOOD: 2.5 % (ref 0–5)
CARBON MONOXIDE, BLOOD: 2.7 % (ref 0–5)
CARBON MONOXIDE, BLOOD: 2.7 % (ref 0–5)
CARBON MONOXIDE, BLOOD: 3 % (ref 0–5)
CARBON MONOXIDE, BLOOD: 3.2 % (ref 0–5)
CHLAMYDOPHILA PNEUMONIA PCR: NOT DETECTED
CHLORIDE BLD-SCNC: 106 MMOL/L (ref 99–110)
CHLORIDE BLD-SCNC: 107 MMOL/L (ref 99–110)
CHLORIDE BLD-SCNC: 108 MMOL/L (ref 99–110)
CHLORIDE BLD-SCNC: 108 MMOL/L (ref 99–110)
CHLORIDE BLD-SCNC: 109 MMOL/L (ref 99–110)
CHLORIDE BLD-SCNC: 110 MMOL/L (ref 99–110)
CHLORIDE BLD-SCNC: 111 MMOL/L (ref 99–110)
CHLORIDE BLD-SCNC: 111 MMOL/L (ref 99–110)
CHLORIDE BLD-SCNC: 112 MMOL/L (ref 99–110)
CHLORIDE BLD-SCNC: 114 MMOL/L (ref 99–110)
CHLORIDE BLD-SCNC: 116 MMOL/L (ref 99–110)
CHLORIDE BLD-SCNC: 117 MMOL/L (ref 99–110)
CHLORIDE BLD-SCNC: 117 MMOL/L (ref 99–110)
CHLORIDE BLD-SCNC: 118 MMOL/L (ref 99–110)
CHLORIDE BLD-SCNC: 119 MMOL/L (ref 99–110)
CHLORIDE BLD-SCNC: 120 MMOL/L (ref 99–110)
CLARITY: CLEAR
CO2 CONTENT: 20.7 MMOL/L (ref 19–24)
CO2 CONTENT: 21 MMOL/L (ref 19–24)
CO2 CONTENT: 21 MMOL/L (ref 19–24)
CO2 CONTENT: 21.3 MMOL/L (ref 19–24)
CO2 CONTENT: 21.7 MMOL/L (ref 19–24)
CO2 CONTENT: 22 MMOL/L (ref 19–24)
CO2 CONTENT: 22 MMOL/L (ref 19–24)
CO2 CONTENT: 22.5 MMOL/L (ref 19–24)
CO2 CONTENT: 22.6 MMOL/L (ref 19–24)
CO2 CONTENT: 22.7 MMOL/L (ref 19–24)
CO2 CONTENT: 22.9 MMOL/L (ref 19–24)
CO2 CONTENT: 23.2 MMOL/L (ref 19–24)
CO2 CONTENT: 23.4 MMOL/L (ref 19–24)
CO2 CONTENT: 23.4 MMOL/L (ref 19–24)
CO2 CONTENT: 23.5 MMOL/L (ref 19–24)
CO2 CONTENT: 23.6 MMOL/L (ref 19–24)
CO2 CONTENT: 24.3 MMOL/L (ref 19–24)
CO2 CONTENT: 24.3 MMOL/L (ref 19–24)
CO2 CONTENT: 24.6 MMOL/L (ref 19–24)
CO2 CONTENT: 24.9 MMOL/L (ref 19–24)
CO2 CONTENT: 25 MMOL/L (ref 19–24)
CO2 CONTENT: 25.1 MMOL/L (ref 19–24)
CO2 CONTENT: 25.7 MMOL/L (ref 19–24)
CO2 CONTENT: 25.8 MMOL/L (ref 19–24)
CO2 CONTENT: 26.1 MMOL/L (ref 19–24)
CO2 CONTENT: 26.1 MMOL/L (ref 19–24)
CO2 CONTENT: 27.8 MMOL/L (ref 19–24)
CO2 CONTENT: 30.7 MMOL/L (ref 19–24)
CO2: 16 MMOL/L (ref 21–32)
CO2: 17 MMOL/L (ref 21–32)
CO2: 19 MMOL/L (ref 21–32)
CO2: 20 MMOL/L (ref 21–32)
CO2: 20 MMOL/L (ref 21–32)
CO2: 21 MMOL/L (ref 21–32)
CO2: 22 MMOL/L (ref 21–32)
CO2: 23 MMOL/L (ref 21–32)
CO2: 24 MMOL/L (ref 21–32)
CO2: 25 MMOL/L (ref 21–32)
CO2: 27 MMOL/L (ref 21–32)
COLOR: YELLOW
COMMENT: ABNORMAL
CORONAVIRUS 229E PCR: NOT DETECTED
CORONAVIRUS HKU1 PCR: NOT DETECTED
CORONAVIRUS NL63 PCR: NOT DETECTED
CORONAVIRUS OC43 PCR: NOT DETECTED
CREAT SERPL-MCNC: 0.6 MG/DL (ref 0.9–1.3)
CREAT SERPL-MCNC: 0.7 MG/DL (ref 0.9–1.3)
CREAT SERPL-MCNC: 0.7 MG/DL (ref 0.9–1.3)
CREAT SERPL-MCNC: 0.8 MG/DL (ref 0.9–1.3)
CREAT SERPL-MCNC: 0.9 MG/DL (ref 0.9–1.3)
CREAT SERPL-MCNC: 1 MG/DL (ref 0.9–1.3)
CREAT SERPL-MCNC: 1.1 MG/DL (ref 0.9–1.3)
CREAT SERPL-MCNC: 1.2 MG/DL (ref 0.9–1.3)
CREAT SERPL-MCNC: 1.3 MG/DL (ref 0.9–1.3)
CREAT SERPL-MCNC: 1.4 MG/DL (ref 0.9–1.3)
CULTURE: ABNORMAL
CULTURE: NORMAL
D DIMER: 1010 NG/ML(DDU)
D DIMER: 1350 NG/ML(DDU)
D DIMER: 1821 NG/ML(DDU)
D DIMER: 2002 NG/ML(DDU)
D DIMER: 2355 NG/ML(DDU)
D DIMER: 2879 NG/ML(DDU)
D DIMER: 2945 NG/ML(DDU)
D DIMER: 3327 NG/ML(DDU)
D DIMER: 3777 NG/ML(DDU)
D DIMER: 698 NG/ML(DDU)
D DIMER: 717 NG/ML(DDU)
D DIMER: 821 NG/ML(DDU)
D DIMER: >5250 NG/ML(DDU)
DIFFERENTIAL TYPE: ABNORMAL
DOHLE BODIES: PRESENT
DOHLE BODIES: PRESENT
DOSE AMOUNT: NORMAL
DOSE TIME: NORMAL
EKG ATRIAL RATE: 125 BPM
EKG ATRIAL RATE: 73 BPM
EKG DIAGNOSIS: NORMAL
EKG DIAGNOSIS: NORMAL
EKG P AXIS: 34 DEGREES
EKG P-R INTERVAL: 160 MS
EKG Q-T INTERVAL: 290 MS
EKG Q-T INTERVAL: 390 MS
EKG QRS DURATION: 74 MS
EKG QRS DURATION: 74 MS
EKG QTC CALCULATION (BAZETT): 429 MS
EKG QTC CALCULATION (BAZETT): 441 MS
EKG R AXIS: 16 DEGREES
EKG R AXIS: 43 DEGREES
EKG T AXIS: 12 DEGREES
EKG T AXIS: 3 DEGREES
EKG VENTRICULAR RATE: 139 BPM
EKG VENTRICULAR RATE: 73 BPM
EOSINOPHILS ABSOLUTE: 0 K/CU MM
EOSINOPHILS ABSOLUTE: 0.1 K/CU MM
EOSINOPHILS ABSOLUTE: 0.1 K/CU MM
EOSINOPHILS ABSOLUTE: 0.2 K/CU MM
EOSINOPHILS RELATIVE PERCENT: 0.1 % (ref 0–3)
EOSINOPHILS RELATIVE PERCENT: 0.1 % (ref 0–3)
EOSINOPHILS RELATIVE PERCENT: 0.4 % (ref 0–3)
EOSINOPHILS RELATIVE PERCENT: 0.8 % (ref 0–3)
EOSINOPHILS RELATIVE PERCENT: 1 % (ref 0–3)
FERRITIN: 953 NG/ML (ref 30–400)
GFR AFRICAN AMERICAN: >60 ML/MIN/1.73M2
GFR NON-AFRICAN AMERICAN: 52 ML/MIN/1.73M2
GFR NON-AFRICAN AMERICAN: 57 ML/MIN/1.73M2
GFR NON-AFRICAN AMERICAN: >60 ML/MIN/1.73M2
GIANT PLATELETS: PRESENT
GLUCOSE BLD-MCNC: 103 MG/DL (ref 70–99)
GLUCOSE BLD-MCNC: 105 MG/DL (ref 70–99)
GLUCOSE BLD-MCNC: 107 MG/DL (ref 70–99)
GLUCOSE BLD-MCNC: 109 MG/DL (ref 70–99)
GLUCOSE BLD-MCNC: 109 MG/DL (ref 70–99)
GLUCOSE BLD-MCNC: 112 MG/DL (ref 70–99)
GLUCOSE BLD-MCNC: 112 MG/DL (ref 70–99)
GLUCOSE BLD-MCNC: 120 MG/DL (ref 70–99)
GLUCOSE BLD-MCNC: 122 MG/DL (ref 70–99)
GLUCOSE BLD-MCNC: 124 MG/DL (ref 70–99)
GLUCOSE BLD-MCNC: 124 MG/DL (ref 70–99)
GLUCOSE BLD-MCNC: 125 MG/DL (ref 70–99)
GLUCOSE BLD-MCNC: 127 MG/DL (ref 70–99)
GLUCOSE BLD-MCNC: 127 MG/DL (ref 70–99)
GLUCOSE BLD-MCNC: 128 MG/DL (ref 70–99)
GLUCOSE BLD-MCNC: 129 MG/DL (ref 70–99)
GLUCOSE BLD-MCNC: 131 MG/DL (ref 70–99)
GLUCOSE BLD-MCNC: 131 MG/DL (ref 70–99)
GLUCOSE BLD-MCNC: 132 MG/DL (ref 70–99)
GLUCOSE BLD-MCNC: 134 MG/DL (ref 70–99)
GLUCOSE BLD-MCNC: 135 MG/DL (ref 70–99)
GLUCOSE BLD-MCNC: 136 MG/DL (ref 70–99)
GLUCOSE BLD-MCNC: 137 MG/DL (ref 70–99)
GLUCOSE BLD-MCNC: 137 MG/DL (ref 70–99)
GLUCOSE BLD-MCNC: 138 MG/DL (ref 70–99)
GLUCOSE BLD-MCNC: 138 MG/DL (ref 70–99)
GLUCOSE BLD-MCNC: 141 MG/DL (ref 70–99)
GLUCOSE BLD-MCNC: 144 MG/DL (ref 70–99)
GLUCOSE BLD-MCNC: 146 MG/DL (ref 70–99)
GLUCOSE BLD-MCNC: 146 MG/DL (ref 70–99)
GLUCOSE BLD-MCNC: 147 MG/DL (ref 70–99)
GLUCOSE BLD-MCNC: 147 MG/DL (ref 70–99)
GLUCOSE BLD-MCNC: 148 MG/DL (ref 70–99)
GLUCOSE BLD-MCNC: 149 MG/DL (ref 70–99)
GLUCOSE BLD-MCNC: 151 MG/DL (ref 70–99)
GLUCOSE BLD-MCNC: 153 MG/DL (ref 70–99)
GLUCOSE BLD-MCNC: 155 MG/DL (ref 70–99)
GLUCOSE BLD-MCNC: 158 MG/DL (ref 70–99)
GLUCOSE BLD-MCNC: 158 MG/DL (ref 70–99)
GLUCOSE BLD-MCNC: 160 MG/DL (ref 70–99)
GLUCOSE BLD-MCNC: 164 MG/DL (ref 70–99)
GLUCOSE BLD-MCNC: 165 MG/DL (ref 70–99)
GLUCOSE BLD-MCNC: 170 MG/DL (ref 70–99)
GLUCOSE BLD-MCNC: 171 MG/DL (ref 70–99)
GLUCOSE BLD-MCNC: 175 MG/DL (ref 70–99)
GLUCOSE BLD-MCNC: 180 MG/DL (ref 70–99)
GLUCOSE BLD-MCNC: 199 MG/DL (ref 70–99)
GLUCOSE BLD-MCNC: 205 MG/DL (ref 70–99)
GLUCOSE BLD-MCNC: 96 MG/DL (ref 70–99)
GLUCOSE BLD-MCNC: 97 MG/DL (ref 70–99)
GLUCOSE BLD-MCNC: 99 MG/DL (ref 70–99)
GLUCOSE BLD-MCNC: 99 MG/DL (ref 70–99)
GLUCOSE, URINE: NEGATIVE MG/DL
GRAM SMEAR: ABNORMAL
HCO3 ARTERIAL: 19.7 MMOL/L (ref 18–23)
HCO3 ARTERIAL: 19.7 MMOL/L (ref 18–23)
HCO3 ARTERIAL: 20 MMOL/L (ref 18–23)
HCO3 ARTERIAL: 20.3 MMOL/L (ref 18–23)
HCO3 ARTERIAL: 20.5 MMOL/L (ref 18–23)
HCO3 ARTERIAL: 20.7 MMOL/L (ref 18–23)
HCO3 ARTERIAL: 21.1 MMOL/L (ref 18–23)
HCO3 ARTERIAL: 21.2 MMOL/L (ref 18–23)
HCO3 ARTERIAL: 21.5 MMOL/L (ref 18–23)
HCO3 ARTERIAL: 21.6 MMOL/L (ref 18–23)
HCO3 ARTERIAL: 21.8 MMOL/L (ref 18–23)
HCO3 ARTERIAL: 22 MMOL/L (ref 18–23)
HCO3 ARTERIAL: 22.3 MMOL/L (ref 18–23)
HCO3 ARTERIAL: 22.3 MMOL/L (ref 18–23)
HCO3 ARTERIAL: 22.4 MMOL/L (ref 18–23)
HCO3 ARTERIAL: 22.6 MMOL/L (ref 18–23)
HCO3 ARTERIAL: 23.1 MMOL/L (ref 18–23)
HCO3 ARTERIAL: 23.1 MMOL/L (ref 18–23)
HCO3 ARTERIAL: 23.2 MMOL/L (ref 18–23)
HCO3 ARTERIAL: 23.3 MMOL/L (ref 18–23)
HCO3 ARTERIAL: 23.7 MMOL/L (ref 18–23)
HCO3 ARTERIAL: 23.7 MMOL/L (ref 18–23)
HCO3 ARTERIAL: 24 MMOL/L (ref 18–23)
HCO3 ARTERIAL: 24.3 MMOL/L (ref 18–23)
HCO3 ARTERIAL: 24.4 MMOL/L (ref 18–23)
HCO3 ARTERIAL: 24.8 MMOL/L (ref 18–23)
HCO3 ARTERIAL: 26.2 MMOL/L (ref 18–23)
HCO3 ARTERIAL: 28.7 MMOL/L (ref 18–23)
HCO3 VENOUS: 19 MMOL/L (ref 19–25)
HCT VFR BLD CALC: 24.3 % (ref 42–52)
HCT VFR BLD CALC: 27 % (ref 42–52)
HCT VFR BLD CALC: 27 % (ref 42–52)
HCT VFR BLD CALC: 27.5 % (ref 42–52)
HCT VFR BLD CALC: 27.8 % (ref 42–52)
HCT VFR BLD CALC: 27.9 % (ref 42–52)
HCT VFR BLD CALC: 29.4 % (ref 42–52)
HCT VFR BLD CALC: 29.4 % (ref 42–52)
HCT VFR BLD CALC: 29.5 % (ref 42–52)
HCT VFR BLD CALC: 30.2 % (ref 42–52)
HCT VFR BLD CALC: 30.2 % (ref 42–52)
HCT VFR BLD CALC: 30.6 % (ref 42–52)
HCT VFR BLD CALC: 31.1 % (ref 42–52)
HCT VFR BLD CALC: 31.2 % (ref 42–52)
HCT VFR BLD CALC: 31.3 % (ref 42–52)
HCT VFR BLD CALC: 31.5 % (ref 42–52)
HCT VFR BLD CALC: 31.6 % (ref 42–52)
HCT VFR BLD CALC: 32.3 % (ref 42–52)
HCT VFR BLD CALC: 34.6 % (ref 42–52)
HCT VFR BLD CALC: 34.8 % (ref 42–52)
HCT VFR BLD CALC: 35 % (ref 42–52)
HCT VFR BLD CALC: 38.6 % (ref 42–52)
HCT VFR BLD CALC: 40.3 % (ref 42–52)
HCT VFR BLD CALC: 40.7 % (ref 42–52)
HCT VFR BLD CALC: 44 % (ref 42–52)
HEMOGLOBIN: 10 GM/DL (ref 13.5–18)
HEMOGLOBIN: 10 GM/DL (ref 13.5–18)
HEMOGLOBIN: 10.2 GM/DL (ref 13.5–18)
HEMOGLOBIN: 10.5 GM/DL (ref 13.5–18)
HEMOGLOBIN: 11.1 GM/DL (ref 13.5–18)
HEMOGLOBIN: 11.9 GM/DL (ref 13.5–18)
HEMOGLOBIN: 12.4 GM/DL (ref 13.5–18)
HEMOGLOBIN: 12.8 GM/DL (ref 13.5–18)
HEMOGLOBIN: 12.8 GM/DL (ref 13.5–18)
HEMOGLOBIN: 13.6 GM/DL (ref 13.5–18)
HEMOGLOBIN: 7.7 GM/DL (ref 13.5–18)
HEMOGLOBIN: 8.1 GM/DL (ref 13.5–18)
HEMOGLOBIN: 8.2 GM/DL (ref 13.5–18)
HEMOGLOBIN: 8.3 GM/DL (ref 13.5–18)
HEMOGLOBIN: 8.4 GM/DL (ref 13.5–18)
HEMOGLOBIN: 8.5 GM/DL (ref 13.5–18)
HEMOGLOBIN: 8.7 GM/DL (ref 13.5–18)
HEMOGLOBIN: 9 GM/DL (ref 13.5–18)
HEMOGLOBIN: 9.1 GM/DL (ref 13.5–18)
HEMOGLOBIN: 9.3 GM/DL (ref 13.5–18)
HEMOGLOBIN: 9.3 GM/DL (ref 13.5–18)
HEMOGLOBIN: 9.5 GM/DL (ref 13.5–18)
HEMOGLOBIN: 9.5 GM/DL (ref 13.5–18)
HIGH SENSITIVE C-REACTIVE PROTEIN: 154.7 MG/L
HIGH SENSITIVE C-REACTIVE PROTEIN: 160.2 MG/L
HIGH SENSITIVE C-REACTIVE PROTEIN: 170.4 MG/L
HIGH SENSITIVE C-REACTIVE PROTEIN: 197.7 MG/L
HIGH SENSITIVE C-REACTIVE PROTEIN: 231.3 MG/L
HIGH SENSITIVE C-REACTIVE PROTEIN: 267.8 MG/L
HIGH SENSITIVE C-REACTIVE PROTEIN: 290.6 MG/L
HIGH SENSITIVE C-REACTIVE PROTEIN: 30.4 MG/L
HIGH SENSITIVE C-REACTIVE PROTEIN: 48.4 MG/L
HIGH SENSITIVE C-REACTIVE PROTEIN: 49.3 MG/L
HIGH SENSITIVE C-REACTIVE PROTEIN: 81.1 MG/L
HIGH SENSITIVE C-REACTIVE PROTEIN: >300 MG/L
HUMAN METAPNEUMOVIRUS PCR: NOT DETECTED
IMMATURE NEUTROPHIL %: 0.8 % (ref 0–0.43)
IMMATURE NEUTROPHIL %: 1.1 % (ref 0–0.43)
IMMATURE NEUTROPHIL %: 2 % (ref 0–0.43)
IMMATURE NEUTROPHIL %: 2.8 % (ref 0–0.43)
IMMATURE NEUTROPHIL %: 5 % (ref 0–0.43)
IMMATURE NEUTROPHIL %: 5.9 % (ref 0–0.43)
INFLUENZA A BY PCR: NOT DETECTED
INFLUENZA A H1 (2009) PCR: NOT DETECTED
INFLUENZA A H1 PANDEMIC PCR: NOT DETECTED
INFLUENZA A H3 PCR: NOT DETECTED
INFLUENZA B BY PCR: NOT DETECTED
IONIZED CA: 1.14 MMOL/L (ref 1.12–1.32)
IONIZED CA: 1.17 MMOL/L (ref 1.12–1.32)
KETONES, URINE: NEGATIVE MG/DL
LACTATE: 1.2 MMOL/L (ref 0.4–2)
LEUKOCYTE ESTERASE, URINE: NEGATIVE
LV EF: 53 %
LVEF MODALITY: NORMAL
LYMPHOCYTES ABSOLUTE: 0.2 K/CU MM
LYMPHOCYTES ABSOLUTE: 0.2 K/CU MM
LYMPHOCYTES ABSOLUTE: 0.3 K/CU MM
LYMPHOCYTES ABSOLUTE: 0.4 K/CU MM
LYMPHOCYTES ABSOLUTE: 0.5 K/CU MM
LYMPHOCYTES ABSOLUTE: 0.6 K/CU MM
LYMPHOCYTES ABSOLUTE: 0.7 K/CU MM
LYMPHOCYTES ABSOLUTE: 0.8 K/CU MM
LYMPHOCYTES ABSOLUTE: 0.8 K/CU MM
LYMPHOCYTES ABSOLUTE: 0.9 K/CU MM
LYMPHOCYTES ABSOLUTE: 0.9 K/CU MM
LYMPHOCYTES ABSOLUTE: 1 K/CU MM
LYMPHOCYTES ABSOLUTE: 1 K/CU MM
LYMPHOCYTES ABSOLUTE: 1.1 K/CU MM
LYMPHOCYTES ABSOLUTE: 1.2 K/CU MM
LYMPHOCYTES ABSOLUTE: 1.2 K/CU MM
LYMPHOCYTES ABSOLUTE: 1.5 K/CU MM
LYMPHOCYTES ABSOLUTE: 1.7 K/CU MM
LYMPHOCYTES ABSOLUTE: 1.9 K/CU MM
LYMPHOCYTES RELATIVE PERCENT: 1 % (ref 24–44)
LYMPHOCYTES RELATIVE PERCENT: 1 % (ref 24–44)
LYMPHOCYTES RELATIVE PERCENT: 10 % (ref 24–44)
LYMPHOCYTES RELATIVE PERCENT: 11.8 % (ref 24–44)
LYMPHOCYTES RELATIVE PERCENT: 2 % (ref 24–44)
LYMPHOCYTES RELATIVE PERCENT: 3 % (ref 24–44)
LYMPHOCYTES RELATIVE PERCENT: 4 % (ref 24–44)
LYMPHOCYTES RELATIVE PERCENT: 4.4 % (ref 24–44)
LYMPHOCYTES RELATIVE PERCENT: 5.1 % (ref 24–44)
LYMPHOCYTES RELATIVE PERCENT: 6 % (ref 24–44)
LYMPHOCYTES RELATIVE PERCENT: 6 % (ref 24–44)
LYMPHOCYTES RELATIVE PERCENT: 7.1 % (ref 24–44)
LYMPHOCYTES RELATIVE PERCENT: 7.3 % (ref 24–44)
LYMPHOCYTES RELATIVE PERCENT: 8 % (ref 24–44)
LYMPHOCYTES RELATIVE PERCENT: 8.3 % (ref 24–44)
Lab: ABNORMAL
Lab: NORMAL
MACROCYTES: ABNORMAL
MAGNESIUM: 1.9 MG/DL (ref 1.8–2.4)
MAGNESIUM: 2.3 MG/DL (ref 1.8–2.4)
MCH RBC QN AUTO: 31.1 PG (ref 27–31)
MCH RBC QN AUTO: 31.1 PG (ref 27–31)
MCH RBC QN AUTO: 31.2 PG (ref 27–31)
MCH RBC QN AUTO: 31.2 PG (ref 27–31)
MCH RBC QN AUTO: 31.3 PG (ref 27–31)
MCH RBC QN AUTO: 31.4 PG (ref 27–31)
MCH RBC QN AUTO: 31.4 PG (ref 27–31)
MCH RBC QN AUTO: 31.5 PG (ref 27–31)
MCH RBC QN AUTO: 31.6 PG (ref 27–31)
MCH RBC QN AUTO: 31.7 PG (ref 27–31)
MCH RBC QN AUTO: 31.8 PG (ref 27–31)
MCH RBC QN AUTO: 31.8 PG (ref 27–31)
MCH RBC QN AUTO: 32 PG (ref 27–31)
MCH RBC QN AUTO: 32.1 PG (ref 27–31)
MCH RBC QN AUTO: 32.2 PG (ref 27–31)
MCH RBC QN AUTO: 32.7 PG (ref 27–31)
MCH RBC QN AUTO: 32.9 PG (ref 27–31)
MCH RBC QN AUTO: 33.6 PG (ref 27–31)
MCH RBC QN AUTO: 33.7 PG (ref 27–31)
MCH RBC QN AUTO: 34.2 PG (ref 27–31)
MCHC RBC AUTO-ENTMCNC: 29.4 % (ref 32–36)
MCHC RBC AUTO-ENTMCNC: 29.6 % (ref 32–36)
MCHC RBC AUTO-ENTMCNC: 29.8 % (ref 32–36)
MCHC RBC AUTO-ENTMCNC: 29.8 % (ref 32–36)
MCHC RBC AUTO-ENTMCNC: 30 % (ref 32–36)
MCHC RBC AUTO-ENTMCNC: 30.1 % (ref 32–36)
MCHC RBC AUTO-ENTMCNC: 30.4 % (ref 32–36)
MCHC RBC AUTO-ENTMCNC: 30.4 % (ref 32–36)
MCHC RBC AUTO-ENTMCNC: 30.5 % (ref 32–36)
MCHC RBC AUTO-ENTMCNC: 30.6 % (ref 32–36)
MCHC RBC AUTO-ENTMCNC: 30.6 % (ref 32–36)
MCHC RBC AUTO-ENTMCNC: 30.7 % (ref 32–36)
MCHC RBC AUTO-ENTMCNC: 30.8 % (ref 32–36)
MCHC RBC AUTO-ENTMCNC: 30.8 % (ref 32–36)
MCHC RBC AUTO-ENTMCNC: 30.9 % (ref 32–36)
MCHC RBC AUTO-ENTMCNC: 31 % (ref 32–36)
MCHC RBC AUTO-ENTMCNC: 31.4 % (ref 32–36)
MCHC RBC AUTO-ENTMCNC: 31.6 % (ref 32–36)
MCHC RBC AUTO-ENTMCNC: 31.7 % (ref 32–36)
MCHC RBC AUTO-ENTMCNC: 31.8 % (ref 32–36)
MCHC RBC AUTO-ENTMCNC: 31.9 % (ref 32–36)
MCHC RBC AUTO-ENTMCNC: 32.1 % (ref 32–36)
MCHC RBC AUTO-ENTMCNC: 32.4 % (ref 32–36)
MCHC RBC AUTO-ENTMCNC: 34.4 % (ref 32–36)
MCV RBC AUTO: 100 FL (ref 78–100)
MCV RBC AUTO: 100.9 FL (ref 78–100)
MCV RBC AUTO: 101.6 FL (ref 78–100)
MCV RBC AUTO: 101.9 FL (ref 78–100)
MCV RBC AUTO: 102.3 FL (ref 78–100)
MCV RBC AUTO: 102.6 FL (ref 78–100)
MCV RBC AUTO: 103.1 FL (ref 78–100)
MCV RBC AUTO: 103.1 FL (ref 78–100)
MCV RBC AUTO: 103.8 FL (ref 78–100)
MCV RBC AUTO: 103.9 FL (ref 78–100)
MCV RBC AUTO: 104.1 FL (ref 78–100)
MCV RBC AUTO: 104.5 FL (ref 78–100)
MCV RBC AUTO: 104.5 FL (ref 78–100)
MCV RBC AUTO: 105.8 FL (ref 78–100)
MCV RBC AUTO: 106.1 FL (ref 78–100)
MCV RBC AUTO: 106.5 FL (ref 78–100)
MCV RBC AUTO: 108.9 FL (ref 78–100)
MCV RBC AUTO: 109.3 FL (ref 78–100)
MCV RBC AUTO: 111.3 FL (ref 78–100)
MCV RBC AUTO: 97.8 FL (ref 78–100)
MCV RBC AUTO: 98.5 FL (ref 78–100)
MCV RBC AUTO: 98.8 FL (ref 78–100)
MCV RBC AUTO: 99.4 FL (ref 78–100)
MCV RBC AUTO: 99.7 FL (ref 78–100)
METAMYELOCYTES ABSOLUTE COUNT: 0.19 K/CU MM
METAMYELOCYTES ABSOLUTE COUNT: 0.35 K/CU MM
METAMYELOCYTES ABSOLUTE COUNT: 0.38 K/CU MM
METAMYELOCYTES ABSOLUTE COUNT: 0.41 K/CU MM
METAMYELOCYTES ABSOLUTE COUNT: 0.43 K/CU MM
METAMYELOCYTES ABSOLUTE COUNT: 0.44 K/CU MM
METAMYELOCYTES ABSOLUTE COUNT: 0.44 K/CU MM
METAMYELOCYTES ABSOLUTE COUNT: 0.47 K/CU MM
METAMYELOCYTES ABSOLUTE COUNT: 0.47 K/CU MM
METAMYELOCYTES ABSOLUTE COUNT: 0.57 K/CU MM
METAMYELOCYTES ABSOLUTE COUNT: 0.67 K/CU MM
METAMYELOCYTES ABSOLUTE COUNT: 0.68 K/CU MM
METAMYELOCYTES ABSOLUTE COUNT: 0.74 K/CU MM
METAMYELOCYTES ABSOLUTE COUNT: 0.98 K/CU MM
METAMYELOCYTES ABSOLUTE COUNT: 0.99 K/CU MM
METAMYELOCYTES ABSOLUTE COUNT: 1.26 K/CU MM
METAMYELOCYTES PERCENT: 1 %
METAMYELOCYTES PERCENT: 2 %
METAMYELOCYTES PERCENT: 3 %
METAMYELOCYTES PERCENT: 4 %
METAMYELOCYTES PERCENT: 5 %
METAMYELOCYTES PERCENT: 5 %
METAMYELOCYTES PERCENT: 6 %
METHEMOGLOBIN ARTERIAL: 0 %
METHEMOGLOBIN ARTERIAL: 0.7 %
METHEMOGLOBIN ARTERIAL: 0.8 %
METHEMOGLOBIN ARTERIAL: 0.8 %
METHEMOGLOBIN ARTERIAL: 0.9 %
METHEMOGLOBIN ARTERIAL: 1 %
METHEMOGLOBIN ARTERIAL: 1.1 %
METHEMOGLOBIN ARTERIAL: 1.2 %
METHEMOGLOBIN ARTERIAL: 1.3 %
METHEMOGLOBIN ARTERIAL: 1.4 %
METHEMOGLOBIN ARTERIAL: 1.4 %
METHEMOGLOBIN ARTERIAL: 1.7 %
METHEMOGLOBIN ARTERIAL: 1.8 %
METHEMOGLOBIN ARTERIAL: 1.8 %
METHEMOGLOBIN ARTERIAL: 1.9 %
MONOCYTES ABSOLUTE: 0.1 K/CU MM
MONOCYTES ABSOLUTE: 0.2 K/CU MM
MONOCYTES ABSOLUTE: 0.3 K/CU MM
MONOCYTES ABSOLUTE: 0.4 K/CU MM
MONOCYTES ABSOLUTE: 0.4 K/CU MM
MONOCYTES ABSOLUTE: 0.5 K/CU MM
MONOCYTES ABSOLUTE: 0.6 K/CU MM
MONOCYTES ABSOLUTE: 0.6 K/CU MM
MONOCYTES ABSOLUTE: 0.7 K/CU MM
MONOCYTES ABSOLUTE: 0.7 K/CU MM
MONOCYTES ABSOLUTE: 0.9 K/CU MM
MONOCYTES ABSOLUTE: 1 K/CU MM
MONOCYTES ABSOLUTE: 1 K/CU MM
MONOCYTES ABSOLUTE: 1.5 K/CU MM
MONOCYTES RELATIVE PERCENT: 1 % (ref 0–4)
MONOCYTES RELATIVE PERCENT: 2 % (ref 0–4)
MONOCYTES RELATIVE PERCENT: 3 % (ref 0–4)
MONOCYTES RELATIVE PERCENT: 3 % (ref 0–4)
MONOCYTES RELATIVE PERCENT: 3.2 % (ref 0–4)
MONOCYTES RELATIVE PERCENT: 3.6 % (ref 0–4)
MONOCYTES RELATIVE PERCENT: 3.7 % (ref 0–4)
MONOCYTES RELATIVE PERCENT: 4 % (ref 0–4)
MONOCYTES RELATIVE PERCENT: 4.2 % (ref 0–4)
MONOCYTES RELATIVE PERCENT: 4.5 % (ref 0–4)
MONOCYTES RELATIVE PERCENT: 4.7 % (ref 0–4)
MONOCYTES RELATIVE PERCENT: 5 % (ref 0–4)
MUCUS: ABNORMAL HPF
MYCOPLASMA PNEUMONIAE PCR: NOT DETECTED
MYELOCYTE PERCENT: 1 %
MYELOCYTE PERCENT: 2 %
MYELOCYTE PERCENT: 3 %
MYELOCYTE PERCENT: 4 %
MYELOCYTES ABSOLUTE COUNT: 0.14 K/CU MM
MYELOCYTES ABSOLUTE COUNT: 0.16 K/CU MM
MYELOCYTES ABSOLUTE COUNT: 0.17 K/CU MM
MYELOCYTES ABSOLUTE COUNT: 0.17 K/CU MM
MYELOCYTES ABSOLUTE COUNT: 0.19 K/CU MM
MYELOCYTES ABSOLUTE COUNT: 0.28 K/CU MM
MYELOCYTES ABSOLUTE COUNT: 0.35 K/CU MM
MYELOCYTES ABSOLUTE COUNT: 0.44 K/CU MM
MYELOCYTES ABSOLUTE COUNT: 0.45 K/CU MM
MYELOCYTES ABSOLUTE COUNT: 0.47 K/CU MM
MYELOCYTES ABSOLUTE COUNT: 0.59 K/CU MM
MYELOCYTES ABSOLUTE COUNT: 0.74 K/CU MM
MYELOCYTES ABSOLUTE COUNT: 1.01 K/CU MM
NITRITE URINE, QUANTITATIVE: NEGATIVE
NUCLEATED RBC %: 0 %
NUCLEATED RBC %: 0.1 %
NUCLEATED RED BLOOD CELLS: 1
NUCLEATED RED BLOOD CELLS: 2
NUCLEATED RED BLOOD CELLS: 2
NUCLEATED RED BLOOD CELLS: 4
O2 SAT, VEN: 89.8 % (ref 50–70)
O2 SATURATION: 59.1 % (ref 96–97)
O2 SATURATION: 74.4 % (ref 96–97)
O2 SATURATION: 76.2 % (ref 96–97)
O2 SATURATION: 77.2 % (ref 96–97)
O2 SATURATION: 81.6 % (ref 96–97)
O2 SATURATION: 82.5 % (ref 96–97)
O2 SATURATION: 83.5 % (ref 96–97)
O2 SATURATION: 83.7 % (ref 96–97)
O2 SATURATION: 84.7 % (ref 96–97)
O2 SATURATION: 85.7 % (ref 96–97)
O2 SATURATION: 87 % (ref 96–97)
O2 SATURATION: 87.9 % (ref 96–97)
O2 SATURATION: 88.1 % (ref 96–97)
O2 SATURATION: 88.7 % (ref 96–97)
O2 SATURATION: 89.4 % (ref 96–97)
O2 SATURATION: 89.9 % (ref 96–97)
O2 SATURATION: 89.9 % (ref 96–97)
O2 SATURATION: 90.6 % (ref 96–97)
O2 SATURATION: 90.7 % (ref 96–97)
O2 SATURATION: 90.7 % (ref 96–97)
O2 SATURATION: 91.6 % (ref 96–97)
O2 SATURATION: 92.1 % (ref 96–97)
O2 SATURATION: 92.2 % (ref 96–97)
O2 SATURATION: 92.5 % (ref 96–97)
O2 SATURATION: 94.4 % (ref 96–97)
O2 SATURATION: 95.3 % (ref 96–97)
O2 SATURATION: 95.9 % (ref 96–97)
O2 SATURATION: 97.4 % (ref 96–97)
PARAINFLUENZA 1 PCR: NOT DETECTED
PARAINFLUENZA 2 PCR: NOT DETECTED
PARAINFLUENZA 3 PCR: NOT DETECTED
PARAINFLUENZA 4 PCR: NOT DETECTED
PCO2 ARTERIAL: 29 MMHG (ref 32–45)
PCO2 ARTERIAL: 30 MMHG (ref 32–45)
PCO2 ARTERIAL: 31 MMHG (ref 32–45)
PCO2 ARTERIAL: 31 MMHG (ref 32–45)
PCO2 ARTERIAL: 32 MMHG (ref 32–45)
PCO2 ARTERIAL: 33 MMHG (ref 32–45)
PCO2 ARTERIAL: 34 MMHG (ref 32–45)
PCO2 ARTERIAL: 36 MMHG (ref 32–45)
PCO2 ARTERIAL: 36 MMHG (ref 32–45)
PCO2 ARTERIAL: 37 MMHG (ref 32–45)
PCO2 ARTERIAL: 38 MMHG (ref 32–45)
PCO2 ARTERIAL: 39 MMHG (ref 32–45)
PCO2 ARTERIAL: 40 MMHG (ref 32–45)
PCO2 ARTERIAL: 40 MMHG (ref 32–45)
PCO2 ARTERIAL: 41 MMHG (ref 32–45)
PCO2 ARTERIAL: 42 MMHG (ref 32–45)
PCO2 ARTERIAL: 43 MMHG (ref 32–45)
PCO2 ARTERIAL: 44 MMHG (ref 32–45)
PCO2 ARTERIAL: 46 MMHG (ref 32–45)
PCO2 ARTERIAL: 52 MMHG (ref 32–45)
PCO2 ARTERIAL: 52 MMHG (ref 32–45)
PCO2 ARTERIAL: 57 MMHG (ref 32–45)
PCO2 ARTERIAL: 60 MMHG (ref 32–45)
PCO2 ARTERIAL: 64 MMHG (ref 32–45)
PCO2, VEN: 30 MMHG (ref 38–52)
PDW BLD-RTO: 11.9 % (ref 11.7–14.9)
PDW BLD-RTO: 12.2 % (ref 11.7–14.9)
PDW BLD-RTO: 12.3 % (ref 11.7–14.9)
PDW BLD-RTO: 12.3 % (ref 11.7–14.9)
PDW BLD-RTO: 12.4 % (ref 11.7–14.9)
PDW BLD-RTO: 12.4 % (ref 11.7–14.9)
PDW BLD-RTO: 12.6 % (ref 11.7–14.9)
PDW BLD-RTO: 12.9 % (ref 11.7–14.9)
PDW BLD-RTO: 13.2 % (ref 11.7–14.9)
PDW BLD-RTO: 13.4 % (ref 11.7–14.9)
PDW BLD-RTO: 13.5 % (ref 11.7–14.9)
PDW BLD-RTO: 13.6 % (ref 11.7–14.9)
PDW BLD-RTO: 13.8 % (ref 11.7–14.9)
PDW BLD-RTO: 14 % (ref 11.7–14.9)
PDW BLD-RTO: 14 % (ref 11.7–14.9)
PDW BLD-RTO: 15 % (ref 11.7–14.9)
PDW BLD-RTO: 16.7 % (ref 11.7–14.9)
PDW BLD-RTO: 17.1 % (ref 11.7–14.9)
PDW BLD-RTO: 18 % (ref 11.7–14.9)
PDW BLD-RTO: 18.6 % (ref 11.7–14.9)
PH BLOOD: 7.21 (ref 7.34–7.45)
PH BLOOD: 7.24 (ref 7.34–7.45)
PH BLOOD: 7.26 (ref 7.34–7.45)
PH BLOOD: 7.26 (ref 7.34–7.45)
PH BLOOD: 7.29 (ref 7.34–7.45)
PH BLOOD: 7.31 (ref 7.34–7.45)
PH BLOOD: 7.31 (ref 7.34–7.45)
PH BLOOD: 7.33 (ref 7.34–7.45)
PH BLOOD: 7.34 (ref 7.34–7.45)
PH BLOOD: 7.34 (ref 7.34–7.45)
PH BLOOD: 7.35 (ref 7.34–7.45)
PH BLOOD: 7.36 (ref 7.34–7.45)
PH BLOOD: 7.37 (ref 7.34–7.45)
PH BLOOD: 7.38 (ref 7.34–7.45)
PH BLOOD: 7.39 (ref 7.34–7.45)
PH BLOOD: 7.39 (ref 7.34–7.45)
PH BLOOD: 7.4 (ref 7.34–7.45)
PH BLOOD: 7.4 (ref 7.34–7.45)
PH BLOOD: 7.41 (ref 7.34–7.45)
PH BLOOD: 7.45 (ref 7.34–7.45)
PH BLOOD: 7.47 (ref 7.34–7.45)
PH VENOUS: 7.41 (ref 7.32–7.42)
PH, URINE: 6 (ref 5–8)
PHOSPHORUS: 4.2 MG/DL (ref 2.5–4.9)
PLATELET # BLD: 251 K/CU MM (ref 140–440)
PLATELET # BLD: 270 K/CU MM (ref 140–440)
PLATELET # BLD: 303 K/CU MM (ref 140–440)
PLATELET # BLD: 323 K/CU MM (ref 140–440)
PLATELET # BLD: 330 K/CU MM (ref 140–440)
PLATELET # BLD: 331 K/CU MM (ref 140–440)
PLATELET # BLD: 333 K/CU MM (ref 140–440)
PLATELET # BLD: 343 K/CU MM (ref 140–440)
PLATELET # BLD: 345 K/CU MM (ref 140–440)
PLATELET # BLD: 354 K/CU MM (ref 140–440)
PLATELET # BLD: 354 K/CU MM (ref 140–440)
PLATELET # BLD: 356 K/CU MM (ref 140–440)
PLATELET # BLD: 373 K/CU MM (ref 140–440)
PLATELET # BLD: 376 K/CU MM (ref 140–440)
PLATELET # BLD: 380 K/CU MM (ref 140–440)
PLATELET # BLD: 384 K/CU MM (ref 140–440)
PLATELET # BLD: 391 K/CU MM (ref 140–440)
PLATELET # BLD: 392 K/CU MM (ref 140–440)
PLATELET # BLD: 394 K/CU MM (ref 140–440)
PLATELET # BLD: 433 K/CU MM (ref 140–440)
PLATELET # BLD: 434 K/CU MM (ref 140–440)
PLATELET # BLD: 445 K/CU MM (ref 140–440)
PLATELET # BLD: 481 K/CU MM (ref 140–440)
PLATELET # BLD: 680 K/CU MM (ref 140–440)
PLT MORPHOLOGY: ABNORMAL
PLT MORPHOLOGY: ADEQUATE
PLT MORPHOLOGY: ADEQUATE
PMV BLD AUTO: 10 FL (ref 7.5–11.1)
PMV BLD AUTO: 10.1 FL (ref 7.5–11.1)
PMV BLD AUTO: 10.2 FL (ref 7.5–11.1)
PMV BLD AUTO: 10.2 FL (ref 7.5–11.1)
PMV BLD AUTO: 10.3 FL (ref 7.5–11.1)
PMV BLD AUTO: 10.3 FL (ref 7.5–11.1)
PMV BLD AUTO: 10.4 FL (ref 7.5–11.1)
PMV BLD AUTO: 10.4 FL (ref 7.5–11.1)
PMV BLD AUTO: 10.5 FL (ref 7.5–11.1)
PMV BLD AUTO: 10.7 FL (ref 7.5–11.1)
PMV BLD AUTO: 10.8 FL (ref 7.5–11.1)
PMV BLD AUTO: 10.9 FL (ref 7.5–11.1)
PMV BLD AUTO: 8.5 FL (ref 7.5–11.1)
PMV BLD AUTO: 8.8 FL (ref 7.5–11.1)
PMV BLD AUTO: 9.4 FL (ref 7.5–11.1)
PMV BLD AUTO: 9.6 FL (ref 7.5–11.1)
PMV BLD AUTO: 9.9 FL (ref 7.5–11.1)
PO2 ARTERIAL: 101 MMHG (ref 75–100)
PO2 ARTERIAL: 109 MMHG (ref 75–100)
PO2 ARTERIAL: 46 MMHG (ref 75–100)
PO2 ARTERIAL: 49 MMHG (ref 75–100)
PO2 ARTERIAL: 50 MMHG (ref 75–100)
PO2 ARTERIAL: 50 MMHG (ref 75–100)
PO2 ARTERIAL: 51 MMHG (ref 75–100)
PO2 ARTERIAL: 54 MMHG (ref 75–100)
PO2 ARTERIAL: 55 MMHG (ref 75–100)
PO2 ARTERIAL: 56 MMHG (ref 75–100)
PO2 ARTERIAL: 58 MMHG (ref 75–100)
PO2 ARTERIAL: 59 MMHG (ref 75–100)
PO2 ARTERIAL: 60 MMHG (ref 75–100)
PO2 ARTERIAL: 62 MMHG (ref 75–100)
PO2 ARTERIAL: 63 MMHG (ref 75–100)
PO2 ARTERIAL: 64 MMHG (ref 75–100)
PO2 ARTERIAL: 64 MMHG (ref 75–100)
PO2 ARTERIAL: 65 MMHG (ref 75–100)
PO2 ARTERIAL: 66 MMHG (ref 75–100)
PO2 ARTERIAL: 70 MMHG (ref 75–100)
PO2 ARTERIAL: 70 MMHG (ref 75–100)
PO2 ARTERIAL: 71 MMHG (ref 75–100)
PO2 ARTERIAL: 73 MMHG (ref 75–100)
PO2 ARTERIAL: 76 MMHG (ref 75–100)
PO2 ARTERIAL: 82 MMHG (ref 75–100)
PO2 ARTERIAL: 91 MMHG (ref 75–100)
PO2, VEN: 66 MMHG (ref 28–48)
POLYCHROMASIA: ABNORMAL
POTASSIUM SERPL-SCNC: 3.3 MMOL/L (ref 3.5–5.1)
POTASSIUM SERPL-SCNC: 3.5 MMOL/L (ref 3.5–5.1)
POTASSIUM SERPL-SCNC: 3.6 MMOL/L (ref 3.5–5.1)
POTASSIUM SERPL-SCNC: 3.7 MMOL/L (ref 3.5–5.1)
POTASSIUM SERPL-SCNC: 3.8 MMOL/L (ref 3.5–5.1)
POTASSIUM SERPL-SCNC: 3.8 MMOL/L (ref 3.5–5.1)
POTASSIUM SERPL-SCNC: 3.9 MMOL/L (ref 3.5–5.1)
POTASSIUM SERPL-SCNC: 3.9 MMOL/L (ref 3.5–5.1)
POTASSIUM SERPL-SCNC: 4 MMOL/L (ref 3.5–5.1)
POTASSIUM SERPL-SCNC: 4 MMOL/L (ref 3.5–5.1)
POTASSIUM SERPL-SCNC: 4.1 MMOL/L (ref 3.5–5.1)
POTASSIUM SERPL-SCNC: 4.2 MMOL/L (ref 3.5–5.1)
POTASSIUM SERPL-SCNC: 4.3 MMOL/L (ref 3.5–5.1)
POTASSIUM SERPL-SCNC: 4.3 MMOL/L (ref 3.5–5.1)
POTASSIUM SERPL-SCNC: 4.4 MMOL/L (ref 3.5–5.1)
POTASSIUM SERPL-SCNC: 4.5 MMOL/L (ref 3.5–5.1)
POTASSIUM SERPL-SCNC: 4.6 MMOL/L (ref 3.5–5.1)
POTASSIUM SERPL-SCNC: 4.6 MMOL/L (ref 3.5–5.1)
PRO-BNP: 179.9 PG/ML
PRO-BNP: 49.84 PG/ML
PROCALCITONIN: 0.29
PROCALCITONIN: 0.37
PROCALCITONIN: 0.47
PROCALCITONIN: 0.8
PROCALCITONIN: 1.52
PROCALCITONIN: 2.11
PROCALCITONIN: 2.27
PROCALCITONIN: 2.35
PROCALCITONIN: 3.01
PROCALCITONIN: 6.53
PROMYELOCYTES ABSOLUTE COUNT: 0.14 K/CU MM
PROMYELOCYTES PERCENT: 1 %
PROTEIN UA: 30 MG/DL
RBC # BLD: 2.29 M/CU MM (ref 4.6–6.2)
RBC # BLD: 2.62 M/CU MM (ref 4.6–6.2)
RBC # BLD: 2.67 M/CU MM (ref 4.6–6.2)
RBC # BLD: 2.7 M/CU MM (ref 4.6–6.2)
RBC # BLD: 2.7 M/CU MM (ref 4.6–6.2)
RBC # BLD: 2.75 M/CU MM (ref 4.6–6.2)
RBC # BLD: 2.83 M/CU MM (ref 4.6–6.2)
RBC # BLD: 2.89 M/CU MM (ref 4.6–6.2)
RBC # BLD: 2.91 M/CU MM (ref 4.6–6.2)
RBC # BLD: 2.95 M/CU MM (ref 4.6–6.2)
RBC # BLD: 3.04 M/CU MM (ref 4.6–6.2)
RBC # BLD: 3.05 M/CU MM (ref 4.6–6.2)
RBC # BLD: 3.1 M/CU MM (ref 4.6–6.2)
RBC # BLD: 3.17 M/CU MM (ref 4.6–6.2)
RBC # BLD: 3.17 M/CU MM (ref 4.6–6.2)
RBC # BLD: 3.35 M/CU MM (ref 4.6–6.2)
RBC # BLD: 3.48 M/CU MM (ref 4.6–6.2)
RBC # BLD: 3.56 M/CU MM (ref 4.6–6.2)
RBC # BLD: 3.92 M/CU MM (ref 4.6–6.2)
RBC # BLD: 4.07 M/CU MM (ref 4.6–6.2)
RBC # BLD: 4.08 M/CU MM (ref 4.6–6.2)
RBC # BLD: 4.36 M/CU MM (ref 4.6–6.2)
RBC # BLD: ABNORMAL 10*6/UL
RBC URINE: <1 /HPF (ref 0–3)
REASON FOR REJECTION: NORMAL
REJECTED TEST: NORMAL
RHINOVIRUS ENTEROVIRUS PCR: NOT DETECTED
RSV PCR: NOT DETECTED
SARS-COV-2: ABNORMAL
SEGMENTED NEUTROPHILS ABSOLUTE COUNT: 10.3 K/CU MM
SEGMENTED NEUTROPHILS ABSOLUTE COUNT: 11.6 K/CU MM
SEGMENTED NEUTROPHILS ABSOLUTE COUNT: 11.9 K/CU MM
SEGMENTED NEUTROPHILS ABSOLUTE COUNT: 12.3 K/CU MM
SEGMENTED NEUTROPHILS ABSOLUTE COUNT: 12.3 K/CU MM
SEGMENTED NEUTROPHILS ABSOLUTE COUNT: 12.5 K/CU MM
SEGMENTED NEUTROPHILS ABSOLUTE COUNT: 13.1 K/CU MM
SEGMENTED NEUTROPHILS ABSOLUTE COUNT: 13.7 K/CU MM
SEGMENTED NEUTROPHILS ABSOLUTE COUNT: 14 K/CU MM
SEGMENTED NEUTROPHILS ABSOLUTE COUNT: 14.1 K/CU MM
SEGMENTED NEUTROPHILS ABSOLUTE COUNT: 15 K/CU MM
SEGMENTED NEUTROPHILS ABSOLUTE COUNT: 15.7 K/CU MM
SEGMENTED NEUTROPHILS ABSOLUTE COUNT: 15.8 K/CU MM
SEGMENTED NEUTROPHILS ABSOLUTE COUNT: 16.5 K/CU MM
SEGMENTED NEUTROPHILS ABSOLUTE COUNT: 17.2 K/CU MM
SEGMENTED NEUTROPHILS ABSOLUTE COUNT: 17.9 K/CU MM
SEGMENTED NEUTROPHILS ABSOLUTE COUNT: 18.3 K/CU MM
SEGMENTED NEUTROPHILS ABSOLUTE COUNT: 18.9 K/CU MM
SEGMENTED NEUTROPHILS ABSOLUTE COUNT: 19.2 K/CU MM
SEGMENTED NEUTROPHILS ABSOLUTE COUNT: 19.6 K/CU MM
SEGMENTED NEUTROPHILS ABSOLUTE COUNT: 24.4 K/CU MM
SEGMENTED NEUTROPHILS ABSOLUTE COUNT: 31.2 K/CU MM
SEGMENTED NEUTROPHILS ABSOLUTE COUNT: 5.9 K/CU MM
SEGMENTED NEUTROPHILS ABSOLUTE COUNT: 8.5 K/CU MM
SEGMENTED NEUTROPHILS RELATIVE PERCENT: 73 % (ref 36–66)
SEGMENTED NEUTROPHILS RELATIVE PERCENT: 75 % (ref 36–66)
SEGMENTED NEUTROPHILS RELATIVE PERCENT: 78 % (ref 36–66)
SEGMENTED NEUTROPHILS RELATIVE PERCENT: 78 % (ref 36–66)
SEGMENTED NEUTROPHILS RELATIVE PERCENT: 80 % (ref 36–66)
SEGMENTED NEUTROPHILS RELATIVE PERCENT: 81 % (ref 36–66)
SEGMENTED NEUTROPHILS RELATIVE PERCENT: 82 % (ref 36–66)
SEGMENTED NEUTROPHILS RELATIVE PERCENT: 82.4 % (ref 36–66)
SEGMENTED NEUTROPHILS RELATIVE PERCENT: 83 % (ref 36–66)
SEGMENTED NEUTROPHILS RELATIVE PERCENT: 83 % (ref 36–66)
SEGMENTED NEUTROPHILS RELATIVE PERCENT: 84 % (ref 36–66)
SEGMENTED NEUTROPHILS RELATIVE PERCENT: 84 % (ref 36–66)
SEGMENTED NEUTROPHILS RELATIVE PERCENT: 84.1 % (ref 36–66)
SEGMENTED NEUTROPHILS RELATIVE PERCENT: 85 % (ref 36–66)
SEGMENTED NEUTROPHILS RELATIVE PERCENT: 85 % (ref 36–66)
SEGMENTED NEUTROPHILS RELATIVE PERCENT: 85.6 % (ref 36–66)
SEGMENTED NEUTROPHILS RELATIVE PERCENT: 85.7 % (ref 36–66)
SEGMENTED NEUTROPHILS RELATIVE PERCENT: 86 % (ref 36–66)
SEGMENTED NEUTROPHILS RELATIVE PERCENT: 88 % (ref 36–66)
SEGMENTED NEUTROPHILS RELATIVE PERCENT: 88 % (ref 36–66)
SEGMENTED NEUTROPHILS RELATIVE PERCENT: 88.4 % (ref 36–66)
SODIUM BLD-SCNC: 139 MMOL/L (ref 135–145)
SODIUM BLD-SCNC: 140 MMOL/L (ref 135–145)
SODIUM BLD-SCNC: 140 MMOL/L (ref 135–145)
SODIUM BLD-SCNC: 141 MMOL/L (ref 135–145)
SODIUM BLD-SCNC: 141 MMOL/L (ref 135–145)
SODIUM BLD-SCNC: 142 MMOL/L (ref 135–145)
SODIUM BLD-SCNC: 142 MMOL/L (ref 135–145)
SODIUM BLD-SCNC: 143 MMOL/L (ref 135–145)
SODIUM BLD-SCNC: 146 MMOL/L (ref 135–145)
SODIUM BLD-SCNC: 147 MMOL/L (ref 135–145)
SODIUM BLD-SCNC: 148 MMOL/L (ref 135–145)
SODIUM BLD-SCNC: 149 MMOL/L (ref 135–145)
SODIUM BLD-SCNC: 149 MMOL/L (ref 135–145)
SODIUM BLD-SCNC: 151 MMOL/L (ref 135–145)
SODIUM BLD-SCNC: 152 MMOL/L (ref 135–145)
SODIUM BLD-SCNC: 152 MMOL/L (ref 135–145)
SODIUM BLD-SCNC: 153 MMOL/L (ref 135–145)
SPECIFIC GRAVITY UA: 1.03 (ref 1–1.03)
SPECIMEN: ABNORMAL
SPECIMEN: NORMAL
SPERM: ABNORMAL /HFP
STOMATOCYTES: ABNORMAL
TEAR DROP CELLS: ABNORMAL
TOTAL IMMATURE NEUTOROPHIL: 0.08 K/CU MM
TOTAL IMMATURE NEUTOROPHIL: 0.08 K/CU MM
TOTAL IMMATURE NEUTOROPHIL: 0.23 K/CU MM
TOTAL IMMATURE NEUTOROPHIL: 0.44 K/CU MM
TOTAL IMMATURE NEUTOROPHIL: 0.83 K/CU MM
TOTAL IMMATURE NEUTOROPHIL: 1.05 K/CU MM
TOTAL NUCLEATED RBC: 0 K/CU MM
TOTAL PROTEIN: 5.1 GM/DL (ref 6.4–8.2)
TOTAL PROTEIN: 5.4 GM/DL (ref 6.4–8.2)
TOTAL PROTEIN: 5.5 GM/DL (ref 6.4–8.2)
TOTAL PROTEIN: 5.5 GM/DL (ref 6.4–8.2)
TOTAL PROTEIN: 5.6 GM/DL (ref 6.4–8.2)
TOTAL PROTEIN: 5.6 GM/DL (ref 6.4–8.2)
TOTAL PROTEIN: 6 GM/DL (ref 6.4–8.2)
TOTAL PROTEIN: 6.1 GM/DL (ref 6.4–8.2)
TOTAL PROTEIN: 6.2 GM/DL (ref 6.4–8.2)
TOTAL PROTEIN: 6.3 GM/DL (ref 6.4–8.2)
TOXIC GRANULATION: PRESENT
TROPONIN T: <0.01 NG/ML
UROBILINOGEN, URINE: NEGATIVE MG/DL (ref 0.2–1)
VANCOMYCIN RANDOM: 23.1 UG/ML
VANCOMYCIN RANDOM: 23.4 UG/ML
VANCOMYCIN TROUGH: 14.3 UG/ML (ref 10–20)
WBC # BLD: 11.6 K/CU MM (ref 4–10.5)
WBC # BLD: 13.8 K/CU MM (ref 4–10.5)
WBC # BLD: 14.9 K/CU MM (ref 4–10.5)
WBC # BLD: 15 K/CU MM (ref 4–10.5)
WBC # BLD: 15.5 K/CU MM (ref 4–10.5)
WBC # BLD: 15.9 K/CU MM (ref 4–10.5)
WBC # BLD: 16.5 K/CU MM (ref 4–10.5)
WBC # BLD: 16.7 K/CU MM (ref 4–10.5)
WBC # BLD: 17.1 K/CU MM (ref 4–10.5)
WBC # BLD: 17.4 K/CU MM (ref 4–10.5)
WBC # BLD: 17.7 K/CU MM (ref 4–10.5)
WBC # BLD: 19.1 K/CU MM (ref 4–10.5)
WBC # BLD: 19.4 K/CU MM (ref 4–10.5)
WBC # BLD: 19.6 K/CU MM (ref 4–10.5)
WBC # BLD: 21.5 K/CU MM (ref 4–10.5)
WBC # BLD: 21.8 K/CU MM (ref 4–10.5)
WBC # BLD: 22.1 K/CU MM (ref 4–10.5)
WBC # BLD: 22.3 K/CU MM (ref 4–10.5)
WBC # BLD: 23.5 K/CU MM (ref 4–10.5)
WBC # BLD: 25.2 K/CU MM (ref 4–10.5)
WBC # BLD: 28.4 K/CU MM (ref 4–10.5)
WBC # BLD: 37.1 K/CU MM (ref 4–10.5)
WBC # BLD: 7.2 K/CU MM (ref 4–10.5)
WBC # BLD: 9.9 K/CU MM (ref 4–10.5)
WBC # BLD: ABNORMAL 10*3/UL
WBC # BLD: ABNORMAL 10*3/UL
WBC UA: <1 /HPF (ref 0–2)

## 2022-01-01 PROCEDURE — 6360000002 HC RX W HCPCS

## 2022-01-01 PROCEDURE — 82803 BLOOD GASES ANY COMBINATION: CPT

## 2022-01-01 PROCEDURE — 36592 COLLECT BLOOD FROM PICC: CPT

## 2022-01-01 PROCEDURE — 6360000002 HC RX W HCPCS: Performed by: INTERNAL MEDICINE

## 2022-01-01 PROCEDURE — 6370000000 HC RX 637 (ALT 250 FOR IP): Performed by: INTERNAL MEDICINE

## 2022-01-01 PROCEDURE — 71045 X-RAY EXAM CHEST 1 VIEW: CPT

## 2022-01-01 PROCEDURE — 84100 ASSAY OF PHOSPHORUS: CPT

## 2022-01-01 PROCEDURE — 82962 GLUCOSE BLOOD TEST: CPT

## 2022-01-01 PROCEDURE — C9113 INJ PANTOPRAZOLE SODIUM, VIA: HCPCS | Performed by: INTERNAL MEDICINE

## 2022-01-01 PROCEDURE — 6360000002 HC RX W HCPCS: Performed by: NURSE PRACTITIONER

## 2022-01-01 PROCEDURE — 2500000003 HC RX 250 WO HCPCS: Performed by: NURSE PRACTITIONER

## 2022-01-01 PROCEDURE — 2700000000 HC OXYGEN THERAPY PER DAY

## 2022-01-01 PROCEDURE — 6370000000 HC RX 637 (ALT 250 FOR IP): Performed by: NURSE PRACTITIONER

## 2022-01-01 PROCEDURE — 2580000003 HC RX 258: Performed by: INTERNAL MEDICINE

## 2022-01-01 PROCEDURE — 93306 TTE W/DOPPLER COMPLETE: CPT

## 2022-01-01 PROCEDURE — 85027 COMPLETE CBC AUTOMATED: CPT

## 2022-01-01 PROCEDURE — 2580000003 HC RX 258: Performed by: NURSE PRACTITIONER

## 2022-01-01 PROCEDURE — 80202 ASSAY OF VANCOMYCIN: CPT

## 2022-01-01 PROCEDURE — 76937 US GUIDE VASCULAR ACCESS: CPT

## 2022-01-01 PROCEDURE — 99233 SBSQ HOSP IP/OBS HIGH 50: CPT | Performed by: INTERNAL MEDICINE

## 2022-01-01 PROCEDURE — 94640 AIRWAY INHALATION TREATMENT: CPT

## 2022-01-01 PROCEDURE — 36600 WITHDRAWAL OF ARTERIAL BLOOD: CPT

## 2022-01-01 PROCEDURE — 74018 RADEX ABDOMEN 1 VIEW: CPT

## 2022-01-01 PROCEDURE — 89220 SPUTUM SPECIMEN COLLECTION: CPT

## 2022-01-01 PROCEDURE — 85379 FIBRIN DEGRADATION QUANT: CPT

## 2022-01-01 PROCEDURE — 70450 CT HEAD/BRAIN W/O DYE: CPT

## 2022-01-01 PROCEDURE — 94003 VENT MGMT INPAT SUBQ DAY: CPT

## 2022-01-01 PROCEDURE — 6360000002 HC RX W HCPCS: Performed by: STUDENT IN AN ORGANIZED HEALTH CARE EDUCATION/TRAINING PROGRAM

## 2022-01-01 PROCEDURE — 94761 N-INVAS EAR/PLS OXIMETRY MLT: CPT

## 2022-01-01 PROCEDURE — 74177 CT ABD & PELVIS W/CONTRAST: CPT

## 2022-01-01 PROCEDURE — 51702 INSERT TEMP BLADDER CATH: CPT

## 2022-01-01 PROCEDURE — 82330 ASSAY OF CALCIUM: CPT

## 2022-01-01 PROCEDURE — 93010 ELECTROCARDIOGRAM REPORT: CPT | Performed by: INTERNAL MEDICINE

## 2022-01-01 PROCEDURE — 93005 ELECTROCARDIOGRAM TRACING: CPT | Performed by: INTERNAL MEDICINE

## 2022-01-01 PROCEDURE — 85025 COMPLETE CBC W/AUTO DIFF WBC: CPT

## 2022-01-01 PROCEDURE — 84145 PROCALCITONIN (PCT): CPT

## 2022-01-01 PROCEDURE — 2500000003 HC RX 250 WO HCPCS

## 2022-01-01 PROCEDURE — 2580000003 HC RX 258: Performed by: STUDENT IN AN ORGANIZED HEALTH CARE EDUCATION/TRAINING PROGRAM

## 2022-01-01 PROCEDURE — 86141 C-REACTIVE PROTEIN HS: CPT

## 2022-01-01 PROCEDURE — 80053 COMPREHEN METABOLIC PANEL: CPT

## 2022-01-01 PROCEDURE — 85007 BL SMEAR W/DIFF WBC COUNT: CPT

## 2022-01-01 PROCEDURE — 6360000002 HC RX W HCPCS: Performed by: NURSE ANESTHETIST, CERTIFIED REGISTERED

## 2022-01-01 PROCEDURE — 2000000000 HC ICU R&B

## 2022-01-01 PROCEDURE — 71275 CT ANGIOGRAPHY CHEST: CPT

## 2022-01-01 PROCEDURE — 83735 ASSAY OF MAGNESIUM: CPT

## 2022-01-01 PROCEDURE — 6360000002 HC RX W HCPCS: Performed by: HOSPITALIST

## 2022-01-01 PROCEDURE — 5A1955Z RESPIRATORY VENTILATION, GREATER THAN 96 CONSECUTIVE HOURS: ICD-10-PCS | Performed by: ANESTHESIOLOGY

## 2022-01-01 PROCEDURE — 1200000000 HC SEMI PRIVATE

## 2022-01-01 PROCEDURE — 87186 SC STD MICRODIL/AGAR DIL: CPT

## 2022-01-01 PROCEDURE — 87077 CULTURE AEROBIC IDENTIFY: CPT

## 2022-01-01 PROCEDURE — 02HV33Z INSERTION OF INFUSION DEVICE INTO SUPERIOR VENA CAVA, PERCUTANEOUS APPROACH: ICD-10-PCS | Performed by: STUDENT IN AN ORGANIZED HEALTH CARE EDUCATION/TRAINING PROGRAM

## 2022-01-01 PROCEDURE — 99285 EMERGENCY DEPT VISIT HI MDM: CPT

## 2022-01-01 PROCEDURE — 36591 DRAW BLOOD OFF VENOUS DEVICE: CPT

## 2022-01-01 PROCEDURE — 2580000003 HC RX 258: Performed by: HOSPITALIST

## 2022-01-01 PROCEDURE — 80048 BASIC METABOLIC PNL TOTAL CA: CPT

## 2022-01-01 PROCEDURE — 2500000003 HC RX 250 WO HCPCS: Performed by: INTERNAL MEDICINE

## 2022-01-01 PROCEDURE — 87305 ASPERGILLUS AG IA: CPT

## 2022-01-01 PROCEDURE — 95819 EEG AWAKE AND ASLEEP: CPT

## 2022-01-01 PROCEDURE — 83880 ASSAY OF NATRIURETIC PEPTIDE: CPT

## 2022-01-01 PROCEDURE — 6370000000 HC RX 637 (ALT 250 FOR IP): Performed by: STUDENT IN AN ORGANIZED HEALTH CARE EDUCATION/TRAINING PROGRAM

## 2022-01-01 PROCEDURE — 87449 NOS EACH ORGANISM AG IA: CPT

## 2022-01-01 PROCEDURE — 83605 ASSAY OF LACTIC ACID: CPT

## 2022-01-01 PROCEDURE — 99255 IP/OBS CONSLTJ NEW/EST HI 80: CPT | Performed by: INTERNAL MEDICINE

## 2022-01-01 PROCEDURE — 2709999900 HC NON-CHARGEABLE SUPPLY

## 2022-01-01 PROCEDURE — 31500 INSERT EMERGENCY AIRWAY: CPT | Performed by: NURSE ANESTHETIST, CERTIFIED REGISTERED

## 2022-01-01 PROCEDURE — 94660 CPAP INITIATION&MGMT: CPT

## 2022-01-01 PROCEDURE — 2500000003 HC RX 250 WO HCPCS: Performed by: HOSPITALIST

## 2022-01-01 PROCEDURE — 31500 INSERT EMERGENCY AIRWAY: CPT | Performed by: ANESTHESIOLOGY

## 2022-01-01 PROCEDURE — 6360000004 HC RX CONTRAST MEDICATION: Performed by: EMERGENCY MEDICINE

## 2022-01-01 PROCEDURE — 87070 CULTURE OTHR SPECIMN AEROBIC: CPT

## 2022-01-01 PROCEDURE — 93005 ELECTROCARDIOGRAM TRACING: CPT | Performed by: EMERGENCY MEDICINE

## 2022-01-01 PROCEDURE — 82805 BLOOD GASES W/O2 SATURATION: CPT

## 2022-01-01 PROCEDURE — 94002 VENT MGMT INPAT INIT DAY: CPT

## 2022-01-01 PROCEDURE — 84484 ASSAY OF TROPONIN QUANT: CPT

## 2022-01-01 PROCEDURE — 2580000003 HC RX 258: Performed by: EMERGENCY MEDICINE

## 2022-01-01 PROCEDURE — 81001 URINALYSIS AUTO W/SCOPE: CPT

## 2022-01-01 PROCEDURE — 85014 HEMATOCRIT: CPT

## 2022-01-01 PROCEDURE — 99222 1ST HOSP IP/OBS MODERATE 55: CPT | Performed by: INTERNAL MEDICINE

## 2022-01-01 PROCEDURE — 31500 INSERT EMERGENCY AIRWAY: CPT

## 2022-01-01 PROCEDURE — 87205 SMEAR GRAM STAIN: CPT

## 2022-01-01 PROCEDURE — 82728 ASSAY OF FERRITIN: CPT

## 2022-01-01 PROCEDURE — 0BH18EZ INSERTION OF ENDOTRACHEAL AIRWAY INTO TRACHEA, VIA NATURAL OR ARTIFICIAL OPENING ENDOSCOPIC: ICD-10-PCS | Performed by: ANESTHESIOLOGY

## 2022-01-01 PROCEDURE — 36569 INSJ PICC 5 YR+ W/O IMAGING: CPT

## 2022-01-01 PROCEDURE — 36415 COLL VENOUS BLD VENIPUNCTURE: CPT

## 2022-01-01 PROCEDURE — 85018 HEMOGLOBIN: CPT

## 2022-01-01 PROCEDURE — C1751 CATH, INF, PER/CENT/MIDLINE: HCPCS

## 2022-01-01 PROCEDURE — 87081 CULTURE SCREEN ONLY: CPT

## 2022-01-01 PROCEDURE — 99223 1ST HOSP IP/OBS HIGH 75: CPT | Performed by: PSYCHIATRY & NEUROLOGY

## 2022-01-01 PROCEDURE — XW0DXM6 INTRODUCTION OF BARICITINIB INTO MOUTH AND PHARYNX, EXTERNAL APPROACH, NEW TECHNOLOGY GROUP 6: ICD-10-PCS | Performed by: INTERNAL MEDICINE

## 2022-01-01 PROCEDURE — 3E0333Z INTRODUCTION OF ANTI-INFLAMMATORY INTO PERIPHERAL VEIN, PERCUTANEOUS APPROACH: ICD-10-PCS | Performed by: EMERGENCY MEDICINE

## 2022-01-01 PROCEDURE — P9045 ALBUMIN (HUMAN), 5%, 250 ML: HCPCS | Performed by: INTERNAL MEDICINE

## 2022-01-01 PROCEDURE — 0202U NFCT DS 22 TRGT SARS-COV-2: CPT

## 2022-01-01 PROCEDURE — 2500000003 HC RX 250 WO HCPCS: Performed by: NURSE ANESTHETIST, CERTIFIED REGISTERED

## 2022-01-01 PROCEDURE — 6360000002 HC RX W HCPCS: Performed by: EMERGENCY MEDICINE

## 2022-01-01 PROCEDURE — 95822 EEG COMA OR SLEEP ONLY: CPT | Performed by: STUDENT IN AN ORGANIZED HEALTH CARE EDUCATION/TRAINING PROGRAM

## 2022-01-01 RX ORDER — FUROSEMIDE 10 MG/ML
INJECTION INTRAMUSCULAR; INTRAVENOUS
Status: COMPLETED
Start: 2022-01-01 | End: 2022-01-01

## 2022-01-01 RX ORDER — LORAZEPAM 2 MG/ML
1 INJECTION INTRAMUSCULAR EVERY 6 HOURS PRN
Status: DISCONTINUED | OUTPATIENT
Start: 2022-01-01 | End: 2022-01-01 | Stop reason: HOSPADM

## 2022-01-01 RX ORDER — ROCURONIUM BROMIDE 10 MG/ML
INJECTION, SOLUTION INTRAVENOUS PRN
Status: DISCONTINUED | OUTPATIENT
Start: 2022-01-01 | End: 2022-01-01 | Stop reason: HOSPADM

## 2022-01-01 RX ORDER — ONDANSETRON 2 MG/ML
4 INJECTION INTRAMUSCULAR; INTRAVENOUS EVERY 6 HOURS PRN
Status: DISCONTINUED | OUTPATIENT
Start: 2022-01-01 | End: 2022-01-01 | Stop reason: HOSPADM

## 2022-01-01 RX ORDER — SODIUM CHLORIDE 0.9 % (FLUSH) 0.9 %
5-40 SYRINGE (ML) INJECTION EVERY 12 HOURS SCHEDULED
Status: DISCONTINUED | OUTPATIENT
Start: 2022-01-01 | End: 2022-01-01 | Stop reason: HOSPADM

## 2022-01-01 RX ORDER — LANOLIN ALCOHOL/MO/W.PET/CERES
3 CREAM (GRAM) TOPICAL NIGHTLY
Status: DISCONTINUED | OUTPATIENT
Start: 2022-01-01 | End: 2022-01-01

## 2022-01-01 RX ORDER — TOPIRAMATE 100 MG/1
350 TABLET, FILM COATED ORAL 2 TIMES DAILY
Status: DISCONTINUED | OUTPATIENT
Start: 2022-01-01 | End: 2022-01-01 | Stop reason: HOSPADM

## 2022-01-01 RX ORDER — LAMOTRIGINE 100 MG/1
400 TABLET ORAL 2 TIMES DAILY
Status: DISCONTINUED | OUTPATIENT
Start: 2022-01-01 | End: 2022-01-01 | Stop reason: HOSPADM

## 2022-01-01 RX ORDER — CHLORHEXIDINE GLUCONATE 0.12 MG/ML
15 RINSE ORAL 2 TIMES DAILY
Status: DISCONTINUED | OUTPATIENT
Start: 2022-01-01 | End: 2022-01-01 | Stop reason: HOSPADM

## 2022-01-01 RX ORDER — ALBUMIN, HUMAN INJ 5% 5 %
25 SOLUTION INTRAVENOUS ONCE
Status: COMPLETED | OUTPATIENT
Start: 2022-01-01 | End: 2022-01-01

## 2022-01-01 RX ORDER — POTASSIUM CHLORIDE 7.45 MG/ML
10 INJECTION INTRAVENOUS PRN
Status: DISCONTINUED | OUTPATIENT
Start: 2022-01-01 | End: 2022-01-01 | Stop reason: HOSPADM

## 2022-01-01 RX ORDER — IPRATROPIUM BROMIDE AND ALBUTEROL SULFATE 2.5; .5 MG/3ML; MG/3ML
1 SOLUTION RESPIRATORY (INHALATION)
Status: DISCONTINUED | OUTPATIENT
Start: 2022-01-01 | End: 2022-01-01

## 2022-01-01 RX ORDER — CLONAZEPAM 0.5 MG/1
0.5 TABLET ORAL 2 TIMES DAILY PRN
Status: DISCONTINUED | OUTPATIENT
Start: 2022-01-01 | End: 2022-01-01 | Stop reason: HOSPADM

## 2022-01-01 RX ORDER — MELATONIN
1000 DAILY
Qty: 30 TABLET | Refills: 0 | Status: SHIPPED | OUTPATIENT
Start: 2022-01-01

## 2022-01-01 RX ORDER — LANOLIN ALCOHOL/MO/W.PET/CERES
3 CREAM (GRAM) TOPICAL NIGHTLY
Qty: 14 TABLET | Refills: 0 | Status: SHIPPED | OUTPATIENT
Start: 2022-01-01 | End: 2022-01-01

## 2022-01-01 RX ORDER — SODIUM CHLORIDE 9 MG/ML
25 INJECTION, SOLUTION INTRAVENOUS PRN
Status: DISCONTINUED | OUTPATIENT
Start: 2022-01-01 | End: 2022-01-01 | Stop reason: HOSPADM

## 2022-01-01 RX ORDER — TOBRAMYCIN INHALATION SOLUTION 300 MG/5ML
300 INHALANT RESPIRATORY (INHALATION) 2 TIMES DAILY
Status: DISCONTINUED | OUTPATIENT
Start: 2022-01-01 | End: 2022-01-01

## 2022-01-01 RX ORDER — DILTIAZEM HYDROCHLORIDE 120 MG/1
120 CAPSULE, COATED, EXTENDED RELEASE ORAL DAILY
Status: DISCONTINUED | OUTPATIENT
Start: 2022-01-01 | End: 2022-01-01

## 2022-01-01 RX ORDER — IPRATROPIUM BROMIDE AND ALBUTEROL SULFATE 2.5; .5 MG/3ML; MG/3ML
SOLUTION RESPIRATORY (INHALATION)
Status: DISPENSED
Start: 2022-01-01 | End: 2022-01-01

## 2022-01-01 RX ORDER — GLYCOPYRROLATE 1 MG/5 ML
0.2 SYRINGE (ML) INTRAVENOUS EVERY 4 HOURS PRN
Status: DISCONTINUED | OUTPATIENT
Start: 2022-01-01 | End: 2022-01-01 | Stop reason: HOSPADM

## 2022-01-01 RX ORDER — NICOTINE POLACRILEX 4 MG
15 LOZENGE BUCCAL PRN
Status: DISCONTINUED | OUTPATIENT
Start: 2022-01-01 | End: 2022-01-01 | Stop reason: HOSPADM

## 2022-01-01 RX ORDER — DEXTROSE MONOHYDRATE 50 MG/ML
100 INJECTION, SOLUTION INTRAVENOUS PRN
Status: DISCONTINUED | OUTPATIENT
Start: 2022-01-01 | End: 2022-01-01 | Stop reason: HOSPADM

## 2022-01-01 RX ORDER — MORPHINE SULFATE 2 MG/ML
2 INJECTION, SOLUTION INTRAMUSCULAR; INTRAVENOUS
Status: DISCONTINUED | OUTPATIENT
Start: 2022-01-01 | End: 2022-01-01 | Stop reason: HOSPADM

## 2022-01-01 RX ORDER — ASCORBIC ACID 500 MG
500 TABLET ORAL 2 TIMES DAILY
Qty: 28 TABLET | Refills: 0 | Status: SHIPPED | OUTPATIENT
Start: 2022-01-01 | End: 2022-01-01

## 2022-01-01 RX ORDER — LIDOCAINE HYDROCHLORIDE 10 MG/ML
5 INJECTION, SOLUTION EPIDURAL; INFILTRATION; INTRACAUDAL; PERINEURAL ONCE
Status: DISCONTINUED | OUTPATIENT
Start: 2022-01-01 | End: 2022-01-01 | Stop reason: HOSPADM

## 2022-01-01 RX ORDER — NOREPINEPHRINE BIT/0.9 % NACL 16MG/250ML
2-100 INFUSION BOTTLE (ML) INTRAVENOUS CONTINUOUS
Status: DISCONTINUED | OUTPATIENT
Start: 2022-01-01 | End: 2022-01-01 | Stop reason: HOSPADM

## 2022-01-01 RX ORDER — FENTANYL CITRATE-0.9 % NACL/PF 10 MCG/ML
12.5-2 PLASTIC BAG, INJECTION (ML) INTRAVENOUS CONTINUOUS
Status: DISCONTINUED | OUTPATIENT
Start: 2022-01-01 | End: 2022-01-01 | Stop reason: HOSPADM

## 2022-01-01 RX ORDER — ASCORBIC ACID 500 MG
500 TABLET ORAL 2 TIMES DAILY
Status: DISCONTINUED | OUTPATIENT
Start: 2022-01-01 | End: 2022-01-01 | Stop reason: HOSPADM

## 2022-01-01 RX ORDER — METHYLPREDNISOLONE SODIUM SUCCINATE 125 MG/2ML
125 INJECTION, POWDER, LYOPHILIZED, FOR SOLUTION INTRAMUSCULAR; INTRAVENOUS ONCE
Status: COMPLETED | OUTPATIENT
Start: 2022-01-01 | End: 2022-01-01

## 2022-01-01 RX ORDER — PROPOFOL 10 MG/ML
INJECTION, EMULSION INTRAVENOUS PRN
Status: DISCONTINUED | OUTPATIENT
Start: 2022-01-01 | End: 2022-01-01 | Stop reason: HOSPADM

## 2022-01-01 RX ORDER — MELATONIN
1000 DAILY
Status: DISCONTINUED | OUTPATIENT
Start: 2022-01-01 | End: 2022-01-01 | Stop reason: ALTCHOICE

## 2022-01-01 RX ORDER — ALBUTEROL SULFATE 90 UG/1
2 AEROSOL, METERED RESPIRATORY (INHALATION) 4 TIMES DAILY PRN
Status: DISCONTINUED | OUTPATIENT
Start: 2022-01-01 | End: 2022-01-01

## 2022-01-01 RX ORDER — PROPOFOL 10 MG/ML
5-80 INJECTION, EMULSION INTRAVENOUS CONTINUOUS
Status: DISCONTINUED | OUTPATIENT
Start: 2022-01-01 | End: 2022-01-01 | Stop reason: HOSPADM

## 2022-01-01 RX ORDER — POLYETHYLENE GLYCOL 3350 17 G/17G
17 POWDER, FOR SOLUTION ORAL DAILY PRN
Status: DISCONTINUED | OUTPATIENT
Start: 2022-01-01 | End: 2022-01-01 | Stop reason: HOSPADM

## 2022-01-01 RX ORDER — MORPHINE SULFATE 4 MG/ML
4 INJECTION, SOLUTION INTRAMUSCULAR; INTRAVENOUS
Status: DISCONTINUED | OUTPATIENT
Start: 2022-01-01 | End: 2022-01-01 | Stop reason: HOSPADM

## 2022-01-01 RX ORDER — ATORVASTATIN CALCIUM 80 MG/1
80 TABLET, FILM COATED ORAL NIGHTLY
Status: DISCONTINUED | OUTPATIENT
Start: 2022-01-01 | End: 2022-01-01 | Stop reason: HOSPADM

## 2022-01-01 RX ORDER — SODIUM CHLORIDE 0.9 % (FLUSH) 0.9 %
5-40 SYRINGE (ML) INJECTION PRN
Status: DISCONTINUED | OUTPATIENT
Start: 2022-01-01 | End: 2022-01-01

## 2022-01-01 RX ORDER — ALBUTEROL SULFATE 90 UG/1
2 AEROSOL, METERED RESPIRATORY (INHALATION)
Status: DISCONTINUED | OUTPATIENT
Start: 2022-01-01 | End: 2022-01-01

## 2022-01-01 RX ORDER — CLOBAZAM 10 MG/1
20 TABLET ORAL 2 TIMES DAILY
Status: DISCONTINUED | OUTPATIENT
Start: 2022-01-01 | End: 2022-01-01 | Stop reason: HOSPADM

## 2022-01-01 RX ORDER — SODIUM CHLORIDE, SODIUM LACTATE, POTASSIUM CHLORIDE, CALCIUM CHLORIDE 600; 310; 30; 20 MG/100ML; MG/100ML; MG/100ML; MG/100ML
INJECTION, SOLUTION INTRAVENOUS CONTINUOUS
Status: DISCONTINUED | OUTPATIENT
Start: 2022-01-01 | End: 2022-01-01

## 2022-01-01 RX ORDER — SODIUM CHLORIDE 0.9 % (FLUSH) 0.9 %
5-40 SYRINGE (ML) INJECTION PRN
Status: DISCONTINUED | OUTPATIENT
Start: 2022-01-01 | End: 2022-01-01 | Stop reason: HOSPADM

## 2022-01-01 RX ORDER — MIDODRINE HYDROCHLORIDE 5 MG/1
10 TABLET ORAL
Status: DISCONTINUED | OUTPATIENT
Start: 2022-01-01 | End: 2022-01-01 | Stop reason: HOSPADM

## 2022-01-01 RX ORDER — NOREPINEPHRINE BIT/0.9 % NACL 16MG/250ML
2-100 INFUSION BOTTLE (ML) INTRAVENOUS CONTINUOUS
Status: DISCONTINUED | OUTPATIENT
Start: 2022-01-01 | End: 2022-01-01

## 2022-01-01 RX ORDER — SODIUM CHLORIDE 0.9 % (FLUSH) 0.9 %
5-40 SYRINGE (ML) INJECTION EVERY 12 HOURS SCHEDULED
Status: DISCONTINUED | OUTPATIENT
Start: 2022-01-01 | End: 2022-01-01

## 2022-01-01 RX ORDER — TOPIRAMATE 100 MG/1
200 TABLET, FILM COATED ORAL 2 TIMES DAILY
Status: DISCONTINUED | OUTPATIENT
Start: 2022-01-01 | End: 2022-01-01

## 2022-01-01 RX ORDER — ACETYLCYSTEINE 200 MG/ML
2 SOLUTION ORAL; RESPIRATORY (INHALATION) 3 TIMES DAILY
Status: DISCONTINUED | OUTPATIENT
Start: 2022-01-01 | End: 2022-01-01

## 2022-01-01 RX ORDER — DEXTROSE MONOHYDRATE 25 G/50ML
12.5 INJECTION, SOLUTION INTRAVENOUS PRN
Status: DISCONTINUED | OUTPATIENT
Start: 2022-01-01 | End: 2022-01-01 | Stop reason: HOSPADM

## 2022-01-01 RX ORDER — PROPOFOL 10 MG/ML
INJECTION, EMULSION INTRAVENOUS
Status: COMPLETED
Start: 2022-01-01 | End: 2022-01-01

## 2022-01-01 RX ORDER — DEXAMETHASONE SODIUM PHOSPHATE 10 MG/ML
6 INJECTION, SOLUTION INTRAMUSCULAR; INTRAVENOUS DAILY
Status: DISCONTINUED | OUTPATIENT
Start: 2022-01-01 | End: 2022-01-01

## 2022-01-01 RX ORDER — METHYLPREDNISOLONE SODIUM SUCCINATE 40 MG/ML
40 INJECTION, POWDER, LYOPHILIZED, FOR SOLUTION INTRAMUSCULAR; INTRAVENOUS EVERY 8 HOURS
Status: DISCONTINUED | OUTPATIENT
Start: 2022-01-01 | End: 2022-01-01

## 2022-01-01 RX ORDER — VITAMIN B COMPLEX
2000 TABLET ORAL DAILY
Status: DISCONTINUED | OUTPATIENT
Start: 2022-01-01 | End: 2022-01-01 | Stop reason: HOSPADM

## 2022-01-01 RX ORDER — KETOROLAC TROMETHAMINE 30 MG/ML
15 INJECTION, SOLUTION INTRAMUSCULAR; INTRAVENOUS ONCE
Status: COMPLETED | OUTPATIENT
Start: 2022-01-01 | End: 2022-01-01

## 2022-01-01 RX ORDER — ACETAMINOPHEN 325 MG/1
650 TABLET ORAL EVERY 6 HOURS PRN
Status: DISCONTINUED | OUTPATIENT
Start: 2022-01-01 | End: 2022-01-01 | Stop reason: HOSPADM

## 2022-01-01 RX ORDER — GUAIFENESIN 600 MG/1
600 TABLET, EXTENDED RELEASE ORAL 2 TIMES DAILY
Qty: 20 TABLET | Refills: 0 | Status: SHIPPED | OUTPATIENT
Start: 2022-01-01 | End: 2022-01-01

## 2022-01-01 RX ORDER — DEXAMETHASONE SODIUM PHOSPHATE 10 MG/ML
10 INJECTION, SOLUTION INTRAMUSCULAR; INTRAVENOUS ONCE
Status: COMPLETED | OUTPATIENT
Start: 2022-01-01 | End: 2022-01-01

## 2022-01-01 RX ORDER — VITAMIN B COMPLEX
6000 TABLET ORAL DAILY
Status: DISPENSED | OUTPATIENT
Start: 2022-01-01 | End: 2022-01-01

## 2022-01-01 RX ORDER — PANTOPRAZOLE SODIUM 40 MG/10ML
40 INJECTION, POWDER, LYOPHILIZED, FOR SOLUTION INTRAVENOUS DAILY
Status: DISCONTINUED | OUTPATIENT
Start: 2022-01-01 | End: 2022-01-01 | Stop reason: HOSPADM

## 2022-01-01 RX ORDER — DEXAMETHASONE 4 MG/1
6 TABLET ORAL DAILY
Status: DISCONTINUED | OUTPATIENT
Start: 2022-01-01 | End: 2022-01-01

## 2022-01-01 RX ORDER — ALBUTEROL SULFATE 90 UG/1
2 AEROSOL, METERED RESPIRATORY (INHALATION) 4 TIMES DAILY PRN
Qty: 18 G | Refills: 0 | Status: SHIPPED | OUTPATIENT
Start: 2022-01-01

## 2022-01-01 RX ORDER — 0.9 % SODIUM CHLORIDE 0.9 %
1000 INTRAVENOUS SOLUTION INTRAVENOUS ONCE
Status: COMPLETED | OUTPATIENT
Start: 2022-01-01 | End: 2022-01-01

## 2022-01-01 RX ORDER — MIDODRINE HYDROCHLORIDE 5 MG/1
5 TABLET ORAL
Status: DISCONTINUED | OUTPATIENT
Start: 2022-01-01 | End: 2022-01-01

## 2022-01-01 RX ORDER — SODIUM CHLORIDE 9 MG/ML
25 INJECTION, SOLUTION INTRAVENOUS PRN
Status: DISCONTINUED | OUTPATIENT
Start: 2022-01-01 | End: 2022-01-01

## 2022-01-01 RX ORDER — ROCURONIUM BROMIDE 10 MG/ML
1 INJECTION, SOLUTION INTRAVENOUS ONCE
Status: DISCONTINUED | OUTPATIENT
Start: 2022-01-01 | End: 2022-01-01 | Stop reason: HOSPADM

## 2022-01-01 RX ORDER — DILTIAZEM HYDROCHLORIDE 5 MG/ML
5 INJECTION INTRAVENOUS ONCE
Status: COMPLETED | OUTPATIENT
Start: 2022-01-01 | End: 2022-01-01

## 2022-01-01 RX ORDER — MIDAZOLAM HYDROCHLORIDE 1 MG/ML
INJECTION INTRAMUSCULAR; INTRAVENOUS PRN
Status: DISCONTINUED | OUTPATIENT
Start: 2022-01-01 | End: 2022-01-01 | Stop reason: HOSPADM

## 2022-01-01 RX ORDER — CLOBAZAM 20 MG/1
20 TABLET ORAL 2 TIMES DAILY
Status: DISCONTINUED | OUTPATIENT
Start: 2022-01-01 | End: 2022-01-01

## 2022-01-01 RX ORDER — GUAIFENESIN 600 MG/1
600 TABLET, EXTENDED RELEASE ORAL 2 TIMES DAILY
Status: DISCONTINUED | OUTPATIENT
Start: 2022-01-01 | End: 2022-01-01 | Stop reason: HOSPADM

## 2022-01-01 RX ORDER — SODIUM CHLORIDE 450 MG/100ML
INJECTION, SOLUTION INTRAVENOUS CONTINUOUS
Status: DISCONTINUED | OUTPATIENT
Start: 2022-01-01 | End: 2022-01-01

## 2022-01-01 RX ORDER — DILTIAZEM HYDROCHLORIDE 5 MG/ML
10 INJECTION INTRAVENOUS ONCE
Status: DISCONTINUED | OUTPATIENT
Start: 2022-01-01 | End: 2022-01-01

## 2022-01-01 RX ORDER — CALCIUM GLUCONATE 20 MG/ML
1000 INJECTION, SOLUTION INTRAVENOUS ONCE
Status: COMPLETED | OUTPATIENT
Start: 2022-01-01 | End: 2022-01-01

## 2022-01-01 RX ORDER — ACETAMINOPHEN 650 MG/1
650 SUPPOSITORY RECTAL EVERY 6 HOURS PRN
Status: DISCONTINUED | OUTPATIENT
Start: 2022-01-01 | End: 2022-01-01 | Stop reason: HOSPADM

## 2022-01-01 RX ORDER — POTASSIUM CHLORIDE 20 MEQ/1
40 TABLET, EXTENDED RELEASE ORAL PRN
Status: DISCONTINUED | OUTPATIENT
Start: 2022-01-01 | End: 2022-01-01 | Stop reason: HOSPADM

## 2022-01-01 RX ORDER — LAMOTRIGINE 200 MG/1
400 TABLET, EXTENDED RELEASE ORAL 2 TIMES DAILY
COMMUNITY

## 2022-01-01 RX ORDER — LORAZEPAM 2 MG/ML
1 INJECTION INTRAMUSCULAR EVERY 4 HOURS PRN
Status: DISCONTINUED | OUTPATIENT
Start: 2022-01-01 | End: 2022-01-01 | Stop reason: HOSPADM

## 2022-01-01 RX ORDER — ONDANSETRON 4 MG/1
4 TABLET, ORALLY DISINTEGRATING ORAL EVERY 8 HOURS PRN
Status: DISCONTINUED | OUTPATIENT
Start: 2022-01-01 | End: 2022-01-01 | Stop reason: HOSPADM

## 2022-01-01 RX ORDER — METHYLPREDNISOLONE SODIUM SUCCINATE 125 MG/2ML
60 INJECTION, POWDER, LYOPHILIZED, FOR SOLUTION INTRAMUSCULAR; INTRAVENOUS EVERY 6 HOURS
Status: DISCONTINUED | OUTPATIENT
Start: 2022-01-01 | End: 2022-01-01 | Stop reason: HOSPADM

## 2022-01-01 RX ADMIN — IPRATROPIUM BROMIDE 2 PUFF: 17 AEROSOL, METERED RESPIRATORY (INHALATION) at 07:38

## 2022-01-01 RX ADMIN — BARICITINIB 4 MG: 2 TABLET, FILM COATED ORAL at 20:55

## 2022-01-01 RX ADMIN — MEROPENEM 1000 MG: 1 INJECTION INTRAVENOUS at 12:12

## 2022-01-01 RX ADMIN — ALBUTEROL SULFATE 2 PUFF: 90 AEROSOL, METERED RESPIRATORY (INHALATION) at 07:14

## 2022-01-01 RX ADMIN — Medication 500 MG: at 21:17

## 2022-01-01 RX ADMIN — IPRATROPIUM BROMIDE 2 PUFF: 17 AEROSOL, METERED RESPIRATORY (INHALATION) at 19:32

## 2022-01-01 RX ADMIN — FAMOTIDINE 20 MG: 10 INJECTION, SOLUTION INTRAVENOUS at 20:16

## 2022-01-01 RX ADMIN — SODIUM CHLORIDE, PRESERVATIVE FREE 10 ML: 5 INJECTION INTRAVENOUS at 09:54

## 2022-01-01 RX ADMIN — LAMOTRIGINE 150 MG: 100 TABLET ORAL at 22:23

## 2022-01-01 RX ADMIN — BARICITINIB 4 MG: 2 TABLET, FILM COATED ORAL at 08:11

## 2022-01-01 RX ADMIN — IPRATROPIUM BROMIDE 2 PUFF: 17 AEROSOL, METERED RESPIRATORY (INHALATION) at 19:03

## 2022-01-01 RX ADMIN — Medication 500 MG: at 09:44

## 2022-01-01 RX ADMIN — METHYLPREDNISOLONE SODIUM SUCCINATE 60 MG: 125 INJECTION, POWDER, FOR SOLUTION INTRAMUSCULAR; INTRAVENOUS at 12:21

## 2022-01-01 RX ADMIN — IPRATROPIUM BROMIDE 2 PUFF: 17 AEROSOL, METERED RESPIRATORY (INHALATION) at 07:21

## 2022-01-01 RX ADMIN — IPRATROPIUM BROMIDE 2 PUFF: 17 AEROSOL, METERED RESPIRATORY (INHALATION) at 11:54

## 2022-01-01 RX ADMIN — LAMOTRIGINE 150 MG: 100 TABLET ORAL at 21:34

## 2022-01-01 RX ADMIN — Medication 113 MCG/HR: at 19:23

## 2022-01-01 RX ADMIN — Medication 2000 UNITS: at 08:39

## 2022-01-01 RX ADMIN — ACETYLCYSTEINE 400 MG: 200 SOLUTION ORAL; RESPIRATORY (INHALATION) at 17:23

## 2022-01-01 RX ADMIN — INSULIN LISPRO 2 UNITS: 100 INJECTION, SOLUTION INTRAVENOUS; SUBCUTANEOUS at 08:56

## 2022-01-01 RX ADMIN — Medication 500 MG: at 21:04

## 2022-01-01 RX ADMIN — TOBRAMYCIN 300 MG: 300 SOLUTION ORAL at 20:03

## 2022-01-01 RX ADMIN — Medication 500 MG: at 22:24

## 2022-01-01 RX ADMIN — DILTIAZEM HYDROCHLORIDE 30 MG: 30 TABLET, FILM COATED ORAL at 06:09

## 2022-01-01 RX ADMIN — PROPOFOL 35 MCG/KG/MIN: 10 INJECTION, EMULSION INTRAVENOUS at 08:38

## 2022-01-01 RX ADMIN — LAMOTRIGINE 150 MG: 100 TABLET ORAL at 21:30

## 2022-01-01 RX ADMIN — IPRATROPIUM BROMIDE 2 PUFF: 17 AEROSOL, METERED RESPIRATORY (INHALATION) at 10:52

## 2022-01-01 RX ADMIN — SERTRALINE HYDROCHLORIDE 25 MG: 50 TABLET ORAL at 08:12

## 2022-01-01 RX ADMIN — PROPOFOL 35 MCG/KG/MIN: 10 INJECTION, EMULSION INTRAVENOUS at 19:12

## 2022-01-01 RX ADMIN — LAMOTRIGINE 150 MG: 100 TABLET ORAL at 08:51

## 2022-01-01 RX ADMIN — ALBUTEROL SULFATE 2 PUFF: 90 AEROSOL, METERED RESPIRATORY (INHALATION) at 15:49

## 2022-01-01 RX ADMIN — ALBUTEROL SULFATE 2 PUFF: 90 AEROSOL, METERED RESPIRATORY (INHALATION) at 15:43

## 2022-01-01 RX ADMIN — GUAIFENESIN 600 MG: 600 TABLET, EXTENDED RELEASE ORAL at 20:38

## 2022-01-01 RX ADMIN — DILTIAZEM HYDROCHLORIDE 30 MG: 30 TABLET, FILM COATED ORAL at 12:22

## 2022-01-01 RX ADMIN — ALBUTEROL SULFATE 2 PUFF: 90 AEROSOL, METERED RESPIRATORY (INHALATION) at 11:26

## 2022-01-01 RX ADMIN — PANTOPRAZOLE SODIUM 40 MG: 40 INJECTION, POWDER, FOR SOLUTION INTRAVENOUS at 13:02

## 2022-01-01 RX ADMIN — CHLORHEXIDINE GLUCONATE 0.12% ORAL RINSE 15 ML: 1.2 LIQUID ORAL at 20:37

## 2022-01-01 RX ADMIN — PROPOFOL 40 MCG/KG/MIN: 10 INJECTION, EMULSION INTRAVENOUS at 17:40

## 2022-01-01 RX ADMIN — TOPIRAMATE 200 MG: 100 TABLET, FILM COATED ORAL at 08:27

## 2022-01-01 RX ADMIN — FUROSEMIDE 20 MG: 10 INJECTION, SOLUTION INTRAVENOUS at 17:00

## 2022-01-01 RX ADMIN — CLOBAZAM 20 MG: 10 TABLET ORAL at 09:33

## 2022-01-01 RX ADMIN — INSULIN LISPRO 2 UNITS: 100 INJECTION, SOLUTION INTRAVENOUS; SUBCUTANEOUS at 09:16

## 2022-01-01 RX ADMIN — CHLORHEXIDINE GLUCONATE 0.12% ORAL RINSE 15 ML: 1.2 LIQUID ORAL at 21:34

## 2022-01-01 RX ADMIN — TOPIRAMATE 200 MG: 100 TABLET, FILM COATED ORAL at 22:47

## 2022-01-01 RX ADMIN — DILTIAZEM HYDROCHLORIDE 30 MG: 30 TABLET, FILM COATED ORAL at 11:12

## 2022-01-01 RX ADMIN — METHYLPREDNISOLONE SODIUM SUCCINATE 60 MG: 125 INJECTION, POWDER, FOR SOLUTION INTRAMUSCULAR; INTRAVENOUS at 18:00

## 2022-01-01 RX ADMIN — ENOXAPARIN SODIUM 80 MG: 100 INJECTION SUBCUTANEOUS at 09:36

## 2022-01-01 RX ADMIN — PROPOFOL 40 MCG/KG/MIN: 10 INJECTION, EMULSION INTRAVENOUS at 05:27

## 2022-01-01 RX ADMIN — MEROPENEM 1000 MG: 1 INJECTION INTRAVENOUS at 02:43

## 2022-01-01 RX ADMIN — FAMOTIDINE 20 MG: 10 INJECTION, SOLUTION INTRAVENOUS at 21:34

## 2022-01-01 RX ADMIN — CLOBAZAM 20 MG: 10 TABLET ORAL at 08:55

## 2022-01-01 RX ADMIN — LAMOTRIGINE 150 MG: 100 TABLET ORAL at 21:51

## 2022-01-01 RX ADMIN — GUAIFENESIN 600 MG: 600 TABLET, EXTENDED RELEASE ORAL at 21:33

## 2022-01-01 RX ADMIN — ALBUTEROL SULFATE 2 PUFF: 90 AEROSOL, METERED RESPIRATORY (INHALATION) at 19:25

## 2022-01-01 RX ADMIN — ALBUTEROL SULFATE 2 PUFF: 90 AEROSOL, METERED RESPIRATORY (INHALATION) at 11:37

## 2022-01-01 RX ADMIN — SODIUM CHLORIDE, PRESERVATIVE FREE 10 ML: 5 INJECTION INTRAVENOUS at 20:14

## 2022-01-01 RX ADMIN — METHYLPREDNISOLONE SODIUM SUCCINATE 60 MG: 125 INJECTION, POWDER, FOR SOLUTION INTRAMUSCULAR; INTRAVENOUS at 00:38

## 2022-01-01 RX ADMIN — SODIUM CHLORIDE, PRESERVATIVE FREE 10 ML: 5 INJECTION INTRAVENOUS at 09:46

## 2022-01-01 RX ADMIN — PROPOFOL 40 MCG/KG/MIN: 10 INJECTION, EMULSION INTRAVENOUS at 13:09

## 2022-01-01 RX ADMIN — TOPIRAMATE 350 MG: 100 TABLET, FILM COATED ORAL at 08:45

## 2022-01-01 RX ADMIN — CHLORHEXIDINE GLUCONATE 0.12% ORAL RINSE 15 ML: 1.2 LIQUID ORAL at 21:30

## 2022-01-01 RX ADMIN — GUAIFENESIN 600 MG: 600 TABLET, EXTENDED RELEASE ORAL at 09:35

## 2022-01-01 RX ADMIN — SODIUM CHLORIDE, PRESERVATIVE FREE 10 ML: 5 INJECTION INTRAVENOUS at 10:17

## 2022-01-01 RX ADMIN — FAMOTIDINE 20 MG: 10 INJECTION, SOLUTION INTRAVENOUS at 22:20

## 2022-01-01 RX ADMIN — MIDODRINE HYDROCHLORIDE 10 MG: 5 TABLET ORAL at 08:37

## 2022-01-01 RX ADMIN — ENOXAPARIN SODIUM 80 MG: 100 INJECTION SUBCUTANEOUS at 13:32

## 2022-01-01 RX ADMIN — PANTOPRAZOLE SODIUM 40 MG: 40 INJECTION, POWDER, FOR SOLUTION INTRAVENOUS at 08:44

## 2022-01-01 RX ADMIN — Medication 6000 UNITS: at 09:45

## 2022-01-01 RX ADMIN — PROPOFOL 50 MCG/KG/MIN: 10 INJECTION, EMULSION INTRAVENOUS at 10:11

## 2022-01-01 RX ADMIN — ACETAMINOPHEN 650 MG: 650 SUPPOSITORY RECTAL at 10:55

## 2022-01-01 RX ADMIN — POTASSIUM BICARBONATE 40 MEQ: 782 TABLET, EFFERVESCENT ORAL at 10:17

## 2022-01-01 RX ADMIN — Medication 62.5 MCG/HR: at 23:02

## 2022-01-01 RX ADMIN — DEXAMETHASONE SODIUM PHOSPHATE 6 MG: 10 INJECTION INTRAMUSCULAR; INTRAVENOUS at 09:44

## 2022-01-01 RX ADMIN — PROPOFOL 45 MCG/KG/MIN: 10 INJECTION, EMULSION INTRAVENOUS at 00:12

## 2022-01-01 RX ADMIN — PROPOFOL 50 MCG/KG/MIN: 10 INJECTION, EMULSION INTRAVENOUS at 15:54

## 2022-01-01 RX ADMIN — FAMOTIDINE 20 MG: 10 INJECTION, SOLUTION INTRAVENOUS at 09:44

## 2022-01-01 RX ADMIN — DILTIAZEM HYDROCHLORIDE 30 MG: 30 TABLET, FILM COATED ORAL at 22:23

## 2022-01-01 RX ADMIN — TOPIRAMATE 350 MG: 100 TABLET, FILM COATED ORAL at 20:44

## 2022-01-01 RX ADMIN — CHLORHEXIDINE GLUCONATE 0.12% ORAL RINSE 15 ML: 1.2 LIQUID ORAL at 08:38

## 2022-01-01 RX ADMIN — TOPIRAMATE 200 MG: 100 TABLET, FILM COATED ORAL at 09:04

## 2022-01-01 RX ADMIN — PROPOFOL 55 MCG/KG/MIN: 10 INJECTION, EMULSION INTRAVENOUS at 00:54

## 2022-01-01 RX ADMIN — SERTRALINE HYDROCHLORIDE 25 MG: 50 TABLET ORAL at 09:22

## 2022-01-01 RX ADMIN — ALBUTEROL SULFATE 2 PUFF: 90 AEROSOL, METERED RESPIRATORY (INHALATION) at 20:05

## 2022-01-01 RX ADMIN — GUAIFENESIN 600 MG: 600 TABLET, EXTENDED RELEASE ORAL at 10:16

## 2022-01-01 RX ADMIN — LAMOTRIGINE 150 MG: 100 TABLET ORAL at 09:43

## 2022-01-01 RX ADMIN — PROPOFOL 45 MCG/KG/MIN: 10 INJECTION, EMULSION INTRAVENOUS at 05:17

## 2022-01-01 RX ADMIN — PROPOFOL 50 MCG/KG/MIN: 10 INJECTION, EMULSION INTRAVENOUS at 01:41

## 2022-01-01 RX ADMIN — CHLORHEXIDINE GLUCONATE 0.12% ORAL RINSE 15 ML: 1.2 LIQUID ORAL at 09:21

## 2022-01-01 RX ADMIN — ALBUTEROL SULFATE 2 PUFF: 90 AEROSOL, METERED RESPIRATORY (INHALATION) at 15:02

## 2022-01-01 RX ADMIN — ACETAMINOPHEN 650 MG: 650 SUPPOSITORY RECTAL at 01:04

## 2022-01-01 RX ADMIN — METHYLPREDNISOLONE SODIUM SUCCINATE 60 MG: 125 INJECTION, POWDER, FOR SOLUTION INTRAMUSCULAR; INTRAVENOUS at 01:16

## 2022-01-01 RX ADMIN — SERTRALINE HYDROCHLORIDE 25 MG: 50 TABLET ORAL at 09:06

## 2022-01-01 RX ADMIN — SERTRALINE HYDROCHLORIDE 25 MG: 50 TABLET ORAL at 09:15

## 2022-01-01 RX ADMIN — ENOXAPARIN SODIUM 80 MG: 100 INJECTION SUBCUTANEOUS at 20:38

## 2022-01-01 RX ADMIN — CHLORHEXIDINE GLUCONATE 0.12% ORAL RINSE 15 ML: 1.2 LIQUID ORAL at 09:46

## 2022-01-01 RX ADMIN — PROPOFOL 35 MCG/KG/MIN: 10 INJECTION, EMULSION INTRAVENOUS at 22:46

## 2022-01-01 RX ADMIN — SODIUM CHLORIDE, PRESERVATIVE FREE 10 ML: 5 INJECTION INTRAVENOUS at 08:53

## 2022-01-01 RX ADMIN — ENOXAPARIN SODIUM 80 MG: 100 INJECTION SUBCUTANEOUS at 21:47

## 2022-01-01 RX ADMIN — IPRATROPIUM BROMIDE 2 PUFF: 17 AEROSOL, METERED RESPIRATORY (INHALATION) at 07:33

## 2022-01-01 RX ADMIN — METHYLPREDNISOLONE SODIUM SUCCINATE 60 MG: 125 INJECTION, POWDER, FOR SOLUTION INTRAMUSCULAR; INTRAVENOUS at 22:48

## 2022-01-01 RX ADMIN — IPRATROPIUM BROMIDE 2 PUFF: 17 AEROSOL, METERED RESPIRATORY (INHALATION) at 15:47

## 2022-01-01 RX ADMIN — PROPOFOL 60 MCG/KG/MIN: 10 INJECTION, EMULSION INTRAVENOUS at 17:51

## 2022-01-01 RX ADMIN — GUAIFENESIN 600 MG: 600 TABLET, EXTENDED RELEASE ORAL at 20:41

## 2022-01-01 RX ADMIN — SODIUM CHLORIDE, PRESERVATIVE FREE 10 ML: 5 INJECTION INTRAVENOUS at 21:54

## 2022-01-01 RX ADMIN — ALBUTEROL SULFATE 2 PUFF: 90 AEROSOL, METERED RESPIRATORY (INHALATION) at 21:00

## 2022-01-01 RX ADMIN — Medication 500 MG: at 20:41

## 2022-01-01 RX ADMIN — FAMOTIDINE 20 MG: 10 INJECTION, SOLUTION INTRAVENOUS at 20:38

## 2022-01-01 RX ADMIN — PROPOFOL 80 MCG/KG/MIN: 10 INJECTION, EMULSION INTRAVENOUS at 17:45

## 2022-01-01 RX ADMIN — CLOBAZAM 20 MG: 10 TABLET ORAL at 08:38

## 2022-01-01 RX ADMIN — PROPOFOL 50 MCG/KG/MIN: 10 INJECTION, EMULSION INTRAVENOUS at 09:01

## 2022-01-01 RX ADMIN — Medication 500 MG: at 08:00

## 2022-01-01 RX ADMIN — FAMOTIDINE 20 MG: 10 INJECTION, SOLUTION INTRAVENOUS at 10:00

## 2022-01-01 RX ADMIN — GUAIFENESIN 600 MG: 600 TABLET, EXTENDED RELEASE ORAL at 20:58

## 2022-01-01 RX ADMIN — PANTOPRAZOLE SODIUM 40 MG: 40 INJECTION, POWDER, FOR SOLUTION INTRAVENOUS at 09:23

## 2022-01-01 RX ADMIN — ALBUTEROL SULFATE 2 PUFF: 90 AEROSOL, METERED RESPIRATORY (INHALATION) at 08:22

## 2022-01-01 RX ADMIN — FAMOTIDINE 20 MG: 10 INJECTION, SOLUTION INTRAVENOUS at 20:44

## 2022-01-01 RX ADMIN — Medication 62.5 MCG/HR: at 04:18

## 2022-01-01 RX ADMIN — ALBUTEROL SULFATE 2 PUFF: 90 AEROSOL, METERED RESPIRATORY (INHALATION) at 10:52

## 2022-01-01 RX ADMIN — ALBUTEROL SULFATE 2 PUFF: 90 AEROSOL, METERED RESPIRATORY (INHALATION) at 15:31

## 2022-01-01 RX ADMIN — Medication 37.5 MCG/HR: at 09:19

## 2022-01-01 RX ADMIN — IPRATROPIUM BROMIDE 2 PUFF: 17 AEROSOL, METERED RESPIRATORY (INHALATION) at 10:50

## 2022-01-01 RX ADMIN — ENOXAPARIN SODIUM 30 MG: 100 INJECTION SUBCUTANEOUS at 20:38

## 2022-01-01 RX ADMIN — MIDODRINE HYDROCHLORIDE 10 MG: 5 TABLET ORAL at 11:15

## 2022-01-01 RX ADMIN — LAMOTRIGINE 150 MG: 100 TABLET ORAL at 08:43

## 2022-01-01 RX ADMIN — IPRATROPIUM BROMIDE 2 PUFF: 17 AEROSOL, METERED RESPIRATORY (INHALATION) at 15:13

## 2022-01-01 RX ADMIN — ALBUTEROL SULFATE 2 PUFF: 90 AEROSOL, METERED RESPIRATORY (INHALATION) at 14:26

## 2022-01-01 RX ADMIN — CHLORHEXIDINE GLUCONATE 0.12% ORAL RINSE 15 ML: 1.2 LIQUID ORAL at 08:11

## 2022-01-01 RX ADMIN — Medication 62.5 MCG/HR: at 22:44

## 2022-01-01 RX ADMIN — PANTOPRAZOLE SODIUM 40 MG: 40 INJECTION, POWDER, FOR SOLUTION INTRAVENOUS at 09:07

## 2022-01-01 RX ADMIN — ALBUTEROL SULFATE 2 PUFF: 90 AEROSOL, METERED RESPIRATORY (INHALATION) at 15:20

## 2022-01-01 RX ADMIN — INSULIN LISPRO 2 UNITS: 100 INJECTION, SOLUTION INTRAVENOUS; SUBCUTANEOUS at 11:54

## 2022-01-01 RX ADMIN — PROPOFOL 45 MCG/KG/MIN: 10 INJECTION, EMULSION INTRAVENOUS at 10:17

## 2022-01-01 RX ADMIN — ALBUTEROL SULFATE 2 PUFF: 90 AEROSOL, METERED RESPIRATORY (INHALATION) at 10:51

## 2022-01-01 RX ADMIN — DILTIAZEM HYDROCHLORIDE 30 MG: 30 TABLET, FILM COATED ORAL at 11:52

## 2022-01-01 RX ADMIN — SERTRALINE HYDROCHLORIDE 25 MG: 50 TABLET ORAL at 09:44

## 2022-01-01 RX ADMIN — MIDODRINE HYDROCHLORIDE 10 MG: 5 TABLET ORAL at 08:42

## 2022-01-01 RX ADMIN — LAMOTRIGINE 150 MG: 100 TABLET ORAL at 20:13

## 2022-01-01 RX ADMIN — MIDODRINE HYDROCHLORIDE 10 MG: 5 TABLET ORAL at 17:09

## 2022-01-01 RX ADMIN — TOPIRAMATE 200 MG: 100 TABLET, FILM COATED ORAL at 21:30

## 2022-01-01 RX ADMIN — PROPOFOL 45 MCG/KG/MIN: 10 INJECTION, EMULSION INTRAVENOUS at 17:35

## 2022-01-01 RX ADMIN — MIDODRINE HYDROCHLORIDE 5 MG: 5 TABLET ORAL at 17:48

## 2022-01-01 RX ADMIN — AZITHROMYCIN MONOHYDRATE 500 MG: 500 INJECTION, POWDER, LYOPHILIZED, FOR SOLUTION INTRAVENOUS at 01:57

## 2022-01-01 RX ADMIN — FLUCONAZOLE 400 MG: 200 INJECTION, SOLUTION INTRAVENOUS at 14:32

## 2022-01-01 RX ADMIN — CLOBAZAM 20 MG: 10 TABLET ORAL at 08:27

## 2022-01-01 RX ADMIN — MIDODRINE HYDROCHLORIDE 10 MG: 5 TABLET ORAL at 17:02

## 2022-01-01 RX ADMIN — FAMOTIDINE 20 MG: 10 INJECTION, SOLUTION INTRAVENOUS at 07:53

## 2022-01-01 RX ADMIN — FAMOTIDINE 20 MG: 10 INJECTION, SOLUTION INTRAVENOUS at 21:05

## 2022-01-01 RX ADMIN — LAMOTRIGINE 150 MG: 100 TABLET ORAL at 09:44

## 2022-01-01 RX ADMIN — SODIUM CHLORIDE, PRESERVATIVE FREE 10 ML: 5 INJECTION INTRAVENOUS at 20:15

## 2022-01-01 RX ADMIN — DEXAMETHASONE SODIUM PHOSPHATE 6 MG: 10 INJECTION INTRAMUSCULAR; INTRAVENOUS at 08:44

## 2022-01-01 RX ADMIN — LAMOTRIGINE 150 MG: 100 TABLET ORAL at 20:16

## 2022-01-01 RX ADMIN — LAMOTRIGINE 150 MG: 100 TABLET ORAL at 08:38

## 2022-01-01 RX ADMIN — ENOXAPARIN SODIUM 80 MG: 100 INJECTION SUBCUTANEOUS at 21:17

## 2022-01-01 RX ADMIN — ENOXAPARIN SODIUM 30 MG: 100 INJECTION SUBCUTANEOUS at 09:04

## 2022-01-01 RX ADMIN — METHYLPREDNISOLONE SODIUM SUCCINATE 60 MG: 125 INJECTION, POWDER, FOR SOLUTION INTRAMUSCULAR; INTRAVENOUS at 00:42

## 2022-01-01 RX ADMIN — Medication 500 MG: at 09:15

## 2022-01-01 RX ADMIN — ENOXAPARIN SODIUM 80 MG: 100 INJECTION SUBCUTANEOUS at 09:59

## 2022-01-01 RX ADMIN — CHLORHEXIDINE GLUCONATE 0.12% ORAL RINSE 15 ML: 1.2 LIQUID ORAL at 21:27

## 2022-01-01 RX ADMIN — Medication 87.5 MCG/HR: at 06:00

## 2022-01-01 RX ADMIN — SODIUM CHLORIDE, PRESERVATIVE FREE 10 ML: 5 INJECTION INTRAVENOUS at 09:24

## 2022-01-01 RX ADMIN — PROPOFOL 35 MCG/KG/MIN: 10 INJECTION, EMULSION INTRAVENOUS at 05:47

## 2022-01-01 RX ADMIN — MIDODRINE HYDROCHLORIDE 10 MG: 5 TABLET ORAL at 13:38

## 2022-01-01 RX ADMIN — CEFTRIAXONE SODIUM 1000 MG: 1 INJECTION, POWDER, FOR SOLUTION INTRAMUSCULAR; INTRAVENOUS at 00:55

## 2022-01-01 RX ADMIN — FAMOTIDINE 20 MG: 10 INJECTION, SOLUTION INTRAVENOUS at 21:30

## 2022-01-01 RX ADMIN — SODIUM CHLORIDE: 4.5 INJECTION, SOLUTION INTRAVENOUS at 18:19

## 2022-01-01 RX ADMIN — SERTRALINE HYDROCHLORIDE 25 MG: 50 TABLET ORAL at 09:34

## 2022-01-01 RX ADMIN — FLUCONAZOLE 400 MG: 200 INJECTION, SOLUTION INTRAVENOUS at 18:43

## 2022-01-01 RX ADMIN — ENOXAPARIN SODIUM 30 MG: 100 INJECTION SUBCUTANEOUS at 21:52

## 2022-01-01 RX ADMIN — MEROPENEM-VABORBACTAM 4 G: 1; 1 INJECTION, POWDER, FOR SOLUTION INTRAVENOUS at 20:42

## 2022-01-01 RX ADMIN — ENOXAPARIN SODIUM 30 MG: 100 INJECTION SUBCUTANEOUS at 08:51

## 2022-01-01 RX ADMIN — ALBUTEROL SULFATE 2 PUFF: 90 AEROSOL, METERED RESPIRATORY (INHALATION) at 12:49

## 2022-01-01 RX ADMIN — METHYLPREDNISOLONE SODIUM SUCCINATE 60 MG: 125 INJECTION, POWDER, FOR SOLUTION INTRAMUSCULAR; INTRAVENOUS at 13:05

## 2022-01-01 RX ADMIN — IPRATROPIUM BROMIDE 2 PUFF: 17 AEROSOL, METERED RESPIRATORY (INHALATION) at 19:28

## 2022-01-01 RX ADMIN — SODIUM CHLORIDE, PRESERVATIVE FREE 10 ML: 5 INJECTION INTRAVENOUS at 20:16

## 2022-01-01 RX ADMIN — CHLORHEXIDINE GLUCONATE 0.12% ORAL RINSE 15 ML: 1.2 LIQUID ORAL at 21:05

## 2022-01-01 RX ADMIN — SODIUM CHLORIDE, PRESERVATIVE FREE 10 ML: 5 INJECTION INTRAVENOUS at 22:44

## 2022-01-01 RX ADMIN — CLOBAZAM 20 MG: 10 TABLET ORAL at 20:16

## 2022-01-01 RX ADMIN — ALBUTEROL SULFATE 2 PUFF: 90 AEROSOL, METERED RESPIRATORY (INHALATION) at 11:08

## 2022-01-01 RX ADMIN — CLOBAZAM 20 MG: 10 TABLET ORAL at 20:37

## 2022-01-01 RX ADMIN — Medication 500 MG: at 08:53

## 2022-01-01 RX ADMIN — LAMOTRIGINE 150 MG: 100 TABLET ORAL at 20:49

## 2022-01-01 RX ADMIN — SODIUM CHLORIDE, PRESERVATIVE FREE 10 ML: 5 INJECTION INTRAVENOUS at 20:40

## 2022-01-01 RX ADMIN — IPRATROPIUM BROMIDE 2 PUFF: 17 AEROSOL, METERED RESPIRATORY (INHALATION) at 19:48

## 2022-01-01 RX ADMIN — SODIUM CHLORIDE, PRESERVATIVE FREE 10 ML: 5 INJECTION INTRAVENOUS at 20:38

## 2022-01-01 RX ADMIN — PROPOFOL 80 MCG/KG/MIN: 10 INJECTION, EMULSION INTRAVENOUS at 07:23

## 2022-01-01 RX ADMIN — ENOXAPARIN SODIUM 30 MG: 100 INJECTION SUBCUTANEOUS at 20:48

## 2022-01-01 RX ADMIN — SERTRALINE HYDROCHLORIDE 25 MG: 50 TABLET ORAL at 08:37

## 2022-01-01 RX ADMIN — ENOXAPARIN SODIUM 80 MG: 100 INJECTION SUBCUTANEOUS at 09:34

## 2022-01-01 RX ADMIN — SODIUM CHLORIDE, PRESERVATIVE FREE 10 ML: 5 INJECTION INTRAVENOUS at 22:49

## 2022-01-01 RX ADMIN — IPRATROPIUM BROMIDE 2 PUFF: 17 AEROSOL, METERED RESPIRATORY (INHALATION) at 15:07

## 2022-01-01 RX ADMIN — SODIUM CHLORIDE, PRESERVATIVE FREE 10 ML: 5 INJECTION INTRAVENOUS at 08:38

## 2022-01-01 RX ADMIN — ENOXAPARIN SODIUM 80 MG: 100 INJECTION SUBCUTANEOUS at 08:55

## 2022-01-01 RX ADMIN — TOPIRAMATE 350 MG: 100 TABLET, FILM COATED ORAL at 08:36

## 2022-01-01 RX ADMIN — PROPOFOL 43.7 MCG/KG/MIN: 10 INJECTION, EMULSION INTRAVENOUS at 16:43

## 2022-01-01 RX ADMIN — FAMOTIDINE 20 MG: 10 INJECTION, SOLUTION INTRAVENOUS at 09:07

## 2022-01-01 RX ADMIN — IPRATROPIUM BROMIDE 2 PUFF: 17 AEROSOL, METERED RESPIRATORY (INHALATION) at 11:37

## 2022-01-01 RX ADMIN — PROPOFOL 50 MCG/KG/MIN: 10 INJECTION, EMULSION INTRAVENOUS at 04:35

## 2022-01-01 RX ADMIN — Medication 62.5 MCG/HR: at 23:56

## 2022-01-01 RX ADMIN — MEROPENEM-VABORBACTAM 4 G: 1; 1 INJECTION, POWDER, FOR SOLUTION INTRAVENOUS at 04:01

## 2022-01-01 RX ADMIN — PROPOFOL 35 MCG/KG/MIN: 10 INJECTION, EMULSION INTRAVENOUS at 14:12

## 2022-01-01 RX ADMIN — ENOXAPARIN SODIUM 80 MG: 100 INJECTION SUBCUTANEOUS at 20:14

## 2022-01-01 RX ADMIN — Medication 87.5 MCG/HR: at 16:18

## 2022-01-01 RX ADMIN — PROPOFOL 40 MCG/KG/MIN: 10 INJECTION, EMULSION INTRAVENOUS at 21:36

## 2022-01-01 RX ADMIN — GUAIFENESIN 600 MG: 600 TABLET, EXTENDED RELEASE ORAL at 08:36

## 2022-01-01 RX ADMIN — DEXAMETHASONE SODIUM PHOSPHATE 6 MG: 10 INJECTION INTRAMUSCULAR; INTRAVENOUS at 08:12

## 2022-01-01 RX ADMIN — TOPIRAMATE 200 MG: 100 TABLET, FILM COATED ORAL at 08:43

## 2022-01-01 RX ADMIN — IPRATROPIUM BROMIDE 2 PUFF: 17 AEROSOL, METERED RESPIRATORY (INHALATION) at 07:08

## 2022-01-01 RX ADMIN — PROPOFOL 39.73 MCG/KG/MIN: 10 INJECTION, EMULSION INTRAVENOUS at 03:57

## 2022-01-01 RX ADMIN — TOPIRAMATE 200 MG: 100 TABLET, FILM COATED ORAL at 20:41

## 2022-01-01 RX ADMIN — CHLORHEXIDINE GLUCONATE 0.12% ORAL RINSE 15 ML: 1.2 LIQUID ORAL at 09:58

## 2022-01-01 RX ADMIN — PROPOFOL 10 MCG/KG/MIN: 10 INJECTION, EMULSION INTRAVENOUS at 00:31

## 2022-01-01 RX ADMIN — SODIUM CHLORIDE, PRESERVATIVE FREE 10 ML: 5 INJECTION INTRAVENOUS at 20:43

## 2022-01-01 RX ADMIN — AZITHROMYCIN MONOHYDRATE 500 MG: 500 INJECTION, POWDER, LYOPHILIZED, FOR SOLUTION INTRAVENOUS at 23:23

## 2022-01-01 RX ADMIN — DILTIAZEM HYDROCHLORIDE 30 MG: 30 TABLET, FILM COATED ORAL at 06:32

## 2022-01-01 RX ADMIN — ALBUTEROL SULFATE 2 PUFF: 90 AEROSOL, METERED RESPIRATORY (INHALATION) at 19:26

## 2022-01-01 RX ADMIN — PROPOFOL 35 MCG/KG/MIN: 10 INJECTION, EMULSION INTRAVENOUS at 00:06

## 2022-01-01 RX ADMIN — FLUCONAZOLE 400 MG: 200 INJECTION, SOLUTION INTRAVENOUS at 14:37

## 2022-01-01 RX ADMIN — Medication 2000 UNITS: at 09:13

## 2022-01-01 RX ADMIN — IPRATROPIUM BROMIDE 2 PUFF: 17 AEROSOL, METERED RESPIRATORY (INHALATION) at 18:53

## 2022-01-01 RX ADMIN — FAMOTIDINE 20 MG: 10 INJECTION, SOLUTION INTRAVENOUS at 08:35

## 2022-01-01 RX ADMIN — ALBUTEROL SULFATE 2 PUFF: 90 AEROSOL, METERED RESPIRATORY (INHALATION) at 08:19

## 2022-01-01 RX ADMIN — SODIUM CHLORIDE, PRESERVATIVE FREE 10 ML: 5 INJECTION INTRAVENOUS at 20:49

## 2022-01-01 RX ADMIN — SODIUM CHLORIDE: 4.5 INJECTION, SOLUTION INTRAVENOUS at 02:23

## 2022-01-01 RX ADMIN — Medication 38 MCG/HR: at 01:09

## 2022-01-01 RX ADMIN — FAMOTIDINE 20 MG: 10 INJECTION, SOLUTION INTRAVENOUS at 20:48

## 2022-01-01 RX ADMIN — METHYLPREDNISOLONE SODIUM SUCCINATE 60 MG: 125 INJECTION, POWDER, FOR SOLUTION INTRAMUSCULAR; INTRAVENOUS at 18:23

## 2022-01-01 RX ADMIN — PROPOFOL 59.99 MCG/KG/MIN: 10 INJECTION, EMULSION INTRAVENOUS at 01:00

## 2022-01-01 RX ADMIN — ALBUTEROL SULFATE 2 PUFF: 90 AEROSOL, METERED RESPIRATORY (INHALATION) at 16:15

## 2022-01-01 RX ADMIN — PROPOFOL 55 MCG/KG/MIN: 10 INJECTION, EMULSION INTRAVENOUS at 04:25

## 2022-01-01 RX ADMIN — CHLORHEXIDINE GLUCONATE 0.12% ORAL RINSE 15 ML: 1.2 LIQUID ORAL at 09:23

## 2022-01-01 RX ADMIN — IPRATROPIUM BROMIDE 2 PUFF: 17 AEROSOL, METERED RESPIRATORY (INHALATION) at 19:19

## 2022-01-01 RX ADMIN — ATORVASTATIN CALCIUM 80 MG: 80 TABLET, FILM COATED ORAL at 20:41

## 2022-01-01 RX ADMIN — METHYLPREDNISOLONE SODIUM SUCCINATE 60 MG: 125 INJECTION, POWDER, FOR SOLUTION INTRAMUSCULAR; INTRAVENOUS at 13:15

## 2022-01-01 RX ADMIN — SODIUM CHLORIDE: 4.5 INJECTION, SOLUTION INTRAVENOUS at 15:50

## 2022-01-01 RX ADMIN — DILTIAZEM HYDROCHLORIDE 30 MG: 30 TABLET, FILM COATED ORAL at 23:29

## 2022-01-01 RX ADMIN — DEXAMETHASONE SODIUM PHOSPHATE 6 MG: 10 INJECTION INTRAMUSCULAR; INTRAVENOUS at 08:00

## 2022-01-01 RX ADMIN — Medication 62.5 MCG/HR: at 19:02

## 2022-01-01 RX ADMIN — MIDODRINE HYDROCHLORIDE 10 MG: 5 TABLET ORAL at 17:47

## 2022-01-01 RX ADMIN — MIDODRINE HYDROCHLORIDE 10 MG: 5 TABLET ORAL at 10:47

## 2022-01-01 RX ADMIN — CHLORHEXIDINE GLUCONATE 0.12% ORAL RINSE 15 ML: 1.2 LIQUID ORAL at 08:35

## 2022-01-01 RX ADMIN — ALBUTEROL SULFATE 2 PUFF: 90 AEROSOL, METERED RESPIRATORY (INHALATION) at 11:12

## 2022-01-01 RX ADMIN — SODIUM CHLORIDE, PRESERVATIVE FREE 10 ML: 5 INJECTION INTRAVENOUS at 22:13

## 2022-01-01 RX ADMIN — FAMOTIDINE 20 MG: 10 INJECTION, SOLUTION INTRAVENOUS at 21:46

## 2022-01-01 RX ADMIN — PROPOFOL 59.99 MCG/KG/MIN: 10 INJECTION, EMULSION INTRAVENOUS at 05:23

## 2022-01-01 RX ADMIN — SERTRALINE HYDROCHLORIDE 25 MG: 50 TABLET ORAL at 09:08

## 2022-01-01 RX ADMIN — ENOXAPARIN SODIUM 80 MG: 100 INJECTION SUBCUTANEOUS at 08:38

## 2022-01-01 RX ADMIN — IPRATROPIUM BROMIDE 2 PUFF: 17 AEROSOL, METERED RESPIRATORY (INHALATION) at 17:10

## 2022-01-01 RX ADMIN — AZITHROMYCIN MONOHYDRATE 500 MG: 500 INJECTION, POWDER, LYOPHILIZED, FOR SOLUTION INTRAVENOUS at 01:47

## 2022-01-01 RX ADMIN — CEFTRIAXONE SODIUM 1000 MG: 1 INJECTION, POWDER, FOR SOLUTION INTRAMUSCULAR; INTRAVENOUS at 01:15

## 2022-01-01 RX ADMIN — Medication 62.5 MCG/HR: at 08:53

## 2022-01-01 RX ADMIN — SODIUM CHLORIDE, PRESERVATIVE FREE 10 ML: 5 INJECTION INTRAVENOUS at 02:22

## 2022-01-01 RX ADMIN — Medication 62.5 MCG/HR: at 11:10

## 2022-01-01 RX ADMIN — PROPOFOL 45 MCG/KG/MIN: 10 INJECTION, EMULSION INTRAVENOUS at 23:07

## 2022-01-01 RX ADMIN — VANCOMYCIN HYDROCHLORIDE 1250 MG: 5 INJECTION, POWDER, LYOPHILIZED, FOR SOLUTION INTRAVENOUS at 09:09

## 2022-01-01 RX ADMIN — Medication 3 MG: at 20:41

## 2022-01-01 RX ADMIN — GUAIFENESIN 600 MG: 600 TABLET, EXTENDED RELEASE ORAL at 20:47

## 2022-01-01 RX ADMIN — ALBUMIN (HUMAN) 25 G: 12.5 INJECTION, SOLUTION INTRAVENOUS at 13:07

## 2022-01-01 RX ADMIN — ENOXAPARIN SODIUM 80 MG: 100 INJECTION SUBCUTANEOUS at 22:48

## 2022-01-01 RX ADMIN — IPRATROPIUM BROMIDE 2 PUFF: 17 AEROSOL, METERED RESPIRATORY (INHALATION) at 20:27

## 2022-01-01 RX ADMIN — LAMOTRIGINE 150 MG: 100 TABLET ORAL at 00:52

## 2022-01-01 RX ADMIN — ALBUTEROL SULFATE 2 PUFF: 90 AEROSOL, METERED RESPIRATORY (INHALATION) at 19:18

## 2022-01-01 RX ADMIN — IPRATROPIUM BROMIDE 2 PUFF: 17 AEROSOL, METERED RESPIRATORY (INHALATION) at 12:20

## 2022-01-01 RX ADMIN — SODIUM CHLORIDE, PRESERVATIVE FREE 10 ML: 5 INJECTION INTRAVENOUS at 09:45

## 2022-01-01 RX ADMIN — IPRATROPIUM BROMIDE 2 PUFF: 17 AEROSOL, METERED RESPIRATORY (INHALATION) at 08:19

## 2022-01-01 RX ADMIN — Medication 500 MG: at 09:22

## 2022-01-01 RX ADMIN — MIDODRINE HYDROCHLORIDE 10 MG: 5 TABLET ORAL at 09:29

## 2022-01-01 RX ADMIN — GUAIFENESIN 600 MG: 600 TABLET, EXTENDED RELEASE ORAL at 20:37

## 2022-01-01 RX ADMIN — CLOBAZAM 20 MG: 10 TABLET ORAL at 09:59

## 2022-01-01 RX ADMIN — Medication 38 MCG/HR: at 05:29

## 2022-01-01 RX ADMIN — SODIUM CHLORIDE, PRESERVATIVE FREE 10 ML: 5 INJECTION INTRAVENOUS at 09:08

## 2022-01-01 RX ADMIN — Medication 163 MCG/HR: at 23:58

## 2022-01-01 RX ADMIN — CHLORHEXIDINE GLUCONATE 0.12% ORAL RINSE 15 ML: 1.2 LIQUID ORAL at 09:06

## 2022-01-01 RX ADMIN — PANTOPRAZOLE SODIUM 40 MG: 40 INJECTION, POWDER, FOR SOLUTION INTRAVENOUS at 09:15

## 2022-01-01 RX ADMIN — DILTIAZEM HYDROCHLORIDE 30 MG: 30 TABLET, FILM COATED ORAL at 18:00

## 2022-01-01 RX ADMIN — MIDODRINE HYDROCHLORIDE 10 MG: 5 TABLET ORAL at 12:18

## 2022-01-01 RX ADMIN — PROPOFOL 80 MCG/KG/MIN: 10 INJECTION, EMULSION INTRAVENOUS at 03:35

## 2022-01-01 RX ADMIN — PROPOFOL 45 MCG/KG/MIN: 10 INJECTION, EMULSION INTRAVENOUS at 05:38

## 2022-01-01 RX ADMIN — CHLORHEXIDINE GLUCONATE 0.12% ORAL RINSE 15 ML: 1.2 LIQUID ORAL at 22:20

## 2022-01-01 RX ADMIN — IPRATROPIUM BROMIDE 2 PUFF: 17 AEROSOL, METERED RESPIRATORY (INHALATION) at 07:43

## 2022-01-01 RX ADMIN — IPRATROPIUM BROMIDE 2 PUFF: 17 AEROSOL, METERED RESPIRATORY (INHALATION) at 07:19

## 2022-01-01 RX ADMIN — Medication 113 MCG/HR: at 15:03

## 2022-01-01 RX ADMIN — IPRATROPIUM BROMIDE 2 PUFF: 17 AEROSOL, METERED RESPIRATORY (INHALATION) at 19:18

## 2022-01-01 RX ADMIN — MEROPENEM 1000 MG: 1 INJECTION INTRAVENOUS at 17:54

## 2022-01-01 RX ADMIN — ENOXAPARIN SODIUM 80 MG: 100 INJECTION SUBCUTANEOUS at 07:53

## 2022-01-01 RX ADMIN — AZITHROMYCIN MONOHYDRATE 500 MG: 500 INJECTION, POWDER, LYOPHILIZED, FOR SOLUTION INTRAVENOUS at 00:51

## 2022-01-01 RX ADMIN — LAMOTRIGINE 150 MG: 100 TABLET ORAL at 09:05

## 2022-01-01 RX ADMIN — PROPOFOL 80 MCG/KG/MIN: 10 INJECTION, EMULSION INTRAVENOUS at 16:39

## 2022-01-01 RX ADMIN — MEROPENEM 1000 MG: 1 INJECTION INTRAVENOUS at 10:29

## 2022-01-01 RX ADMIN — SODIUM CHLORIDE, PRESERVATIVE FREE 10 ML: 5 INJECTION INTRAVENOUS at 22:21

## 2022-01-01 RX ADMIN — METHYLPREDNISOLONE SODIUM SUCCINATE 60 MG: 125 INJECTION, POWDER, FOR SOLUTION INTRAMUSCULAR; INTRAVENOUS at 00:49

## 2022-01-01 RX ADMIN — GUAIFENESIN 600 MG: 600 TABLET, EXTENDED RELEASE ORAL at 07:53

## 2022-01-01 RX ADMIN — IPRATROPIUM BROMIDE 2 PUFF: 17 AEROSOL, METERED RESPIRATORY (INHALATION) at 15:46

## 2022-01-01 RX ADMIN — DILTIAZEM HYDROCHLORIDE 30 MG: 30 TABLET, FILM COATED ORAL at 22:46

## 2022-01-01 RX ADMIN — LORAZEPAM 1 MG: 2 INJECTION INTRAMUSCULAR; INTRAVENOUS at 20:44

## 2022-01-01 RX ADMIN — PROPOFOL 35 MCG/KG/MIN: 10 INJECTION, EMULSION INTRAVENOUS at 17:06

## 2022-01-01 RX ADMIN — ALBUTEROL SULFATE 2 PUFF: 90 AEROSOL, METERED RESPIRATORY (INHALATION) at 15:48

## 2022-01-01 RX ADMIN — ACETYLCYSTEINE 400 MG: 200 SOLUTION ORAL; RESPIRATORY (INHALATION) at 15:51

## 2022-01-01 RX ADMIN — Medication 87.5 MCG/HR: at 06:38

## 2022-01-01 RX ADMIN — FAMOTIDINE 20 MG: 10 INJECTION, SOLUTION INTRAVENOUS at 19:56

## 2022-01-01 RX ADMIN — ALBUTEROL SULFATE 2 PUFF: 90 AEROSOL, METERED RESPIRATORY (INHALATION) at 19:19

## 2022-01-01 RX ADMIN — PROPOFOL 50 MCG/KG/MIN: 10 INJECTION, EMULSION INTRAVENOUS at 05:57

## 2022-01-01 RX ADMIN — GUAIFENESIN 600 MG: 600 TABLET, EXTENDED RELEASE ORAL at 08:11

## 2022-01-01 RX ADMIN — PROPOFOL 45 MCG/KG/MIN: 10 INJECTION, EMULSION INTRAVENOUS at 13:30

## 2022-01-01 RX ADMIN — CLOBAZAM 20 MG: 10 TABLET ORAL at 09:06

## 2022-01-01 RX ADMIN — CLOBAZAM 20 MG: 20 TABLET ORAL at 23:00

## 2022-01-01 RX ADMIN — SODIUM CHLORIDE: 4.5 INJECTION, SOLUTION INTRAVENOUS at 12:37

## 2022-01-01 RX ADMIN — IPRATROPIUM BROMIDE 2 PUFF: 17 AEROSOL, METERED RESPIRATORY (INHALATION) at 08:11

## 2022-01-01 RX ADMIN — SODIUM CHLORIDE 0.5 MCG/KG/MIN: 9 INJECTION, SOLUTION INTRAVENOUS at 18:20

## 2022-01-01 RX ADMIN — ENOXAPARIN SODIUM 80 MG: 100 INJECTION SUBCUTANEOUS at 08:45

## 2022-01-01 RX ADMIN — MEROPENEM 1000 MG: 1 INJECTION INTRAVENOUS at 02:15

## 2022-01-01 RX ADMIN — CEFTRIAXONE SODIUM 1000 MG: 1 INJECTION, POWDER, FOR SOLUTION INTRAMUSCULAR; INTRAVENOUS at 03:05

## 2022-01-01 RX ADMIN — MIDODRINE HYDROCHLORIDE 10 MG: 5 TABLET ORAL at 18:00

## 2022-01-01 RX ADMIN — Medication 500 MG: at 21:30

## 2022-01-01 RX ADMIN — IPRATROPIUM BROMIDE 2 PUFF: 17 AEROSOL, METERED RESPIRATORY (INHALATION) at 11:39

## 2022-01-01 RX ADMIN — LORAZEPAM 1 MG: 2 INJECTION INTRAMUSCULAR; INTRAVENOUS at 20:16

## 2022-01-01 RX ADMIN — PROPOFOL 70 MCG/KG/MIN: 10 INJECTION, EMULSION INTRAVENOUS at 06:15

## 2022-01-01 RX ADMIN — IPRATROPIUM BROMIDE 2 PUFF: 17 AEROSOL, METERED RESPIRATORY (INHALATION) at 20:02

## 2022-01-01 RX ADMIN — TOPIRAMATE 200 MG: 100 TABLET, FILM COATED ORAL at 21:51

## 2022-01-01 RX ADMIN — ENOXAPARIN SODIUM 80 MG: 100 INJECTION SUBCUTANEOUS at 08:28

## 2022-01-01 RX ADMIN — DILTIAZEM HYDROCHLORIDE 30 MG: 30 TABLET, FILM COATED ORAL at 17:38

## 2022-01-01 RX ADMIN — Medication 62.5 MCG/HR: at 10:05

## 2022-01-01 RX ADMIN — DILTIAZEM HYDROCHLORIDE 30 MG: 30 TABLET, FILM COATED ORAL at 12:41

## 2022-01-01 RX ADMIN — LAMOTRIGINE 150 MG: 100 TABLET ORAL at 08:45

## 2022-01-01 RX ADMIN — ENOXAPARIN SODIUM 30 MG: 100 INJECTION SUBCUTANEOUS at 19:56

## 2022-01-01 RX ADMIN — CHLORHEXIDINE GLUCONATE 0.12% ORAL RINSE 15 ML: 1.2 LIQUID ORAL at 09:15

## 2022-01-01 RX ADMIN — KETOROLAC TROMETHAMINE 15 MG: 30 INJECTION, SOLUTION INTRAMUSCULAR; INTRAVENOUS at 03:23

## 2022-01-01 RX ADMIN — LAMOTRIGINE 150 MG: 100 TABLET ORAL at 20:38

## 2022-01-01 RX ADMIN — MEROPENEM 1000 MG: 1 INJECTION INTRAVENOUS at 17:40

## 2022-01-01 RX ADMIN — BARICITINIB 4 MG: 2 TABLET, FILM COATED ORAL at 08:51

## 2022-01-01 RX ADMIN — LAMOTRIGINE 150 MG: 100 TABLET ORAL at 10:17

## 2022-01-01 RX ADMIN — PROPOFOL 45 MCG/KG/MIN: 10 INJECTION, EMULSION INTRAVENOUS at 01:57

## 2022-01-01 RX ADMIN — CLOBAZAM 20 MG: 10 TABLET ORAL at 21:28

## 2022-01-01 RX ADMIN — SODIUM CHLORIDE, PRESERVATIVE FREE 10 ML: 5 INJECTION INTRAVENOUS at 09:07

## 2022-01-01 RX ADMIN — MEROPENEM 1000 MG: 1 INJECTION INTRAVENOUS at 03:32

## 2022-01-01 RX ADMIN — FAMOTIDINE 20 MG: 10 INJECTION, SOLUTION INTRAVENOUS at 08:37

## 2022-01-01 RX ADMIN — SODIUM CHLORIDE, PRESERVATIVE FREE 10 ML: 5 INJECTION INTRAVENOUS at 19:57

## 2022-01-01 RX ADMIN — SODIUM CHLORIDE, PRESERVATIVE FREE 10 ML: 5 INJECTION INTRAVENOUS at 09:23

## 2022-01-01 RX ADMIN — ALBUTEROL SULFATE 2 PUFF: 90 AEROSOL, METERED RESPIRATORY (INHALATION) at 11:07

## 2022-01-01 RX ADMIN — ALBUTEROL SULFATE 2 PUFF: 90 AEROSOL, METERED RESPIRATORY (INHALATION) at 11:00

## 2022-01-01 RX ADMIN — Medication 500 MG: at 08:54

## 2022-01-01 RX ADMIN — GUAIFENESIN 600 MG: 600 TABLET, EXTENDED RELEASE ORAL at 08:27

## 2022-01-01 RX ADMIN — PROPOFOL 50 MCG/KG/MIN: 10 INJECTION, EMULSION INTRAVENOUS at 02:11

## 2022-01-01 RX ADMIN — FAMOTIDINE 20 MG: 10 INJECTION, SOLUTION INTRAVENOUS at 09:37

## 2022-01-01 RX ADMIN — ACETAMINOPHEN 650 MG: 650 SUPPOSITORY RECTAL at 10:17

## 2022-01-01 RX ADMIN — MEROPENEM 1000 MG: 1 INJECTION INTRAVENOUS at 02:21

## 2022-01-01 RX ADMIN — TOPIRAMATE 200 MG: 100 TABLET, FILM COATED ORAL at 21:34

## 2022-01-01 RX ADMIN — MIDODRINE HYDROCHLORIDE 10 MG: 5 TABLET ORAL at 13:15

## 2022-01-01 RX ADMIN — ENOXAPARIN SODIUM 30 MG: 100 INJECTION SUBCUTANEOUS at 20:58

## 2022-01-01 RX ADMIN — ALBUTEROL SULFATE 2 PUFF: 90 AEROSOL, METERED RESPIRATORY (INHALATION) at 12:20

## 2022-01-01 RX ADMIN — Medication 6000 UNITS: at 08:41

## 2022-01-01 RX ADMIN — CLOBAZAM 20 MG: 10 TABLET ORAL at 20:49

## 2022-01-01 RX ADMIN — ALBUTEROL SULFATE 2 PUFF: 90 AEROSOL, METERED RESPIRATORY (INHALATION) at 11:34

## 2022-01-01 RX ADMIN — IPRATROPIUM BROMIDE 2 PUFF: 17 AEROSOL, METERED RESPIRATORY (INHALATION) at 11:08

## 2022-01-01 RX ADMIN — METHYLPREDNISOLONE SODIUM SUCCINATE 60 MG: 125 INJECTION, POWDER, FOR SOLUTION INTRAMUSCULAR; INTRAVENOUS at 17:49

## 2022-01-01 RX ADMIN — SODIUM CHLORIDE: 4.5 INJECTION, SOLUTION INTRAVENOUS at 16:44

## 2022-01-01 RX ADMIN — LAMOTRIGINE 150 MG: 100 TABLET ORAL at 09:42

## 2022-01-01 RX ADMIN — CLOBAZAM 20 MG: 10 TABLET ORAL at 10:16

## 2022-01-01 RX ADMIN — PROPOFOL 55 MCG/KG/MIN: 10 INJECTION, EMULSION INTRAVENOUS at 06:39

## 2022-01-01 RX ADMIN — ALBUTEROL SULFATE 2 PUFF: 90 AEROSOL, METERED RESPIRATORY (INHALATION) at 07:36

## 2022-01-01 RX ADMIN — IPRATROPIUM BROMIDE 2 PUFF: 17 AEROSOL, METERED RESPIRATORY (INHALATION) at 07:54

## 2022-01-01 RX ADMIN — PROPOFOL 30 MCG/KG/MIN: 10 INJECTION, EMULSION INTRAVENOUS at 01:03

## 2022-01-01 RX ADMIN — ALBUTEROL SULFATE 2 PUFF: 90 AEROSOL, METERED RESPIRATORY (INHALATION) at 11:14

## 2022-01-01 RX ADMIN — Medication 2000 UNITS: at 07:55

## 2022-01-01 RX ADMIN — IPRATROPIUM BROMIDE 2 PUFF: 17 AEROSOL, METERED RESPIRATORY (INHALATION) at 07:14

## 2022-01-01 RX ADMIN — CHLORHEXIDINE GLUCONATE 0.12% ORAL RINSE 15 ML: 1.2 LIQUID ORAL at 09:35

## 2022-01-01 RX ADMIN — CLOBAZAM 20 MG: 10 TABLET ORAL at 08:43

## 2022-01-01 RX ADMIN — ALBUTEROL SULFATE 2 PUFF: 90 AEROSOL, METERED RESPIRATORY (INHALATION) at 07:55

## 2022-01-01 RX ADMIN — LORAZEPAM 1 MG: 2 INJECTION INTRAMUSCULAR; INTRAVENOUS at 15:16

## 2022-01-01 RX ADMIN — PROPOFOL 50 MCG/KG/MIN: 10 INJECTION, EMULSION INTRAVENOUS at 22:57

## 2022-01-01 RX ADMIN — PROPOFOL 40 MCG/KG/MIN: 10 INJECTION, EMULSION INTRAVENOUS at 13:58

## 2022-01-01 RX ADMIN — CHLORHEXIDINE GLUCONATE 0.12% ORAL RINSE 15 ML: 1.2 LIQUID ORAL at 06:06

## 2022-01-01 RX ADMIN — IPRATROPIUM BROMIDE 2 PUFF: 17 AEROSOL, METERED RESPIRATORY (INHALATION) at 07:37

## 2022-01-01 RX ADMIN — SODIUM CHLORIDE, PRESERVATIVE FREE 10 ML: 5 INJECTION INTRAVENOUS at 20:48

## 2022-01-01 RX ADMIN — PROPOFOL 50 MCG/KG/MIN: 10 INJECTION, EMULSION INTRAVENOUS at 06:18

## 2022-01-01 RX ADMIN — SODIUM CHLORIDE, PRESERVATIVE FREE 10 ML: 5 INJECTION INTRAVENOUS at 21:08

## 2022-01-01 RX ADMIN — DILTIAZEM HYDROCHLORIDE 30 MG: 30 TABLET, FILM COATED ORAL at 05:57

## 2022-01-01 RX ADMIN — TOPIRAMATE 200 MG: 100 TABLET, FILM COATED ORAL at 09:15

## 2022-01-01 RX ADMIN — Medication 500 MG: at 08:36

## 2022-01-01 RX ADMIN — PROPOFOL 45 MCG/KG/MIN: 10 INJECTION, EMULSION INTRAVENOUS at 02:00

## 2022-01-01 RX ADMIN — SODIUM CHLORIDE 25 ML: 9 INJECTION, SOLUTION INTRAVENOUS at 01:06

## 2022-01-01 RX ADMIN — PROPOFOL 120 MG: 10 INJECTION, EMULSION INTRAVENOUS at 00:19

## 2022-01-01 RX ADMIN — IPRATROPIUM BROMIDE 2 PUFF: 17 AEROSOL, METERED RESPIRATORY (INHALATION) at 07:13

## 2022-01-01 RX ADMIN — PROPOFOL 55 MCG/KG/MIN: 10 INJECTION, EMULSION INTRAVENOUS at 10:28

## 2022-01-01 RX ADMIN — TOPIRAMATE 200 MG: 100 TABLET, FILM COATED ORAL at 20:58

## 2022-01-01 RX ADMIN — TOPIRAMATE 200 MG: 100 TABLET, FILM COATED ORAL at 09:58

## 2022-01-01 RX ADMIN — Medication 500 MG: at 23:05

## 2022-01-01 RX ADMIN — DILTIAZEM HYDROCHLORIDE 30 MG: 30 TABLET, FILM COATED ORAL at 17:49

## 2022-01-01 RX ADMIN — MEROPENEM 1000 MG: 1 INJECTION INTRAVENOUS at 09:43

## 2022-01-01 RX ADMIN — MIDODRINE HYDROCHLORIDE 10 MG: 5 TABLET ORAL at 12:02

## 2022-01-01 RX ADMIN — INSULIN LISPRO 2 UNITS: 100 INJECTION, SOLUTION INTRAVENOUS; SUBCUTANEOUS at 11:49

## 2022-01-01 RX ADMIN — Medication 62.5 MCG/HR: at 00:50

## 2022-01-01 RX ADMIN — PROPOFOL 80 MCG/KG/MIN: 10 INJECTION, EMULSION INTRAVENOUS at 01:43

## 2022-01-01 RX ADMIN — Medication 500 MG: at 09:33

## 2022-01-01 RX ADMIN — ALBUTEROL SULFATE 2 PUFF: 90 AEROSOL, METERED RESPIRATORY (INHALATION) at 19:11

## 2022-01-01 RX ADMIN — ENOXAPARIN SODIUM 80 MG: 100 INJECTION SUBCUTANEOUS at 09:37

## 2022-01-01 RX ADMIN — DILTIAZEM HYDROCHLORIDE 5 MG: 5 INJECTION INTRAVENOUS at 18:45

## 2022-01-01 RX ADMIN — CLOBAZAM 20 MG: 10 TABLET ORAL at 09:36

## 2022-01-01 RX ADMIN — FAMOTIDINE 20 MG: 10 INJECTION, SOLUTION INTRAVENOUS at 08:53

## 2022-01-01 RX ADMIN — Medication 2000 UNITS: at 09:45

## 2022-01-01 RX ADMIN — IPRATROPIUM BROMIDE 2 PUFF: 17 AEROSOL, METERED RESPIRATORY (INHALATION) at 20:23

## 2022-01-01 RX ADMIN — TOPIRAMATE 200 MG: 100 TABLET, FILM COATED ORAL at 21:46

## 2022-01-01 RX ADMIN — CLONAZEPAM 0.5 MG: 0.5 TABLET ORAL at 22:23

## 2022-01-01 RX ADMIN — SODIUM CHLORIDE, PRESERVATIVE FREE 10 ML: 5 INJECTION INTRAVENOUS at 21:28

## 2022-01-01 RX ADMIN — FLUCONAZOLE 400 MG: 200 INJECTION, SOLUTION INTRAVENOUS at 15:00

## 2022-01-01 RX ADMIN — Medication 500 MG: at 10:03

## 2022-01-01 RX ADMIN — IPRATROPIUM BROMIDE 2 PUFF: 17 AEROSOL, METERED RESPIRATORY (INHALATION) at 19:11

## 2022-01-01 RX ADMIN — MEROPENEM-VABORBACTAM 4 G: 1; 1 INJECTION, POWDER, FOR SOLUTION INTRAVENOUS at 12:31

## 2022-01-01 RX ADMIN — FAMOTIDINE 20 MG: 10 INJECTION, SOLUTION INTRAVENOUS at 22:49

## 2022-01-01 RX ADMIN — Medication 2000 UNITS: at 09:25

## 2022-01-01 RX ADMIN — ALBUTEROL SULFATE 2 PUFF: 90 AEROSOL, METERED RESPIRATORY (INHALATION) at 15:25

## 2022-01-01 RX ADMIN — SODIUM CHLORIDE, PRESERVATIVE FREE 10 ML: 5 INJECTION INTRAVENOUS at 08:42

## 2022-01-01 RX ADMIN — LAMOTRIGINE 150 MG: 100 TABLET ORAL at 22:46

## 2022-01-01 RX ADMIN — AZITHROMYCIN MONOHYDRATE 500 MG: 500 INJECTION, POWDER, LYOPHILIZED, FOR SOLUTION INTRAVENOUS at 00:40

## 2022-01-01 RX ADMIN — GUAIFENESIN 600 MG: 600 TABLET, EXTENDED RELEASE ORAL at 09:51

## 2022-01-01 RX ADMIN — Medication 2000 UNITS: at 08:51

## 2022-01-01 RX ADMIN — MEROPENEM 1000 MG: 1 INJECTION INTRAVENOUS at 11:05

## 2022-01-01 RX ADMIN — SERTRALINE HYDROCHLORIDE 25 MG: 50 TABLET ORAL at 08:42

## 2022-01-01 RX ADMIN — IPRATROPIUM BROMIDE 2 PUFF: 17 AEROSOL, METERED RESPIRATORY (INHALATION) at 11:00

## 2022-01-01 RX ADMIN — CHLORHEXIDINE GLUCONATE 0.12% ORAL RINSE 15 ML: 1.2 LIQUID ORAL at 20:58

## 2022-01-01 RX ADMIN — FAMOTIDINE 20 MG: 10 INJECTION, SOLUTION INTRAVENOUS at 09:04

## 2022-01-01 RX ADMIN — FAMOTIDINE 20 MG: 10 INJECTION, SOLUTION INTRAVENOUS at 21:33

## 2022-01-01 RX ADMIN — Medication 87.5 MCG/HR: at 04:33

## 2022-01-01 RX ADMIN — SODIUM CHLORIDE, PRESERVATIVE FREE 10 ML: 5 INJECTION INTRAVENOUS at 07:54

## 2022-01-01 RX ADMIN — IPRATROPIUM BROMIDE 2 PUFF: 17 AEROSOL, METERED RESPIRATORY (INHALATION) at 08:22

## 2022-01-01 RX ADMIN — MEROPENEM 1000 MG: 1 INJECTION INTRAVENOUS at 03:39

## 2022-01-01 RX ADMIN — PROPOFOL 45 MCG/KG/MIN: 10 INJECTION, EMULSION INTRAVENOUS at 23:32

## 2022-01-01 RX ADMIN — ENOXAPARIN SODIUM 30 MG: 100 INJECTION SUBCUTANEOUS at 20:41

## 2022-01-01 RX ADMIN — PROPOFOL 45 MCG/KG/MIN: 10 INJECTION, EMULSION INTRAVENOUS at 08:50

## 2022-01-01 RX ADMIN — ALBUTEROL SULFATE 2 PUFF: 90 AEROSOL, METERED RESPIRATORY (INHALATION) at 15:47

## 2022-01-01 RX ADMIN — IPRATROPIUM BROMIDE 2 PUFF: 17 AEROSOL, METERED RESPIRATORY (INHALATION) at 15:48

## 2022-01-01 RX ADMIN — IPRATROPIUM BROMIDE 2 PUFF: 17 AEROSOL, METERED RESPIRATORY (INHALATION) at 07:10

## 2022-01-01 RX ADMIN — PROPOFOL 39.93 MCG/KG/MIN: 10 INJECTION, EMULSION INTRAVENOUS at 12:21

## 2022-01-01 RX ADMIN — Medication 163 MCG/HR: at 21:52

## 2022-01-01 RX ADMIN — MEROPENEM 1000 MG: 1 INJECTION INTRAVENOUS at 10:00

## 2022-01-01 RX ADMIN — ENOXAPARIN SODIUM 30 MG: 100 INJECTION SUBCUTANEOUS at 09:43

## 2022-01-01 RX ADMIN — SODIUM CHLORIDE, PRESERVATIVE FREE 10 ML: 5 INJECTION INTRAVENOUS at 09:38

## 2022-01-01 RX ADMIN — Medication 500 MG: at 21:47

## 2022-01-01 RX ADMIN — LAMOTRIGINE 150 MG: 100 TABLET ORAL at 20:59

## 2022-01-01 RX ADMIN — MEROPENEM 1000 MG: 1 INJECTION INTRAVENOUS at 10:33

## 2022-01-01 RX ADMIN — METHYLPREDNISOLONE SODIUM SUCCINATE 60 MG: 125 INJECTION, POWDER, FOR SOLUTION INTRAMUSCULAR; INTRAVENOUS at 06:05

## 2022-01-01 RX ADMIN — Medication 500 MG: at 07:52

## 2022-01-01 RX ADMIN — TOPIRAMATE 200 MG: 100 TABLET, FILM COATED ORAL at 09:43

## 2022-01-01 RX ADMIN — LAMOTRIGINE 150 MG: 100 TABLET ORAL at 09:22

## 2022-01-01 RX ADMIN — PROPOFOL 60 MCG/KG/MIN: 10 INJECTION, EMULSION INTRAVENOUS at 20:47

## 2022-01-01 RX ADMIN — DILTIAZEM HYDROCHLORIDE 30 MG: 30 TABLET, FILM COATED ORAL at 17:34

## 2022-01-01 RX ADMIN — CLOBAZAM 20 MG: 10 TABLET ORAL at 21:17

## 2022-01-01 RX ADMIN — SODIUM CHLORIDE, PRESERVATIVE FREE 10 ML: 5 INJECTION INTRAVENOUS at 22:29

## 2022-01-01 RX ADMIN — PROPOFOL 50 MCG/KG/MIN: 10 INJECTION, EMULSION INTRAVENOUS at 22:10

## 2022-01-01 RX ADMIN — ALBUTEROL SULFATE 2 PUFF: 90 AEROSOL, METERED RESPIRATORY (INHALATION) at 19:35

## 2022-01-01 RX ADMIN — ENOXAPARIN SODIUM 30 MG: 100 INJECTION SUBCUTANEOUS at 08:37

## 2022-01-01 RX ADMIN — Medication 500 MG: at 08:11

## 2022-01-01 RX ADMIN — DEXAMETHASONE SODIUM PHOSPHATE 6 MG: 10 INJECTION INTRAMUSCULAR; INTRAVENOUS at 08:36

## 2022-01-01 RX ADMIN — Medication 500 MG: at 20:37

## 2022-01-01 RX ADMIN — Medication 88 MCG/HR: at 09:01

## 2022-01-01 RX ADMIN — FAMOTIDINE 20 MG: 10 INJECTION, SOLUTION INTRAVENOUS at 21:44

## 2022-01-01 RX ADMIN — CHLORHEXIDINE GLUCONATE 0.12% ORAL RINSE 15 ML: 1.2 LIQUID ORAL at 21:46

## 2022-01-01 RX ADMIN — TOPIRAMATE 200 MG: 100 TABLET, FILM COATED ORAL at 19:48

## 2022-01-01 RX ADMIN — Medication 500 MG: at 08:27

## 2022-01-01 RX ADMIN — Medication 500 MG: at 21:11

## 2022-01-01 RX ADMIN — INSULIN LISPRO 2 UNITS: 100 INJECTION, SOLUTION INTRAVENOUS; SUBCUTANEOUS at 13:20

## 2022-01-01 RX ADMIN — METHYLPREDNISOLONE SODIUM SUCCINATE 60 MG: 125 INJECTION, POWDER, FOR SOLUTION INTRAMUSCULAR; INTRAVENOUS at 06:09

## 2022-01-01 RX ADMIN — PROPOFOL 45 MCG/KG/MIN: 10 INJECTION, EMULSION INTRAVENOUS at 10:50

## 2022-01-01 RX ADMIN — METHYLPREDNISOLONE SODIUM SUCCINATE 60 MG: 125 INJECTION, POWDER, FOR SOLUTION INTRAMUSCULAR; INTRAVENOUS at 05:57

## 2022-01-01 RX ADMIN — Medication 500 MG: at 08:43

## 2022-01-01 RX ADMIN — ALBUTEROL SULFATE 2 PUFF: 90 AEROSOL, METERED RESPIRATORY (INHALATION) at 10:49

## 2022-01-01 RX ADMIN — IPRATROPIUM BROMIDE 2 PUFF: 17 AEROSOL, METERED RESPIRATORY (INHALATION) at 11:16

## 2022-01-01 RX ADMIN — PROPOFOL 45 MCG/KG/MIN: 10 INJECTION, EMULSION INTRAVENOUS at 18:39

## 2022-01-01 RX ADMIN — SODIUM CHLORIDE: 4.5 INJECTION, SOLUTION INTRAVENOUS at 18:49

## 2022-01-01 RX ADMIN — PANTOPRAZOLE SODIUM 40 MG: 40 INJECTION, POWDER, FOR SOLUTION INTRAVENOUS at 08:36

## 2022-01-01 RX ADMIN — LAMOTRIGINE 150 MG: 100 TABLET ORAL at 23:00

## 2022-01-01 RX ADMIN — DILTIAZEM HYDROCHLORIDE 30 MG: 30 TABLET, FILM COATED ORAL at 12:19

## 2022-01-01 RX ADMIN — ROCURONIUM BROMIDE 50 MG: 10 INJECTION INTRAVENOUS at 00:20

## 2022-01-01 RX ADMIN — PROPOFOL 80 MCG/KG/MIN: 10 INJECTION, EMULSION INTRAVENOUS at 04:50

## 2022-01-01 RX ADMIN — SODIUM CHLORIDE: 4.5 INJECTION, SOLUTION INTRAVENOUS at 01:30

## 2022-01-01 RX ADMIN — PROPOFOL 45 MCG/KG/MIN: 10 INJECTION, EMULSION INTRAVENOUS at 12:35

## 2022-01-01 RX ADMIN — ENOXAPARIN SODIUM 30 MG: 100 INJECTION SUBCUTANEOUS at 21:33

## 2022-01-01 RX ADMIN — Medication 113 MCG/HR: at 04:54

## 2022-01-01 RX ADMIN — PROPOFOL 45 MCG/KG/MIN: 10 INJECTION, EMULSION INTRAVENOUS at 18:50

## 2022-01-01 RX ADMIN — SODIUM CHLORIDE: 4.5 INJECTION, SOLUTION INTRAVENOUS at 03:40

## 2022-01-01 RX ADMIN — MIDODRINE HYDROCHLORIDE 10 MG: 5 TABLET ORAL at 12:27

## 2022-01-01 RX ADMIN — Medication 500 MG: at 22:20

## 2022-01-01 RX ADMIN — IPRATROPIUM BROMIDE 2 PUFF: 17 AEROSOL, METERED RESPIRATORY (INHALATION) at 12:05

## 2022-01-01 RX ADMIN — FAMOTIDINE 20 MG: 10 INJECTION, SOLUTION INTRAVENOUS at 20:58

## 2022-01-01 RX ADMIN — Medication 500 MG: at 08:37

## 2022-01-01 RX ADMIN — IPRATROPIUM BROMIDE 2 PUFF: 17 AEROSOL, METERED RESPIRATORY (INHALATION) at 11:26

## 2022-01-01 RX ADMIN — ALBUTEROL SULFATE 2 PUFF: 90 AEROSOL, METERED RESPIRATORY (INHALATION) at 20:03

## 2022-01-01 RX ADMIN — CHLORHEXIDINE GLUCONATE 0.12% ORAL RINSE 15 ML: 1.2 LIQUID ORAL at 09:07

## 2022-01-01 RX ADMIN — TOPIRAMATE 200 MG: 100 TABLET, FILM COATED ORAL at 08:42

## 2022-01-01 RX ADMIN — ALBUTEROL SULFATE 2 PUFF: 90 AEROSOL, METERED RESPIRATORY (INHALATION) at 20:00

## 2022-01-01 RX ADMIN — Medication 500 MG: at 09:43

## 2022-01-01 RX ADMIN — VANCOMYCIN HYDROCHLORIDE 1250 MG: 5 INJECTION, POWDER, LYOPHILIZED, FOR SOLUTION INTRAVENOUS at 10:12

## 2022-01-01 RX ADMIN — IPRATROPIUM BROMIDE 2 PUFF: 17 AEROSOL, METERED RESPIRATORY (INHALATION) at 15:16

## 2022-01-01 RX ADMIN — Medication 500 MG: at 20:49

## 2022-01-01 RX ADMIN — METHYLPREDNISOLONE SODIUM SUCCINATE 60 MG: 125 INJECTION, POWDER, FOR SOLUTION INTRAMUSCULAR; INTRAVENOUS at 13:53

## 2022-01-01 RX ADMIN — ALBUTEROL SULFATE 2 PUFF: 90 AEROSOL, METERED RESPIRATORY (INHALATION) at 12:05

## 2022-01-01 RX ADMIN — Medication 2000 UNITS: at 09:08

## 2022-01-01 RX ADMIN — METHYLPREDNISOLONE SODIUM SUCCINATE 60 MG: 125 INJECTION, POWDER, FOR SOLUTION INTRAMUSCULAR; INTRAVENOUS at 08:38

## 2022-01-01 RX ADMIN — PROPOFOL 35 MCG/KG/MIN: 10 INJECTION, EMULSION INTRAVENOUS at 12:55

## 2022-01-01 RX ADMIN — IPRATROPIUM BROMIDE 2 PUFF: 17 AEROSOL, METERED RESPIRATORY (INHALATION) at 19:35

## 2022-01-01 RX ADMIN — FAMOTIDINE 20 MG: 10 INJECTION, SOLUTION INTRAVENOUS at 09:33

## 2022-01-01 RX ADMIN — PROPOFOL 50 MCG/KG/MIN: 10 INJECTION, EMULSION INTRAVENOUS at 09:49

## 2022-01-01 RX ADMIN — MORPHINE SULFATE 4 MG: 4 INJECTION INTRAVENOUS at 13:47

## 2022-01-01 RX ADMIN — METHYLPREDNISOLONE SODIUM SUCCINATE 60 MG: 125 INJECTION, POWDER, FOR SOLUTION INTRAMUSCULAR; INTRAVENOUS at 18:55

## 2022-01-01 RX ADMIN — ALBUTEROL SULFATE 2 PUFF: 90 AEROSOL, METERED RESPIRATORY (INHALATION) at 20:02

## 2022-01-01 RX ADMIN — ENOXAPARIN SODIUM 30 MG: 100 INJECTION SUBCUTANEOUS at 16:10

## 2022-01-01 RX ADMIN — DEXAMETHASONE SODIUM PHOSPHATE 6 MG: 10 INJECTION INTRAMUSCULAR; INTRAVENOUS at 09:43

## 2022-01-01 RX ADMIN — METHYLPREDNISOLONE SODIUM SUCCINATE 60 MG: 125 INJECTION, POWDER, FOR SOLUTION INTRAMUSCULAR; INTRAVENOUS at 06:18

## 2022-01-01 RX ADMIN — TOPIRAMATE 200 MG: 100 TABLET, FILM COATED ORAL at 21:33

## 2022-01-01 RX ADMIN — PROPOFOL 50 MCG/KG/MIN: 10 INJECTION, EMULSION INTRAVENOUS at 20:58

## 2022-01-01 RX ADMIN — MEROPENEM 1000 MG: 1 INJECTION INTRAVENOUS at 02:55

## 2022-01-01 RX ADMIN — Medication 500 MG: at 21:35

## 2022-01-01 RX ADMIN — CLOBAZAM 20 MG: 10 TABLET ORAL at 09:35

## 2022-01-01 RX ADMIN — IPRATROPIUM BROMIDE 2 PUFF: 17 AEROSOL, METERED RESPIRATORY (INHALATION) at 07:57

## 2022-01-01 RX ADMIN — DEXAMETHASONE SODIUM PHOSPHATE 6 MG: 10 INJECTION INTRAMUSCULAR; INTRAVENOUS at 08:50

## 2022-01-01 RX ADMIN — PANTOPRAZOLE SODIUM 40 MG: 40 INJECTION, POWDER, FOR SOLUTION INTRAVENOUS at 10:18

## 2022-01-01 RX ADMIN — PROPOFOL 55 MCG/KG/MIN: 10 INJECTION, EMULSION INTRAVENOUS at 07:56

## 2022-01-01 RX ADMIN — TOPIRAMATE 350 MG: 100 TABLET, FILM COATED ORAL at 09:06

## 2022-01-01 RX ADMIN — SERTRALINE HYDROCHLORIDE 25 MG: 50 TABLET ORAL at 07:52

## 2022-01-01 RX ADMIN — IPRATROPIUM BROMIDE 2 PUFF: 17 AEROSOL, METERED RESPIRATORY (INHALATION) at 20:01

## 2022-01-01 RX ADMIN — DILTIAZEM HYDROCHLORIDE 30 MG: 30 TABLET, FILM COATED ORAL at 17:42

## 2022-01-01 RX ADMIN — Medication 6000 UNITS: at 08:52

## 2022-01-01 RX ADMIN — PROPOFOL 80 MCG/KG/MIN: 10 INJECTION, EMULSION INTRAVENOUS at 21:28

## 2022-01-01 RX ADMIN — PROPOFOL 40 MCG/KG/MIN: 10 INJECTION, EMULSION INTRAVENOUS at 02:55

## 2022-01-01 RX ADMIN — PROPOFOL 40 MCG/KG/MIN: 10 INJECTION, EMULSION INTRAVENOUS at 15:38

## 2022-01-01 RX ADMIN — CHLORHEXIDINE GLUCONATE 0.12% ORAL RINSE 15 ML: 1.2 LIQUID ORAL at 08:50

## 2022-01-01 RX ADMIN — Medication 2000 UNITS: at 10:04

## 2022-01-01 RX ADMIN — PROPOFOL 45 MCG/KG/MIN: 10 INJECTION, EMULSION INTRAVENOUS at 03:02

## 2022-01-01 RX ADMIN — VANCOMYCIN HYDROCHLORIDE 1250 MG: 5 INJECTION, POWDER, LYOPHILIZED, FOR SOLUTION INTRAVENOUS at 20:13

## 2022-01-01 RX ADMIN — ENOXAPARIN SODIUM 80 MG: 100 INJECTION SUBCUTANEOUS at 21:30

## 2022-01-01 RX ADMIN — SERTRALINE HYDROCHLORIDE 25 MG: 50 TABLET ORAL at 08:36

## 2022-01-01 RX ADMIN — Medication 500 MG: at 11:35

## 2022-01-01 RX ADMIN — AMIODARONE HYDROCHLORIDE 150 MG: 50 INJECTION, SOLUTION INTRAVENOUS at 19:18

## 2022-01-01 RX ADMIN — MIDODRINE HYDROCHLORIDE 10 MG: 5 TABLET ORAL at 10:06

## 2022-01-01 RX ADMIN — PROPOFOL 70 MCG/KG/MIN: 10 INJECTION, EMULSION INTRAVENOUS at 12:59

## 2022-01-01 RX ADMIN — MIDODRINE HYDROCHLORIDE 10 MG: 5 TABLET ORAL at 07:52

## 2022-01-01 RX ADMIN — PROPOFOL 45 MCG/KG/MIN: 10 INJECTION, EMULSION INTRAVENOUS at 06:37

## 2022-01-01 RX ADMIN — Medication 62.5 MCG/HR: at 07:16

## 2022-01-01 RX ADMIN — SODIUM CHLORIDE 1000 MG: 9 INJECTION, SOLUTION INTRAVENOUS at 12:52

## 2022-01-01 RX ADMIN — CLOBAZAM 20 MG: 10 TABLET ORAL at 22:47

## 2022-01-01 RX ADMIN — CHLORHEXIDINE GLUCONATE 0.12% ORAL RINSE 15 ML: 1.2 LIQUID ORAL at 08:42

## 2022-01-01 RX ADMIN — TOPIRAMATE 200 MG: 100 TABLET, FILM COATED ORAL at 08:36

## 2022-01-01 RX ADMIN — TOPIRAMATE 200 MG: 100 TABLET, FILM COATED ORAL at 09:44

## 2022-01-01 RX ADMIN — SERTRALINE HYDROCHLORIDE 25 MG: 50 TABLET ORAL at 09:36

## 2022-01-01 RX ADMIN — Medication 62.5 MCG/HR: at 03:28

## 2022-01-01 RX ADMIN — SODIUM CHLORIDE, PRESERVATIVE FREE 10 ML: 5 INJECTION INTRAVENOUS at 21:53

## 2022-01-01 RX ADMIN — ACETAMINOPHEN 650 MG: 325 TABLET ORAL at 02:52

## 2022-01-01 RX ADMIN — FAMOTIDINE 20 MG: 10 INJECTION, SOLUTION INTRAVENOUS at 21:18

## 2022-01-01 RX ADMIN — SODIUM CHLORIDE, PRESERVATIVE FREE 10 ML: 5 INJECTION INTRAVENOUS at 20:58

## 2022-01-01 RX ADMIN — FAMOTIDINE 20 MG: 10 INJECTION, SOLUTION INTRAVENOUS at 08:51

## 2022-01-01 RX ADMIN — SODIUM CHLORIDE 1000 ML: 9 INJECTION, SOLUTION INTRAVENOUS at 05:24

## 2022-01-01 RX ADMIN — IPRATROPIUM BROMIDE 2 PUFF: 17 AEROSOL, METERED RESPIRATORY (INHALATION) at 16:16

## 2022-01-01 RX ADMIN — MIDODRINE HYDROCHLORIDE 10 MG: 5 TABLET ORAL at 17:24

## 2022-01-01 RX ADMIN — PROPOFOL 45 MCG/KG/MIN: 10 INJECTION, EMULSION INTRAVENOUS at 20:57

## 2022-01-01 RX ADMIN — SODIUM CHLORIDE 1000 MG: 9 INJECTION, SOLUTION INTRAVENOUS at 10:52

## 2022-01-01 RX ADMIN — IPRATROPIUM BROMIDE 2 PUFF: 17 AEROSOL, METERED RESPIRATORY (INHALATION) at 07:52

## 2022-01-01 RX ADMIN — PROPOFOL 45 MCG/KG/MIN: 10 INJECTION, EMULSION INTRAVENOUS at 12:32

## 2022-01-01 RX ADMIN — ALBUTEROL SULFATE 2 PUFF: 90 AEROSOL, METERED RESPIRATORY (INHALATION) at 07:50

## 2022-01-01 RX ADMIN — SODIUM CHLORIDE, PRESERVATIVE FREE 10 ML: 5 INJECTION INTRAVENOUS at 08:54

## 2022-01-01 RX ADMIN — SODIUM CHLORIDE, PRESERVATIVE FREE 10 ML: 5 INJECTION INTRAVENOUS at 21:30

## 2022-01-01 RX ADMIN — Medication 2000 UNITS: at 12:46

## 2022-01-01 RX ADMIN — PROPOFOL 60 MCG/KG/MIN: 10 INJECTION, EMULSION INTRAVENOUS at 08:49

## 2022-01-01 RX ADMIN — PROPOFOL 45 MCG/KG/MIN: 10 INJECTION, EMULSION INTRAVENOUS at 03:55

## 2022-01-01 RX ADMIN — Medication 163 MCG/HR: at 05:43

## 2022-01-01 RX ADMIN — MEROPENEM 1000 MG: 1 INJECTION INTRAVENOUS at 19:08

## 2022-01-01 RX ADMIN — Medication 62.5 MCG/HR: at 16:38

## 2022-01-01 RX ADMIN — ALBUTEROL SULFATE 2 PUFF: 90 AEROSOL, METERED RESPIRATORY (INHALATION) at 18:52

## 2022-01-01 RX ADMIN — ALBUTEROL SULFATE 2 PUFF: 90 AEROSOL, METERED RESPIRATORY (INHALATION) at 19:21

## 2022-01-01 RX ADMIN — SODIUM CHLORIDE, PRESERVATIVE FREE 20 ML: 5 INJECTION INTRAVENOUS at 21:01

## 2022-01-01 RX ADMIN — PROPOFOL 45 MCG/KG/MIN: 10 INJECTION, EMULSION INTRAVENOUS at 20:07

## 2022-01-01 RX ADMIN — LAMOTRIGINE 150 MG: 100 TABLET ORAL at 20:41

## 2022-01-01 RX ADMIN — CHLORHEXIDINE GLUCONATE 0.12% ORAL RINSE 15 ML: 1.2 LIQUID ORAL at 22:25

## 2022-01-01 RX ADMIN — Medication 87.5 MCG/HR: at 20:57

## 2022-01-01 RX ADMIN — Medication 2000 UNITS: at 08:43

## 2022-01-01 RX ADMIN — TOPIRAMATE 200 MG: 100 TABLET, FILM COATED ORAL at 09:22

## 2022-01-01 RX ADMIN — CHLORHEXIDINE GLUCONATE 0.12% ORAL RINSE 15 ML: 1.2 LIQUID ORAL at 21:33

## 2022-01-01 RX ADMIN — IPRATROPIUM BROMIDE 2 PUFF: 17 AEROSOL, METERED RESPIRATORY (INHALATION) at 19:27

## 2022-01-01 RX ADMIN — PROPOFOL 40 MCG/KG/MIN: 10 INJECTION, EMULSION INTRAVENOUS at 15:42

## 2022-01-01 RX ADMIN — SODIUM CHLORIDE, PRESERVATIVE FREE 20 ML: 5 INJECTION INTRAVENOUS at 09:08

## 2022-01-01 RX ADMIN — ALBUTEROL SULFATE 2 PUFF: 90 AEROSOL, METERED RESPIRATORY (INHALATION) at 19:28

## 2022-01-01 RX ADMIN — MIDODRINE HYDROCHLORIDE 10 MG: 5 TABLET ORAL at 11:52

## 2022-01-01 RX ADMIN — AZITHROMYCIN MONOHYDRATE 500 MG: 500 INJECTION, POWDER, LYOPHILIZED, FOR SOLUTION INTRAVENOUS at 00:52

## 2022-01-01 RX ADMIN — LAMOTRIGINE 150 MG: 100 TABLET ORAL at 23:51

## 2022-01-01 RX ADMIN — PROPOFOL 65 MCG/KG/MIN: 10 INJECTION, EMULSION INTRAVENOUS at 13:06

## 2022-01-01 RX ADMIN — CHLORHEXIDINE GLUCONATE 0.12% ORAL RINSE 15 ML: 1.2 LIQUID ORAL at 20:44

## 2022-01-01 RX ADMIN — PROPOFOL 43.7 MCG/KG/MIN: 10 INJECTION, EMULSION INTRAVENOUS at 13:15

## 2022-01-01 RX ADMIN — ALBUTEROL SULFATE 2 PUFF: 90 AEROSOL, METERED RESPIRATORY (INHALATION) at 19:27

## 2022-01-01 RX ADMIN — ALBUTEROL SULFATE 2 PUFF: 90 AEROSOL, METERED RESPIRATORY (INHALATION) at 15:06

## 2022-01-01 RX ADMIN — GUAIFENESIN 600 MG: 600 TABLET, EXTENDED RELEASE ORAL at 19:48

## 2022-01-01 RX ADMIN — ENOXAPARIN SODIUM 80 MG: 100 INJECTION SUBCUTANEOUS at 09:24

## 2022-01-01 RX ADMIN — AZITHROMYCIN MONOHYDRATE 500 MG: 500 INJECTION, POWDER, LYOPHILIZED, FOR SOLUTION INTRAVENOUS at 00:23

## 2022-01-01 RX ADMIN — PROPOFOL 50 MCG/KG/MIN: 10 INJECTION, EMULSION INTRAVENOUS at 12:46

## 2022-01-01 RX ADMIN — VANCOMYCIN HYDROCHLORIDE 1250 MG: 5 INJECTION, POWDER, LYOPHILIZED, FOR SOLUTION INTRAVENOUS at 21:32

## 2022-01-01 RX ADMIN — PROPOFOL 50 MCG/KG/MIN: 10 INJECTION, EMULSION INTRAVENOUS at 13:27

## 2022-01-01 RX ADMIN — MEROPENEM 1000 MG: 1 INJECTION INTRAVENOUS at 02:33

## 2022-01-01 RX ADMIN — Medication 500 MG: at 20:16

## 2022-01-01 RX ADMIN — CLOBAZAM 20 MG: 10 TABLET ORAL at 09:22

## 2022-01-01 RX ADMIN — LAMOTRIGINE 150 MG: 100 TABLET ORAL at 07:54

## 2022-01-01 RX ADMIN — ALBUTEROL SULFATE 2 PUFF: 90 AEROSOL, METERED RESPIRATORY (INHALATION) at 07:19

## 2022-01-01 RX ADMIN — Medication 2000 UNITS: at 08:15

## 2022-01-01 RX ADMIN — PROPOFOL 80 MCG/KG/MIN: 10 INJECTION, EMULSION INTRAVENOUS at 02:58

## 2022-01-01 RX ADMIN — SODIUM CHLORIDE, PRESERVATIVE FREE 10 ML: 5 INJECTION INTRAVENOUS at 20:34

## 2022-01-01 RX ADMIN — CEFTRIAXONE SODIUM 1000 MG: 1 INJECTION, POWDER, FOR SOLUTION INTRAMUSCULAR; INTRAVENOUS at 00:52

## 2022-01-01 RX ADMIN — Medication 500 MG: at 09:35

## 2022-01-01 RX ADMIN — SODIUM CHLORIDE, PRESERVATIVE FREE 10 ML: 5 INJECTION INTRAVENOUS at 21:36

## 2022-01-01 RX ADMIN — PROPOFOL 43.7 MCG/KG/MIN: 10 INJECTION, EMULSION INTRAVENOUS at 09:28

## 2022-01-01 RX ADMIN — LAMOTRIGINE 150 MG: 100 TABLET ORAL at 20:29

## 2022-01-01 RX ADMIN — CEFTRIAXONE SODIUM 1000 MG: 1 INJECTION, POWDER, FOR SOLUTION INTRAMUSCULAR; INTRAVENOUS at 00:44

## 2022-01-01 RX ADMIN — SODIUM CHLORIDE, PRESERVATIVE FREE 10 ML: 5 INJECTION INTRAVENOUS at 21:00

## 2022-01-01 RX ADMIN — Medication 500 MG: at 08:42

## 2022-01-01 RX ADMIN — GUAIFENESIN 600 MG: 600 TABLET, EXTENDED RELEASE ORAL at 08:37

## 2022-01-01 RX ADMIN — ALBUTEROL SULFATE 2 PUFF: 90 AEROSOL, METERED RESPIRATORY (INHALATION) at 19:30

## 2022-01-01 RX ADMIN — LAMOTRIGINE 150 MG: 100 TABLET ORAL at 08:14

## 2022-01-01 RX ADMIN — ENOXAPARIN SODIUM 80 MG: 100 INJECTION SUBCUTANEOUS at 21:34

## 2022-01-01 RX ADMIN — SERTRALINE HYDROCHLORIDE 25 MG: 50 TABLET ORAL at 08:54

## 2022-01-01 RX ADMIN — DILTIAZEM HYDROCHLORIDE 30 MG: 30 TABLET, FILM COATED ORAL at 00:39

## 2022-01-01 RX ADMIN — PANTOPRAZOLE SODIUM 40 MG: 40 INJECTION, POWDER, FOR SOLUTION INTRAVENOUS at 08:53

## 2022-01-01 RX ADMIN — LAMOTRIGINE 150 MG: 100 TABLET ORAL at 09:53

## 2022-01-01 RX ADMIN — SERTRALINE HYDROCHLORIDE 25 MG: 50 TABLET ORAL at 08:43

## 2022-01-01 RX ADMIN — IPRATROPIUM BROMIDE 2 PUFF: 17 AEROSOL, METERED RESPIRATORY (INHALATION) at 15:25

## 2022-01-01 RX ADMIN — PROPOFOL 40 MCG/KG/MIN: 10 INJECTION, EMULSION INTRAVENOUS at 07:44

## 2022-01-01 RX ADMIN — METHYLPREDNISOLONE SODIUM SUCCINATE 60 MG: 125 INJECTION, POWDER, FOR SOLUTION INTRAMUSCULAR; INTRAVENOUS at 00:53

## 2022-01-01 RX ADMIN — Medication 500 MG: at 20:44

## 2022-01-01 RX ADMIN — Medication 87.5 MCG/HR: at 17:15

## 2022-01-01 RX ADMIN — ALBUTEROL SULFATE 2 PUFF: 90 AEROSOL, METERED RESPIRATORY (INHALATION) at 20:23

## 2022-01-01 RX ADMIN — ENOXAPARIN SODIUM 80 MG: 100 INJECTION SUBCUTANEOUS at 21:11

## 2022-01-01 RX ADMIN — ACETYLCYSTEINE 400 MG: 200 SOLUTION ORAL; RESPIRATORY (INHALATION) at 07:32

## 2022-01-01 RX ADMIN — ALBUTEROL SULFATE 2 PUFF: 90 AEROSOL, METERED RESPIRATORY (INHALATION) at 07:06

## 2022-01-01 RX ADMIN — PROPOFOL 70 MCG/KG/MIN: 10 INJECTION, EMULSION INTRAVENOUS at 00:12

## 2022-01-01 RX ADMIN — MIDAZOLAM 2 MG: 1 INJECTION INTRAMUSCULAR; INTRAVENOUS at 00:18

## 2022-01-01 RX ADMIN — CISATRACURIUM BESYLATE 2 MCG/KG/MIN: 200 INJECTION INTRAVENOUS at 23:53

## 2022-01-01 RX ADMIN — Medication 6000 UNITS: at 20:48

## 2022-01-01 RX ADMIN — ALBUTEROL SULFATE 2 PUFF: 90 AEROSOL, METERED RESPIRATORY (INHALATION) at 08:17

## 2022-01-01 RX ADMIN — ALBUTEROL SULFATE 2 PUFF: 90 AEROSOL, METERED RESPIRATORY (INHALATION) at 07:20

## 2022-01-01 RX ADMIN — Medication 500 MG: at 20:38

## 2022-01-01 RX ADMIN — SERTRALINE HYDROCHLORIDE 25 MG: 50 TABLET ORAL at 08:38

## 2022-01-01 RX ADMIN — FAMOTIDINE 20 MG: 10 INJECTION, SOLUTION INTRAVENOUS at 22:26

## 2022-01-01 RX ADMIN — Medication 62.5 MCG/HR: at 09:20

## 2022-01-01 RX ADMIN — DEXAMETHASONE SODIUM PHOSPHATE 6 MG: 10 INJECTION INTRAMUSCULAR; INTRAVENOUS at 09:22

## 2022-01-01 RX ADMIN — ACETAMINOPHEN 650 MG: 650 SUPPOSITORY RECTAL at 21:34

## 2022-01-01 RX ADMIN — CLOBAZAM 20 MG: 10 TABLET ORAL at 21:34

## 2022-01-01 RX ADMIN — IPRATROPIUM BROMIDE 2 PUFF: 17 AEROSOL, METERED RESPIRATORY (INHALATION) at 15:18

## 2022-01-01 RX ADMIN — ACETAMINOPHEN 650 MG: 325 TABLET ORAL at 14:38

## 2022-01-01 RX ADMIN — Medication 62.5 MCG/HR: at 21:14

## 2022-01-01 RX ADMIN — Medication 3 MG: at 20:47

## 2022-01-01 RX ADMIN — PROPOFOL 35 MCG/KG/MIN: 10 INJECTION, EMULSION INTRAVENOUS at 06:27

## 2022-01-01 RX ADMIN — LAMOTRIGINE 400 MG: 100 TABLET ORAL at 08:38

## 2022-01-01 RX ADMIN — GUAIFENESIN 600 MG: 600 TABLET, EXTENDED RELEASE ORAL at 08:51

## 2022-01-01 RX ADMIN — SODIUM CHLORIDE: 4.5 INJECTION, SOLUTION INTRAVENOUS at 18:23

## 2022-01-01 RX ADMIN — IPRATROPIUM BROMIDE 2 PUFF: 17 AEROSOL, METERED RESPIRATORY (INHALATION) at 14:26

## 2022-01-01 RX ADMIN — MIDODRINE HYDROCHLORIDE 10 MG: 5 TABLET ORAL at 10:09

## 2022-01-01 RX ADMIN — CEFTRIAXONE SODIUM 1000 MG: 1 INJECTION, POWDER, FOR SOLUTION INTRAMUSCULAR; INTRAVENOUS at 00:08

## 2022-01-01 RX ADMIN — PROPOFOL 80 MCG/KG/MIN: 10 INJECTION, EMULSION INTRAVENOUS at 14:30

## 2022-01-01 RX ADMIN — CLOBAZAM 20 MG: 10 TABLET ORAL at 20:44

## 2022-01-01 RX ADMIN — CEFTRIAXONE SODIUM 1000 MG: 1 INJECTION, POWDER, FOR SOLUTION INTRAMUSCULAR; INTRAVENOUS at 02:39

## 2022-01-01 RX ADMIN — ALBUTEROL SULFATE 2 PUFF: 90 AEROSOL, METERED RESPIRATORY (INHALATION) at 15:13

## 2022-01-01 RX ADMIN — Medication 163 MCG/HR: at 16:41

## 2022-01-01 RX ADMIN — PROPOFOL 45 MCG/KG/MIN: 10 INJECTION, EMULSION INTRAVENOUS at 21:25

## 2022-01-01 RX ADMIN — CHLORHEXIDINE GLUCONATE 0.12% ORAL RINSE 15 ML: 1.2 LIQUID ORAL at 09:37

## 2022-01-01 RX ADMIN — METHYLPREDNISOLONE SODIUM SUCCINATE 60 MG: 125 INJECTION, POWDER, FOR SOLUTION INTRAMUSCULAR; INTRAVENOUS at 02:32

## 2022-01-01 RX ADMIN — TOPIRAMATE 200 MG: 100 TABLET, FILM COATED ORAL at 08:11

## 2022-01-01 RX ADMIN — FAMOTIDINE 20 MG: 10 INJECTION, SOLUTION INTRAVENOUS at 08:44

## 2022-01-01 RX ADMIN — ALBUTEROL SULFATE 2 PUFF: 90 AEROSOL, METERED RESPIRATORY (INHALATION) at 20:27

## 2022-01-01 RX ADMIN — Medication 62.5 MCG/HR: at 18:12

## 2022-01-01 RX ADMIN — Medication 12.5 MCG/HR: at 00:33

## 2022-01-01 RX ADMIN — Medication 500 MG: at 22:48

## 2022-01-01 RX ADMIN — VANCOMYCIN HYDROCHLORIDE 1250 MG: 5 INJECTION, POWDER, LYOPHILIZED, FOR SOLUTION INTRAVENOUS at 21:10

## 2022-01-01 RX ADMIN — SODIUM CHLORIDE, PRESERVATIVE FREE 10 ML: 5 INJECTION INTRAVENOUS at 09:10

## 2022-01-01 RX ADMIN — DEXAMETHASONE SODIUM PHOSPHATE 6 MG: 10 INJECTION INTRAMUSCULAR; INTRAVENOUS at 08:38

## 2022-01-01 RX ADMIN — DILTIAZEM HYDROCHLORIDE 30 MG: 30 TABLET, FILM COATED ORAL at 13:15

## 2022-01-01 RX ADMIN — CHLORHEXIDINE GLUCONATE 0.12% ORAL RINSE 15 ML: 1.2 LIQUID ORAL at 08:02

## 2022-01-01 RX ADMIN — Medication 500 MG: at 08:39

## 2022-01-01 RX ADMIN — GUAIFENESIN 600 MG: 600 TABLET, EXTENDED RELEASE ORAL at 08:38

## 2022-01-01 RX ADMIN — CEFTRIAXONE SODIUM 1000 MG: 1 INJECTION, POWDER, FOR SOLUTION INTRAMUSCULAR; INTRAVENOUS at 23:24

## 2022-01-01 RX ADMIN — IPRATROPIUM BROMIDE 2 PUFF: 17 AEROSOL, METERED RESPIRATORY (INHALATION) at 21:02

## 2022-01-01 RX ADMIN — LAMOTRIGINE 150 MG: 100 TABLET ORAL at 09:47

## 2022-01-01 RX ADMIN — ENOXAPARIN SODIUM 80 MG: 100 INJECTION SUBCUTANEOUS at 20:31

## 2022-01-01 RX ADMIN — MEROPENEM 1000 MG: 1 INJECTION INTRAVENOUS at 10:12

## 2022-01-01 RX ADMIN — Medication 2 MCG/MIN: at 03:21

## 2022-01-01 RX ADMIN — IPRATROPIUM BROMIDE 2 PUFF: 17 AEROSOL, METERED RESPIRATORY (INHALATION) at 11:29

## 2022-01-01 RX ADMIN — ATORVASTATIN CALCIUM 80 MG: 80 TABLET, FILM COATED ORAL at 21:12

## 2022-01-01 RX ADMIN — ALBUTEROL SULFATE 2 PUFF: 90 AEROSOL, METERED RESPIRATORY (INHALATION) at 07:11

## 2022-01-01 RX ADMIN — TOPIRAMATE 350 MG: 100 TABLET, FILM COATED ORAL at 20:37

## 2022-01-01 RX ADMIN — FLUCONAZOLE 400 MG: 200 INJECTION, SOLUTION INTRAVENOUS at 17:53

## 2022-01-01 RX ADMIN — SODIUM CHLORIDE, PRESERVATIVE FREE 10 ML: 5 INJECTION INTRAVENOUS at 10:01

## 2022-01-01 RX ADMIN — MIDODRINE HYDROCHLORIDE 10 MG: 5 TABLET ORAL at 12:31

## 2022-01-01 RX ADMIN — CHLORHEXIDINE GLUCONATE 0.12% ORAL RINSE 15 ML: 1.2 LIQUID ORAL at 20:50

## 2022-01-01 RX ADMIN — LAMOTRIGINE 400 MG: 100 TABLET ORAL at 20:44

## 2022-01-01 RX ADMIN — PROPOFOL 80 MCG/KG/MIN: 10 INJECTION, EMULSION INTRAVENOUS at 11:39

## 2022-01-01 RX ADMIN — Medication 500 MG: at 09:04

## 2022-01-01 RX ADMIN — IPRATROPIUM BROMIDE 2 PUFF: 17 AEROSOL, METERED RESPIRATORY (INHALATION) at 19:21

## 2022-01-01 RX ADMIN — Medication 500 MG: at 20:58

## 2022-01-01 RX ADMIN — ALBUTEROL SULFATE 2 PUFF: 90 AEROSOL, METERED RESPIRATORY (INHALATION) at 08:11

## 2022-01-01 RX ADMIN — ALBUTEROL SULFATE 2 PUFF: 90 AEROSOL, METERED RESPIRATORY (INHALATION) at 10:54

## 2022-01-01 RX ADMIN — ENOXAPARIN SODIUM 30 MG: 100 INJECTION SUBCUTANEOUS at 22:52

## 2022-01-01 RX ADMIN — Medication 2000 UNITS: at 09:43

## 2022-01-01 RX ADMIN — Medication 2000 UNITS: at 09:10

## 2022-01-01 RX ADMIN — FAMOTIDINE 20 MG: 10 INJECTION, SOLUTION INTRAVENOUS at 21:51

## 2022-01-01 RX ADMIN — SODIUM CHLORIDE, PRESERVATIVE FREE 10 ML: 5 INJECTION INTRAVENOUS at 08:44

## 2022-01-01 RX ADMIN — IPRATROPIUM BROMIDE 2 PUFF: 17 AEROSOL, METERED RESPIRATORY (INHALATION) at 17:27

## 2022-01-01 RX ADMIN — TOPIRAMATE 200 MG: 100 TABLET, FILM COATED ORAL at 20:48

## 2022-01-01 RX ADMIN — SERTRALINE HYDROCHLORIDE 25 MG: 50 TABLET ORAL at 10:00

## 2022-01-01 RX ADMIN — Medication 2000 UNITS: at 09:55

## 2022-01-01 RX ADMIN — INSULIN LISPRO 2 UNITS: 100 INJECTION, SOLUTION INTRAVENOUS; SUBCUTANEOUS at 07:52

## 2022-01-01 RX ADMIN — MEROPENEM 1000 MG: 1 INJECTION INTRAVENOUS at 10:58

## 2022-01-01 RX ADMIN — LAMOTRIGINE 400 MG: 100 TABLET ORAL at 22:20

## 2022-01-01 RX ADMIN — ALBUTEROL SULFATE 2 PUFF: 90 AEROSOL, METERED RESPIRATORY (INHALATION) at 15:46

## 2022-01-01 RX ADMIN — ALBUTEROL SULFATE 2 PUFF: 90 AEROSOL, METERED RESPIRATORY (INHALATION) at 14:52

## 2022-01-01 RX ADMIN — CHLORHEXIDINE GLUCONATE 0.12% ORAL RINSE 15 ML: 1.2 LIQUID ORAL at 10:18

## 2022-01-01 RX ADMIN — METHYLPREDNISOLONE SODIUM SUCCINATE 60 MG: 125 INJECTION, POWDER, FOR SOLUTION INTRAMUSCULAR; INTRAVENOUS at 06:53

## 2022-01-01 RX ADMIN — LAMOTRIGINE 150 MG: 100 TABLET ORAL at 10:03

## 2022-01-01 RX ADMIN — GUAIFENESIN 600 MG: 600 TABLET, EXTENDED RELEASE ORAL at 21:30

## 2022-01-01 RX ADMIN — ALBUTEROL SULFATE 2 PUFF: 90 AEROSOL, METERED RESPIRATORY (INHALATION) at 17:27

## 2022-01-01 RX ADMIN — GUAIFENESIN 600 MG: 600 TABLET, EXTENDED RELEASE ORAL at 20:49

## 2022-01-01 RX ADMIN — TOPIRAMATE 200 MG: 100 TABLET, FILM COATED ORAL at 20:38

## 2022-01-01 RX ADMIN — VANCOMYCIN HYDROCHLORIDE 1250 MG: 5 INJECTION, POWDER, LYOPHILIZED, FOR SOLUTION INTRAVENOUS at 09:05

## 2022-01-01 RX ADMIN — IPRATROPIUM BROMIDE 2 PUFF: 17 AEROSOL, METERED RESPIRATORY (INHALATION) at 08:52

## 2022-01-01 RX ADMIN — LAMOTRIGINE 150 MG: 100 TABLET ORAL at 08:10

## 2022-01-01 RX ADMIN — PROPOFOL 80 MCG/KG/MIN: 10 INJECTION, EMULSION INTRAVENOUS at 11:15

## 2022-01-01 RX ADMIN — ALBUTEROL SULFATE 2 PUFF: 90 AEROSOL, METERED RESPIRATORY (INHALATION) at 11:38

## 2022-01-01 RX ADMIN — DILTIAZEM HYDROCHLORIDE 30 MG: 30 TABLET, FILM COATED ORAL at 05:32

## 2022-01-01 RX ADMIN — DILTIAZEM HYDROCHLORIDE 30 MG: 30 TABLET, FILM COATED ORAL at 06:53

## 2022-01-01 RX ADMIN — SERTRALINE HYDROCHLORIDE 25 MG: 50 TABLET ORAL at 08:27

## 2022-01-01 RX ADMIN — ALBUTEROL SULFATE 2 PUFF: 90 AEROSOL, METERED RESPIRATORY (INHALATION) at 08:52

## 2022-01-01 RX ADMIN — METHYLPREDNISOLONE SODIUM SUCCINATE 60 MG: 125 INJECTION, POWDER, FOR SOLUTION INTRAMUSCULAR; INTRAVENOUS at 12:41

## 2022-01-01 RX ADMIN — CEFTRIAXONE SODIUM 1000 MG: 1 INJECTION, POWDER, FOR SOLUTION INTRAMUSCULAR; INTRAVENOUS at 03:28

## 2022-01-01 RX ADMIN — DILTIAZEM HYDROCHLORIDE 30 MG: 30 TABLET, FILM COATED ORAL at 08:27

## 2022-01-01 RX ADMIN — LAMOTRIGINE 150 MG: 100 TABLET ORAL at 08:52

## 2022-01-01 RX ADMIN — FAMOTIDINE 20 MG: 10 INJECTION, SOLUTION INTRAVENOUS at 08:42

## 2022-01-01 RX ADMIN — Medication 88 MCG/HR: at 03:15

## 2022-01-01 RX ADMIN — SODIUM CHLORIDE 1.7 MCG/KG/MIN: 9 INJECTION, SOLUTION INTRAVENOUS at 05:15

## 2022-01-01 RX ADMIN — IPRATROPIUM BROMIDE 2 PUFF: 17 AEROSOL, METERED RESPIRATORY (INHALATION) at 11:05

## 2022-01-01 RX ADMIN — FAMOTIDINE 20 MG: 10 INJECTION, SOLUTION INTRAVENOUS at 08:02

## 2022-01-01 RX ADMIN — CHLORHEXIDINE GLUCONATE 0.12% ORAL RINSE 15 ML: 1.2 LIQUID ORAL at 21:12

## 2022-01-01 RX ADMIN — SODIUM CHLORIDE: 4.5 INJECTION, SOLUTION INTRAVENOUS at 16:57

## 2022-01-01 RX ADMIN — CLOBAZAM 20 MG: 10 TABLET ORAL at 08:37

## 2022-01-01 RX ADMIN — LAMOTRIGINE 150 MG: 100 TABLET ORAL at 21:11

## 2022-01-01 RX ADMIN — Medication 500 MG: at 08:38

## 2022-01-01 RX ADMIN — FAMOTIDINE 20 MG: 10 INJECTION, SOLUTION INTRAVENOUS at 08:49

## 2022-01-01 RX ADMIN — CHLORHEXIDINE GLUCONATE 0.12% ORAL RINSE 15 ML: 1.2 LIQUID ORAL at 08:53

## 2022-01-01 RX ADMIN — IPRATROPIUM BROMIDE 2 PUFF: 17 AEROSOL, METERED RESPIRATORY (INHALATION) at 15:02

## 2022-01-01 RX ADMIN — Medication 500 MG: at 20:30

## 2022-01-01 RX ADMIN — Medication 500 MG: at 20:15

## 2022-01-01 RX ADMIN — ENOXAPARIN SODIUM 80 MG: 100 INJECTION SUBCUTANEOUS at 21:27

## 2022-01-01 RX ADMIN — LAMOTRIGINE 150 MG: 100 TABLET ORAL at 21:37

## 2022-01-01 RX ADMIN — PROPOFOL 45 MCG/KG/MIN: 10 INJECTION, EMULSION INTRAVENOUS at 06:54

## 2022-01-01 RX ADMIN — SODIUM CHLORIDE: 4.5 INJECTION, SOLUTION INTRAVENOUS at 12:18

## 2022-01-01 RX ADMIN — TOPIRAMATE 200 MG: 100 TABLET, FILM COATED ORAL at 21:17

## 2022-01-01 RX ADMIN — INSULIN LISPRO 2 UNITS: 100 INJECTION, SOLUTION INTRAVENOUS; SUBCUTANEOUS at 12:25

## 2022-01-01 RX ADMIN — PROPOFOL 70 MCG/KG/MIN: 10 INJECTION, EMULSION INTRAVENOUS at 03:27

## 2022-01-01 RX ADMIN — ALBUTEROL SULFATE 2 PUFF: 90 AEROSOL, METERED RESPIRATORY (INHALATION) at 11:27

## 2022-01-01 RX ADMIN — AMIODARONE HYDROCHLORIDE 0.5 MG/MIN: 50 INJECTION, SOLUTION INTRAVENOUS at 03:51

## 2022-01-01 RX ADMIN — SODIUM CHLORIDE, PRESERVATIVE FREE 10 ML: 5 INJECTION INTRAVENOUS at 20:41

## 2022-01-01 RX ADMIN — ALBUTEROL SULFATE 2 PUFF: 90 AEROSOL, METERED RESPIRATORY (INHALATION) at 17:10

## 2022-01-01 RX ADMIN — Medication 500 MG: at 21:28

## 2022-01-01 RX ADMIN — TOPIRAMATE 200 MG: 100 TABLET, FILM COATED ORAL at 08:55

## 2022-01-01 RX ADMIN — ALBUTEROL SULFATE 2 PUFF: 90 AEROSOL, METERED RESPIRATORY (INHALATION) at 15:14

## 2022-01-01 RX ADMIN — MEROPENEM 1000 MG: 1 INJECTION INTRAVENOUS at 04:02

## 2022-01-01 RX ADMIN — ATORVASTATIN CALCIUM 80 MG: 80 TABLET, FILM COATED ORAL at 21:51

## 2022-01-01 RX ADMIN — SODIUM CHLORIDE: 4.5 INJECTION, SOLUTION INTRAVENOUS at 17:22

## 2022-01-01 RX ADMIN — PROPOFOL 45 MCG/KG/MIN: 10 INJECTION, EMULSION INTRAVENOUS at 14:49

## 2022-01-01 RX ADMIN — CHLORHEXIDINE GLUCONATE 0.12% ORAL RINSE 15 ML: 1.2 LIQUID ORAL at 20:16

## 2022-01-01 RX ADMIN — Medication 3 MG: at 20:58

## 2022-01-01 RX ADMIN — CLOBAZAM 20 MG: 10 TABLET ORAL at 07:52

## 2022-01-01 RX ADMIN — PROPOFOL 45 MCG/KG/MIN: 10 INJECTION, EMULSION INTRAVENOUS at 04:07

## 2022-01-01 RX ADMIN — CHLORHEXIDINE GLUCONATE 0.12% ORAL RINSE 15 ML: 1.2 LIQUID ORAL at 09:33

## 2022-01-01 RX ADMIN — LAMOTRIGINE 150 MG: 100 TABLET ORAL at 20:47

## 2022-01-01 RX ADMIN — CHLORHEXIDINE GLUCONATE 0.12% ORAL RINSE 15 ML: 1.2 LIQUID ORAL at 08:28

## 2022-01-01 RX ADMIN — PROPOFOL 45 MCG/KG/MIN: 10 INJECTION, EMULSION INTRAVENOUS at 07:51

## 2022-01-01 RX ADMIN — ENOXAPARIN SODIUM 80 MG: 100 INJECTION SUBCUTANEOUS at 20:48

## 2022-01-01 RX ADMIN — Medication 6000 UNITS: at 09:29

## 2022-01-01 RX ADMIN — Medication 163 MCG/HR: at 11:43

## 2022-01-01 RX ADMIN — LAMOTRIGINE 150 MG: 100 TABLET ORAL at 19:48

## 2022-01-01 RX ADMIN — SODIUM CHLORIDE 1000 ML: 9 INJECTION, SOLUTION INTRAVENOUS at 03:23

## 2022-01-01 RX ADMIN — DEXAMETHASONE SODIUM PHOSPHATE 10 MG: 10 INJECTION INTRAMUSCULAR; INTRAVENOUS at 12:34

## 2022-01-01 RX ADMIN — CHLORHEXIDINE GLUCONATE 0.12% ORAL RINSE 15 ML: 1.2 LIQUID ORAL at 21:17

## 2022-01-01 RX ADMIN — LAMOTRIGINE 150 MG: 100 TABLET ORAL at 09:17

## 2022-01-01 RX ADMIN — PROPOFOL 45 MCG/KG/MIN: 10 INJECTION, EMULSION INTRAVENOUS at 14:17

## 2022-01-01 RX ADMIN — ALBUTEROL SULFATE 2 PUFF: 90 AEROSOL, METERED RESPIRATORY (INHALATION) at 07:23

## 2022-01-01 RX ADMIN — LORAZEPAM 1 MG: 2 INJECTION INTRAMUSCULAR; INTRAVENOUS at 13:48

## 2022-01-01 RX ADMIN — TOPIRAMATE 200 MG: 100 TABLET, FILM COATED ORAL at 10:16

## 2022-01-01 RX ADMIN — FAMOTIDINE 20 MG: 10 INJECTION, SOLUTION INTRAVENOUS at 09:24

## 2022-01-01 RX ADMIN — CHLORHEXIDINE GLUCONATE 0.12% ORAL RINSE 15 ML: 1.2 LIQUID ORAL at 09:09

## 2022-01-01 RX ADMIN — IPRATROPIUM BROMIDE 2 PUFF: 17 AEROSOL, METERED RESPIRATORY (INHALATION) at 00:02

## 2022-01-01 RX ADMIN — ENOXAPARIN SODIUM 100 MG: 100 INJECTION SUBCUTANEOUS at 10:15

## 2022-01-01 RX ADMIN — IPRATROPIUM BROMIDE 2 PUFF: 17 AEROSOL, METERED RESPIRATORY (INHALATION) at 18:52

## 2022-01-01 RX ADMIN — IPRATROPIUM BROMIDE 2 PUFF: 17 AEROSOL, METERED RESPIRATORY (INHALATION) at 15:31

## 2022-01-01 RX ADMIN — Medication 500 MG: at 21:34

## 2022-01-01 RX ADMIN — TOPIRAMATE 200 MG: 100 TABLET, FILM COATED ORAL at 07:53

## 2022-01-01 RX ADMIN — FAMOTIDINE 20 MG: 10 INJECTION, SOLUTION INTRAVENOUS at 08:12

## 2022-01-01 RX ADMIN — PROPOFOL 80 MCG/KG/MIN: 10 INJECTION, EMULSION INTRAVENOUS at 19:38

## 2022-01-01 RX ADMIN — PROPOFOL 30 MCG/KG/MIN: 10 INJECTION, EMULSION INTRAVENOUS at 06:52

## 2022-01-01 RX ADMIN — PROPOFOL 45 MCG/KG/MIN: 10 INJECTION, EMULSION INTRAVENOUS at 18:44

## 2022-01-01 RX ADMIN — PROPOFOL 35 MCG/KG/MIN: 10 INJECTION, EMULSION INTRAVENOUS at 13:12

## 2022-01-01 RX ADMIN — ALBUTEROL SULFATE 2 PUFF: 90 AEROSOL, METERED RESPIRATORY (INHALATION) at 07:04

## 2022-01-01 RX ADMIN — Medication 500 MG: at 19:48

## 2022-01-01 RX ADMIN — FAMOTIDINE 20 MG: 10 INJECTION, SOLUTION INTRAVENOUS at 09:22

## 2022-01-01 RX ADMIN — CLOBAZAM 20 MG: 10 TABLET ORAL at 21:47

## 2022-01-01 RX ADMIN — TOPIRAMATE 200 MG: 100 TABLET, FILM COATED ORAL at 09:35

## 2022-01-01 RX ADMIN — TOPIRAMATE 200 MG: 100 TABLET, FILM COATED ORAL at 21:11

## 2022-01-01 RX ADMIN — ALBUTEROL SULFATE 2 PUFF: 90 AEROSOL, METERED RESPIRATORY (INHALATION) at 11:54

## 2022-01-01 RX ADMIN — Medication 500 MG: at 10:17

## 2022-01-01 RX ADMIN — BARICITINIB 4 MG: 2 TABLET, FILM COATED ORAL at 09:23

## 2022-01-01 RX ADMIN — ALBUTEROL SULFATE 2 PUFF: 90 AEROSOL, METERED RESPIRATORY (INHALATION) at 11:28

## 2022-01-01 RX ADMIN — ALBUTEROL SULFATE 2 PUFF: 90 AEROSOL, METERED RESPIRATORY (INHALATION) at 07:08

## 2022-01-01 RX ADMIN — ACETYLCYSTEINE 400 MG: 200 SOLUTION ORAL; RESPIRATORY (INHALATION) at 07:51

## 2022-01-01 RX ADMIN — GUAIFENESIN 600 MG: 600 TABLET, EXTENDED RELEASE ORAL at 09:06

## 2022-01-01 RX ADMIN — MEROPENEM 1000 MG: 1 INJECTION INTRAVENOUS at 18:33

## 2022-01-01 RX ADMIN — Medication 163 MCG/HR: at 12:37

## 2022-01-01 RX ADMIN — PROPOFOL 35 MCG/KG/MIN: 10 INJECTION, EMULSION INTRAVENOUS at 13:52

## 2022-01-01 RX ADMIN — IPRATROPIUM BROMIDE 2 PUFF: 17 AEROSOL, METERED RESPIRATORY (INHALATION) at 15:51

## 2022-01-01 RX ADMIN — PANTOPRAZOLE SODIUM 40 MG: 40 INJECTION, POWDER, FOR SOLUTION INTRAVENOUS at 08:38

## 2022-01-01 RX ADMIN — DILTIAZEM HYDROCHLORIDE 30 MG: 30 TABLET, FILM COATED ORAL at 12:53

## 2022-01-01 RX ADMIN — PROPOFOL 35 MCG/KG/MIN: 10 INJECTION, EMULSION INTRAVENOUS at 18:13

## 2022-01-01 RX ADMIN — CHLORHEXIDINE GLUCONATE 0.12% ORAL RINSE 15 ML: 1.2 LIQUID ORAL at 09:44

## 2022-01-01 RX ADMIN — PROPOFOL 50 MCG/KG/MIN: 10 INJECTION, EMULSION INTRAVENOUS at 17:09

## 2022-01-01 RX ADMIN — DILTIAZEM HYDROCHLORIDE 30 MG: 30 TABLET, FILM COATED ORAL at 00:48

## 2022-01-01 RX ADMIN — SODIUM CHLORIDE, PRESERVATIVE FREE 10 ML: 5 INJECTION INTRAVENOUS at 20:45

## 2022-01-01 RX ADMIN — PROPOFOL 50 MCG/KG/MIN: 10 INJECTION, EMULSION INTRAVENOUS at 00:12

## 2022-01-01 RX ADMIN — IPRATROPIUM BROMIDE 2 PUFF: 17 AEROSOL, METERED RESPIRATORY (INHALATION) at 15:06

## 2022-01-01 RX ADMIN — AZITHROMYCIN MONOHYDRATE 500 MG: 500 INJECTION, POWDER, LYOPHILIZED, FOR SOLUTION INTRAVENOUS at 00:14

## 2022-01-01 RX ADMIN — FAMOTIDINE 20 MG: 10 INJECTION, SOLUTION INTRAVENOUS at 10:17

## 2022-01-01 RX ADMIN — PROPOFOL 60 MCG/KG/MIN: 10 INJECTION, EMULSION INTRAVENOUS at 12:40

## 2022-01-01 RX ADMIN — IPRATROPIUM BROMIDE 2 PUFF: 17 AEROSOL, METERED RESPIRATORY (INHALATION) at 15:21

## 2022-01-01 RX ADMIN — PROPOFOL 45 MCG/KG/MIN: 10 INJECTION, EMULSION INTRAVENOUS at 11:48

## 2022-01-01 RX ADMIN — MEROPENEM 1000 MG: 1 INJECTION INTRAVENOUS at 09:44

## 2022-01-01 RX ADMIN — PANTOPRAZOLE SODIUM 40 MG: 40 INJECTION, POWDER, FOR SOLUTION INTRAVENOUS at 08:28

## 2022-01-01 RX ADMIN — SERTRALINE HYDROCHLORIDE 25 MG: 50 TABLET ORAL at 08:01

## 2022-01-01 RX ADMIN — MEROPENEM 1000 MG: 1 INJECTION INTRAVENOUS at 17:59

## 2022-01-01 RX ADMIN — PROPOFOL 45 MCG/KG/MIN: 10 INJECTION, EMULSION INTRAVENOUS at 21:30

## 2022-01-01 RX ADMIN — LAMOTRIGINE 150 MG: 100 TABLET ORAL at 08:39

## 2022-01-01 RX ADMIN — METHYLPREDNISOLONE SODIUM SUCCINATE 40 MG: 40 INJECTION, POWDER, FOR SOLUTION INTRAMUSCULAR; INTRAVENOUS at 10:00

## 2022-01-01 RX ADMIN — ENOXAPARIN SODIUM 30 MG: 100 INJECTION SUBCUTANEOUS at 09:44

## 2022-01-01 RX ADMIN — FAMOTIDINE 20 MG: 10 INJECTION, SOLUTION INTRAVENOUS at 08:28

## 2022-01-01 RX ADMIN — PROPOFOL 40 MCG/KG/MIN: 10 INJECTION, EMULSION INTRAVENOUS at 07:57

## 2022-01-01 RX ADMIN — MIDODRINE HYDROCHLORIDE 10 MG: 5 TABLET ORAL at 08:53

## 2022-01-01 RX ADMIN — ALBUTEROL SULFATE 2 PUFF: 90 AEROSOL, METERED RESPIRATORY (INHALATION) at 19:48

## 2022-01-01 RX ADMIN — ACETAMINOPHEN 650 MG: 650 SUPPOSITORY RECTAL at 03:48

## 2022-01-01 RX ADMIN — INSULIN LISPRO 2 UNITS: 100 INJECTION, SOLUTION INTRAVENOUS; SUBCUTANEOUS at 18:36

## 2022-01-01 RX ADMIN — PROPOFOL 80 MCG/KG/MIN: 10 INJECTION, EMULSION INTRAVENOUS at 14:19

## 2022-01-01 RX ADMIN — ALBUTEROL SULFATE 2 PUFF: 90 AEROSOL, METERED RESPIRATORY (INHALATION) at 08:45

## 2022-01-01 RX ADMIN — Medication 0.2 MG: at 13:44

## 2022-01-01 RX ADMIN — PROPOFOL 40 MCG/KG/MIN: 10 INJECTION, EMULSION INTRAVENOUS at 18:31

## 2022-01-01 RX ADMIN — IPRATROPIUM BROMIDE 2 PUFF: 17 AEROSOL, METERED RESPIRATORY (INHALATION) at 20:03

## 2022-01-01 RX ADMIN — SODIUM CHLORIDE, PRESERVATIVE FREE 10 ML: 5 INJECTION INTRAVENOUS at 08:13

## 2022-01-01 RX ADMIN — SODIUM CHLORIDE, PRESERVATIVE FREE 10 ML: 5 INJECTION INTRAVENOUS at 09:17

## 2022-01-01 RX ADMIN — MIDODRINE HYDROCHLORIDE 10 MG: 5 TABLET ORAL at 17:38

## 2022-01-01 RX ADMIN — IPRATROPIUM BROMIDE 2 PUFF: 17 AEROSOL, METERED RESPIRATORY (INHALATION) at 14:51

## 2022-01-01 RX ADMIN — ALBUTEROL SULFATE 2 PUFF: 90 AEROSOL, METERED RESPIRATORY (INHALATION) at 07:30

## 2022-01-01 RX ADMIN — IPRATROPIUM BROMIDE 2 PUFF: 17 AEROSOL, METERED RESPIRATORY (INHALATION) at 20:48

## 2022-01-01 RX ADMIN — CHLORHEXIDINE GLUCONATE 0.12% ORAL RINSE 15 ML: 1.2 LIQUID ORAL at 21:54

## 2022-01-01 RX ADMIN — TOPIRAMATE 200 MG: 100 TABLET, FILM COATED ORAL at 20:49

## 2022-01-01 RX ADMIN — IPRATROPIUM BROMIDE 2 PUFF: 17 AEROSOL, METERED RESPIRATORY (INHALATION) at 08:44

## 2022-01-01 RX ADMIN — FAMOTIDINE 20 MG: 10 INJECTION, SOLUTION INTRAVENOUS at 09:15

## 2022-01-01 RX ADMIN — CEFTRIAXONE SODIUM 1000 MG: 1 INJECTION, POWDER, FOR SOLUTION INTRAMUSCULAR; INTRAVENOUS at 03:16

## 2022-01-01 RX ADMIN — FAMOTIDINE 20 MG: 10 INJECTION, SOLUTION INTRAVENOUS at 20:31

## 2022-01-01 RX ADMIN — SODIUM CHLORIDE, PRESERVATIVE FREE 10 ML: 5 INJECTION INTRAVENOUS at 09:05

## 2022-01-01 RX ADMIN — METHYLPREDNISOLONE SODIUM SUCCINATE 60 MG: 125 INJECTION, POWDER, FOR SOLUTION INTRAMUSCULAR; INTRAVENOUS at 06:32

## 2022-01-01 RX ADMIN — SERTRALINE HYDROCHLORIDE 25 MG: 50 TABLET ORAL at 09:43

## 2022-01-01 RX ADMIN — IPRATROPIUM BROMIDE 2 PUFF: 17 AEROSOL, METERED RESPIRATORY (INHALATION) at 12:49

## 2022-01-01 RX ADMIN — LAMOTRIGINE 400 MG: 100 TABLET ORAL at 08:36

## 2022-01-01 RX ADMIN — ALBUTEROL SULFATE 2 PUFF: 90 AEROSOL, METERED RESPIRATORY (INHALATION) at 11:05

## 2022-01-01 RX ADMIN — PROPOFOL 45 MCG/KG/MIN: 10 INJECTION, EMULSION INTRAVENOUS at 15:22

## 2022-01-01 RX ADMIN — PROPOFOL 70 MCG/KG/MIN: 10 INJECTION, EMULSION INTRAVENOUS at 01:15

## 2022-01-01 RX ADMIN — ALBUTEROL SULFATE 2 PUFF: 90 AEROSOL, METERED RESPIRATORY (INHALATION) at 07:09

## 2022-01-01 RX ADMIN — ALBUTEROL SULFATE 2 PUFF: 90 AEROSOL, METERED RESPIRATORY (INHALATION) at 07:43

## 2022-01-01 RX ADMIN — ALBUTEROL SULFATE 2 PUFF: 90 AEROSOL, METERED RESPIRATORY (INHALATION) at 15:18

## 2022-01-01 RX ADMIN — MIDODRINE HYDROCHLORIDE 10 MG: 5 TABLET ORAL at 10:16

## 2022-01-01 RX ADMIN — ENOXAPARIN SODIUM 80 MG: 100 INJECTION SUBCUTANEOUS at 09:15

## 2022-01-01 RX ADMIN — CHLORHEXIDINE GLUCONATE 0.12% ORAL RINSE 15 ML: 1.2 LIQUID ORAL at 19:56

## 2022-01-01 RX ADMIN — FAMOTIDINE 20 MG: 10 INJECTION, SOLUTION INTRAVENOUS at 20:37

## 2022-01-01 RX ADMIN — ENOXAPARIN SODIUM 30 MG: 100 INJECTION SUBCUTANEOUS at 21:07

## 2022-01-01 RX ADMIN — PROPOFOL 35 MCG/KG/MIN: 10 INJECTION, EMULSION INTRAVENOUS at 19:55

## 2022-01-01 RX ADMIN — DILTIAZEM HYDROCHLORIDE 30 MG: 30 TABLET, FILM COATED ORAL at 18:07

## 2022-01-01 RX ADMIN — ENOXAPARIN SODIUM 30 MG: 100 INJECTION SUBCUTANEOUS at 09:03

## 2022-01-01 RX ADMIN — IPRATROPIUM BROMIDE 2 PUFF: 17 AEROSOL, METERED RESPIRATORY (INHALATION) at 07:06

## 2022-01-01 RX ADMIN — TOPIRAMATE 350 MG: 100 TABLET, FILM COATED ORAL at 22:19

## 2022-01-01 RX ADMIN — METHYLPREDNISOLONE SODIUM SUCCINATE 40 MG: 40 INJECTION, POWDER, FOR SOLUTION INTRAMUSCULAR; INTRAVENOUS at 03:33

## 2022-01-01 RX ADMIN — CLOBAZAM 20 MG: 10 TABLET ORAL at 22:24

## 2022-01-01 RX ADMIN — CLOBAZAM 20 MG: 10 TABLET ORAL at 09:15

## 2022-01-01 RX ADMIN — VANCOMYCIN HYDROCHLORIDE 1250 MG: 5 INJECTION, POWDER, LYOPHILIZED, FOR SOLUTION INTRAVENOUS at 12:30

## 2022-01-01 RX ADMIN — CHLORHEXIDINE GLUCONATE 0.12% ORAL RINSE 15 ML: 1.2 LIQUID ORAL at 21:45

## 2022-01-01 RX ADMIN — TOPIRAMATE 200 MG: 100 TABLET, FILM COATED ORAL at 22:24

## 2022-01-01 RX ADMIN — TOPIRAMATE 200 MG: 100 TABLET, FILM COATED ORAL at 21:28

## 2022-01-01 RX ADMIN — POTASSIUM BICARBONATE 40 MEQ: 782 TABLET, EFFERVESCENT ORAL at 12:18

## 2022-01-01 RX ADMIN — Medication 2000 UNITS: at 09:16

## 2022-01-01 RX ADMIN — AZITHROMYCIN MONOHYDRATE 500 MG: 500 INJECTION, POWDER, LYOPHILIZED, FOR SOLUTION INTRAVENOUS at 00:34

## 2022-01-01 RX ADMIN — SODIUM CHLORIDE, PRESERVATIVE FREE 10 ML: 5 INJECTION INTRAVENOUS at 22:19

## 2022-01-01 RX ADMIN — LAMOTRIGINE 150 MG: 100 TABLET ORAL at 09:09

## 2022-01-01 RX ADMIN — TOPIRAMATE 200 MG: 100 TABLET, FILM COATED ORAL at 08:51

## 2022-01-01 RX ADMIN — MEROPENEM 1000 MG: 1 INJECTION INTRAVENOUS at 21:31

## 2022-01-01 RX ADMIN — LAMOTRIGINE 150 MG: 100 TABLET ORAL at 11:35

## 2022-01-01 RX ADMIN — CLOBAZAM 20 MG: 10 TABLET ORAL at 21:11

## 2022-01-01 RX ADMIN — SODIUM CHLORIDE, PRESERVATIVE FREE 10 ML: 5 INJECTION INTRAVENOUS at 08:49

## 2022-01-01 RX ADMIN — FAMOTIDINE 20 MG: 10 INJECTION, SOLUTION INTRAVENOUS at 21:11

## 2022-01-01 RX ADMIN — ENOXAPARIN SODIUM 80 MG: 100 INJECTION SUBCUTANEOUS at 08:41

## 2022-01-01 RX ADMIN — MIDODRINE HYDROCHLORIDE 10 MG: 5 TABLET ORAL at 17:41

## 2022-01-01 RX ADMIN — IPRATROPIUM BROMIDE 2 PUFF: 17 AEROSOL, METERED RESPIRATORY (INHALATION) at 10:55

## 2022-01-01 RX ADMIN — PROPOFOL 40 MCG/KG/MIN: 10 INJECTION, EMULSION INTRAVENOUS at 22:30

## 2022-01-01 RX ADMIN — VANCOMYCIN HYDROCHLORIDE 1250 MG: 5 INJECTION, POWDER, LYOPHILIZED, FOR SOLUTION INTRAVENOUS at 10:51

## 2022-01-01 RX ADMIN — Medication 2000 UNITS: at 08:54

## 2022-01-01 RX ADMIN — FAMOTIDINE 20 MG: 10 INJECTION, SOLUTION INTRAVENOUS at 20:14

## 2022-01-01 RX ADMIN — AZITHROMYCIN MONOHYDRATE 500 MG: 500 INJECTION, POWDER, LYOPHILIZED, FOR SOLUTION INTRAVENOUS at 02:48

## 2022-01-01 RX ADMIN — PROPOFOL 70 MCG/KG/MIN: 10 INJECTION, EMULSION INTRAVENOUS at 09:42

## 2022-01-01 RX ADMIN — PROPOFOL 45 MCG/KG/MIN: 10 INJECTION, EMULSION INTRAVENOUS at 16:09

## 2022-01-01 RX ADMIN — IPRATROPIUM BROMIDE 2 PUFF: 17 AEROSOL, METERED RESPIRATORY (INHALATION) at 11:34

## 2022-01-01 RX ADMIN — CALCIUM GLUCONATE 1000 MG: 20 INJECTION, SOLUTION INTRAVENOUS at 10:30

## 2022-01-01 RX ADMIN — METHYLPREDNISOLONE SODIUM SUCCINATE 60 MG: 125 INJECTION, POWDER, FOR SOLUTION INTRAMUSCULAR; INTRAVENOUS at 14:39

## 2022-01-01 RX ADMIN — ALBUTEROL SULFATE 2 PUFF: 90 AEROSOL, METERED RESPIRATORY (INHALATION) at 07:38

## 2022-01-01 RX ADMIN — ENOXAPARIN SODIUM 80 MG: 100 INJECTION SUBCUTANEOUS at 20:16

## 2022-01-01 RX ADMIN — IPRATROPIUM BROMIDE 2 PUFF: 17 AEROSOL, METERED RESPIRATORY (INHALATION) at 11:11

## 2022-01-01 RX ADMIN — ALBUTEROL SULFATE 2 PUFF: 90 AEROSOL, METERED RESPIRATORY (INHALATION) at 19:03

## 2022-01-01 RX ADMIN — FAMOTIDINE 20 MG: 10 INJECTION, SOLUTION INTRAVENOUS at 21:28

## 2022-01-01 RX ADMIN — Medication 500 MG: at 21:44

## 2022-01-01 RX ADMIN — INSULIN LISPRO 2 UNITS: 100 INJECTION, SOLUTION INTRAVENOUS; SUBCUTANEOUS at 16:29

## 2022-01-01 RX ADMIN — SERTRALINE HYDROCHLORIDE 25 MG: 50 TABLET ORAL at 10:16

## 2022-01-01 RX ADMIN — IPRATROPIUM BROMIDE 2 PUFF: 17 AEROSOL, METERED RESPIRATORY (INHALATION) at 20:05

## 2022-01-01 RX ADMIN — MEROPENEM 1000 MG: 1 INJECTION INTRAVENOUS at 17:58

## 2022-01-01 RX ADMIN — GUAIFENESIN 600 MG: 600 TABLET, EXTENDED RELEASE ORAL at 09:15

## 2022-01-01 RX ADMIN — MIDODRINE HYDROCHLORIDE 5 MG: 5 TABLET ORAL at 11:38

## 2022-01-01 RX ADMIN — ACETYLCYSTEINE 400 MG: 200 SOLUTION ORAL; RESPIRATORY (INHALATION) at 19:28

## 2022-01-01 RX ADMIN — PROPOFOL 60 MCG/KG/MIN: 10 INJECTION, EMULSION INTRAVENOUS at 05:41

## 2022-01-01 RX ADMIN — SODIUM CHLORIDE, PRESERVATIVE FREE 10 ML: 5 INJECTION INTRAVENOUS at 23:00

## 2022-01-01 RX ADMIN — AZITHROMYCIN MONOHYDRATE 500 MG: 500 INJECTION, POWDER, LYOPHILIZED, FOR SOLUTION INTRAVENOUS at 01:11

## 2022-01-01 RX ADMIN — LAMOTRIGINE 150 MG: 100 TABLET ORAL at 21:28

## 2022-01-01 RX ADMIN — VANCOMYCIN HYDROCHLORIDE 1250 MG: 5 INJECTION, POWDER, LYOPHILIZED, FOR SOLUTION INTRAVENOUS at 01:19

## 2022-01-01 RX ADMIN — PROPOFOL 45 MCG/KG/MIN: 10 INJECTION, EMULSION INTRAVENOUS at 19:02

## 2022-01-01 RX ADMIN — DEXAMETHASONE SODIUM PHOSPHATE 6 MG: 10 INJECTION INTRAMUSCULAR; INTRAVENOUS at 09:33

## 2022-01-01 RX ADMIN — PROPOFOL 65 MCG/KG/MIN: 10 INJECTION, EMULSION INTRAVENOUS at 09:50

## 2022-01-01 RX ADMIN — ENOXAPARIN SODIUM 30 MG: 100 INJECTION SUBCUTANEOUS at 08:12

## 2022-01-01 RX ADMIN — ALBUTEROL SULFATE 2 PUFF: 90 AEROSOL, METERED RESPIRATORY (INHALATION) at 07:57

## 2022-01-01 RX ADMIN — LAMOTRIGINE 400 MG: 100 TABLET ORAL at 20:37

## 2022-01-01 RX ADMIN — PROPOFOL 35 MCG/KG/MIN: 10 INJECTION, EMULSION INTRAVENOUS at 23:28

## 2022-01-01 RX ADMIN — ALBUTEROL SULFATE 2 PUFF: 90 AEROSOL, METERED RESPIRATORY (INHALATION) at 07:54

## 2022-01-01 RX ADMIN — TOPIRAMATE 200 MG: 100 TABLET, FILM COATED ORAL at 20:37

## 2022-01-01 RX ADMIN — IPRATROPIUM BROMIDE 2 PUFF: 17 AEROSOL, METERED RESPIRATORY (INHALATION) at 15:43

## 2022-01-01 RX ADMIN — CLOBAZAM 20 MG: 10 TABLET ORAL at 20:14

## 2022-01-01 RX ADMIN — ENOXAPARIN SODIUM 80 MG: 100 INJECTION SUBCUTANEOUS at 21:44

## 2022-01-01 RX ADMIN — IPRATROPIUM BROMIDE 2 PUFF: 17 AEROSOL, METERED RESPIRATORY (INHALATION) at 08:16

## 2022-01-01 RX ADMIN — Medication 2000 UNITS: at 10:19

## 2022-01-01 RX ADMIN — PROPOFOL 80 MCG/KG/MIN: 10 INJECTION, EMULSION INTRAVENOUS at 22:30

## 2022-01-01 RX ADMIN — Medication 2000 UNITS: at 09:49

## 2022-01-01 RX ADMIN — PROPOFOL 45 MCG/KG/MIN: 10 INJECTION, EMULSION INTRAVENOUS at 14:15

## 2022-01-01 RX ADMIN — SODIUM CHLORIDE: 4.5 INJECTION, SOLUTION INTRAVENOUS at 17:14

## 2022-01-01 RX ADMIN — MIDODRINE HYDROCHLORIDE 5 MG: 5 TABLET ORAL at 08:38

## 2022-01-01 RX ADMIN — DEXAMETHASONE SODIUM PHOSPHATE 6 MG: 10 INJECTION INTRAMUSCULAR; INTRAVENOUS at 09:04

## 2022-01-01 RX ADMIN — DEXAMETHASONE SODIUM PHOSPHATE 6 MG: 10 INJECTION INTRAMUSCULAR; INTRAVENOUS at 09:46

## 2022-01-01 RX ADMIN — MIDODRINE HYDROCHLORIDE 10 MG: 5 TABLET ORAL at 09:35

## 2022-01-01 RX ADMIN — MEROPENEM 1000 MG: 1 INJECTION INTRAVENOUS at 18:52

## 2022-01-01 RX ADMIN — ATORVASTATIN CALCIUM 80 MG: 80 TABLET, FILM COATED ORAL at 20:55

## 2022-01-01 RX ADMIN — CISATRACURIUM BESYLATE 3 MCG/KG/MIN: 200 INJECTION INTRAVENOUS at 11:46

## 2022-01-01 RX ADMIN — ALBUTEROL SULFATE 2 PUFF: 90 AEROSOL, METERED RESPIRATORY (INHALATION) at 15:05

## 2022-01-01 RX ADMIN — ENOXAPARIN SODIUM 30 MG: 100 INJECTION SUBCUTANEOUS at 09:24

## 2022-01-01 RX ADMIN — GUAIFENESIN 600 MG: 600 TABLET, EXTENDED RELEASE ORAL at 09:14

## 2022-01-01 RX ADMIN — MEROPENEM 1000 MG: 1 INJECTION INTRAVENOUS at 18:19

## 2022-01-01 RX ADMIN — SODIUM CHLORIDE, PRESERVATIVE FREE 10 ML: 5 INJECTION INTRAVENOUS at 06:53

## 2022-01-01 RX ADMIN — Medication 138 MCG/HR: at 03:25

## 2022-01-01 RX ADMIN — ENOXAPARIN SODIUM 30 MG: 100 INJECTION SUBCUTANEOUS at 09:46

## 2022-01-01 RX ADMIN — LAMOTRIGINE 400 MG: 100 TABLET ORAL at 09:37

## 2022-01-01 RX ADMIN — IPRATROPIUM BROMIDE 2 PUFF: 17 AEROSOL, METERED RESPIRATORY (INHALATION) at 15:53

## 2022-01-01 RX ADMIN — PROPOFOL 50 MCG/KG/MIN: 10 INJECTION, EMULSION INTRAVENOUS at 18:12

## 2022-01-01 RX ADMIN — AMIODARONE HYDROCHLORIDE 1 MG/MIN: 50 INJECTION, SOLUTION INTRAVENOUS at 19:34

## 2022-01-01 RX ADMIN — SODIUM CHLORIDE, PRESERVATIVE FREE 10 ML: 5 INJECTION INTRAVENOUS at 20:18

## 2022-01-01 RX ADMIN — IPRATROPIUM BROMIDE 2 PUFF: 17 AEROSOL, METERED RESPIRATORY (INHALATION) at 20:00

## 2022-01-01 RX ADMIN — Medication 62.5 MCG/HR: at 14:28

## 2022-01-01 RX ADMIN — ALBUTEROL SULFATE 2 PUFF: 90 AEROSOL, METERED RESPIRATORY (INHALATION) at 15:54

## 2022-01-01 RX ADMIN — SODIUM CHLORIDE 1000 MG: 9 INJECTION, SOLUTION INTRAVENOUS at 09:34

## 2022-01-01 RX ADMIN — VANCOMYCIN HYDROCHLORIDE 1250 MG: 5 INJECTION, POWDER, LYOPHILIZED, FOR SOLUTION INTRAVENOUS at 00:53

## 2022-01-01 RX ADMIN — DEXAMETHASONE SODIUM PHOSPHATE 6 MG: 10 INJECTION INTRAMUSCULAR; INTRAVENOUS at 09:03

## 2022-01-01 RX ADMIN — AZITHROMYCIN MONOHYDRATE 500 MG: 500 INJECTION, POWDER, LYOPHILIZED, FOR SOLUTION INTRAVENOUS at 01:14

## 2022-01-01 RX ADMIN — FAMOTIDINE 20 MG: 10 INJECTION, SOLUTION INTRAVENOUS at 09:43

## 2022-01-01 RX ADMIN — IOPAMIDOL 75 ML: 755 INJECTION, SOLUTION INTRAVENOUS at 06:03

## 2022-01-01 RX ADMIN — DILTIAZEM HYDROCHLORIDE 30 MG: 30 TABLET, FILM COATED ORAL at 00:14

## 2022-01-01 RX ADMIN — SODIUM CHLORIDE, PRESERVATIVE FREE 10 ML: 5 INJECTION INTRAVENOUS at 09:47

## 2022-01-01 RX ADMIN — PROPOFOL 60 MCG/KG/MIN: 10 INJECTION, EMULSION INTRAVENOUS at 09:03

## 2022-01-01 RX ADMIN — LAMOTRIGINE 150 MG: 100 TABLET ORAL at 22:09

## 2022-01-01 RX ADMIN — Medication 500 MG: at 09:06

## 2022-01-01 RX ADMIN — METHYLPREDNISOLONE SODIUM SUCCINATE 60 MG: 125 INJECTION, POWDER, FOR SOLUTION INTRAMUSCULAR; INTRAVENOUS at 12:28

## 2022-01-01 RX ADMIN — MEROPENEM 1000 MG: 1 INJECTION INTRAVENOUS at 18:57

## 2022-01-01 RX ADMIN — SERTRALINE HYDROCHLORIDE 25 MG: 50 TABLET ORAL at 08:51

## 2022-01-01 RX ADMIN — CHLORHEXIDINE GLUCONATE 0.12% ORAL RINSE 15 ML: 1.2 LIQUID ORAL at 20:14

## 2022-01-01 RX ADMIN — PROPOFOL 45 MCG/KG/MIN: 10 INJECTION, EMULSION INTRAVENOUS at 07:31

## 2022-01-01 RX ADMIN — PROPOFOL 40 MCG/KG/MIN: 10 INJECTION, EMULSION INTRAVENOUS at 23:42

## 2022-01-01 RX ADMIN — METHYLPREDNISOLONE SODIUM SUCCINATE 125 MG: 125 INJECTION, POWDER, FOR SOLUTION INTRAMUSCULAR; INTRAVENOUS at 21:34

## 2022-01-01 RX ADMIN — PROPOFOL 80 MCG/KG/MIN: 10 INJECTION, EMULSION INTRAVENOUS at 23:03

## 2022-01-01 RX ADMIN — IPRATROPIUM BROMIDE 2 PUFF: 17 AEROSOL, METERED RESPIRATORY (INHALATION) at 07:23

## 2022-01-01 RX ADMIN — CLOBAZAM 20 MG: 10 TABLET ORAL at 20:30

## 2022-01-01 RX ADMIN — CHLORHEXIDINE GLUCONATE 0.12% ORAL RINSE 15 ML: 1.2 LIQUID ORAL at 22:46

## 2022-01-01 RX ADMIN — CHLORHEXIDINE GLUCONATE 0.12% ORAL RINSE 15 ML: 1.2 LIQUID ORAL at 20:30

## 2022-01-01 RX ADMIN — FLUCONAZOLE 400 MG: 200 INJECTION, SOLUTION INTRAVENOUS at 16:42

## 2022-01-01 RX ADMIN — Medication 62.5 MCG/HR: at 12:24

## 2022-01-01 RX ADMIN — PROPOFOL 45 MCG/KG/MIN: 10 INJECTION, EMULSION INTRAVENOUS at 23:42

## 2022-01-01 RX ADMIN — LAMOTRIGINE 150 MG: 100 TABLET ORAL at 20:39

## 2022-01-01 RX ADMIN — SODIUM CHLORIDE, PRESERVATIVE FREE 10 ML: 5 INJECTION INTRAVENOUS at 09:16

## 2022-01-01 RX ADMIN — MEROPENEM 1000 MG: 1 INJECTION INTRAVENOUS at 05:06

## 2022-01-01 RX ADMIN — METHYLPREDNISOLONE SODIUM SUCCINATE 60 MG: 125 INJECTION, POWDER, FOR SOLUTION INTRAMUSCULAR; INTRAVENOUS at 18:20

## 2022-01-01 RX ADMIN — METHYLPREDNISOLONE SODIUM SUCCINATE 60 MG: 125 INJECTION, POWDER, FOR SOLUTION INTRAMUSCULAR; INTRAVENOUS at 20:44

## 2022-01-01 RX ADMIN — METHYLPREDNISOLONE SODIUM SUCCINATE 60 MG: 125 INJECTION, POWDER, FOR SOLUTION INTRAMUSCULAR; INTRAVENOUS at 02:20

## 2022-01-01 RX ADMIN — ENOXAPARIN SODIUM 80 MG: 100 INJECTION SUBCUTANEOUS at 08:00

## 2022-01-01 RX ADMIN — ALBUTEROL SULFATE 2 PUFF: 90 AEROSOL, METERED RESPIRATORY (INHALATION) at 00:00

## 2022-01-01 RX ADMIN — PANTOPRAZOLE SODIUM 40 MG: 40 INJECTION, POWDER, FOR SOLUTION INTRAVENOUS at 10:00

## 2022-01-01 RX ADMIN — Medication 500 MG: at 20:48

## 2022-01-01 RX ADMIN — FAMOTIDINE 20 MG: 10 INJECTION, SOLUTION INTRAVENOUS at 08:38

## 2022-01-01 RX ADMIN — CLOBAZAM 20 MG: 10 TABLET ORAL at 22:20

## 2022-01-01 RX ADMIN — PROPOFOL 35 MCG/KG/MIN: 10 INJECTION, EMULSION INTRAVENOUS at 01:51

## 2022-01-01 RX ADMIN — CEFTRIAXONE SODIUM 1000 MG: 1 INJECTION, POWDER, FOR SOLUTION INTRAMUSCULAR; INTRAVENOUS at 00:37

## 2022-01-01 RX ADMIN — FLUCONAZOLE 800 MG: 400 INJECTION, SOLUTION INTRAVENOUS at 16:31

## 2022-01-01 RX ADMIN — CHLORHEXIDINE GLUCONATE 0.12% ORAL RINSE 15 ML: 1.2 LIQUID ORAL at 07:52

## 2022-01-01 RX ADMIN — MEROPENEM 1000 MG: 1 INJECTION INTRAVENOUS at 03:30

## 2022-01-01 RX ADMIN — PROPOFOL 40 MCG/KG/MIN: 10 INJECTION, EMULSION INTRAVENOUS at 03:37

## 2022-01-01 RX ADMIN — ACETAMINOPHEN 650 MG: 325 TABLET ORAL at 10:21

## 2022-01-01 RX ADMIN — MEROPENEM 1000 MG: 1 INJECTION INTRAVENOUS at 11:25

## 2022-01-01 RX ADMIN — PROPOFOL 70 MCG/KG/MIN: 10 INJECTION, EMULSION INTRAVENOUS at 06:46

## 2022-01-01 ASSESSMENT — PULMONARY FUNCTION TESTS
PIF_VALUE: 33
PIF_VALUE: 31
PIF_VALUE: 36
PIF_VALUE: 36
PIF_VALUE: 30
PIF_VALUE: 32
PIF_VALUE: 38
PIF_VALUE: 32
PIF_VALUE: 36
PIF_VALUE: 37
PIF_VALUE: 32
PIF_VALUE: 36
PIF_VALUE: 37
PIF_VALUE: 36
PIF_VALUE: 36
PIF_VALUE: 26
PIF_VALUE: 34
PIF_VALUE: 32
PIF_VALUE: 36
PIF_VALUE: 47
PIF_VALUE: 36
PIF_VALUE: 36
PIF_VALUE: 32
PIF_VALUE: 32
PIF_VALUE: 34
PIF_VALUE: 36
PIF_VALUE: 31
PIF_VALUE: 47
PIF_VALUE: 31
PIF_VALUE: 32
PIF_VALUE: 36
PIF_VALUE: 36
PIF_VALUE: 32
PIF_VALUE: 30
PIF_VALUE: 34
PIF_VALUE: 34
PIF_VALUE: 33
PIF_VALUE: 30
PIF_VALUE: 36
PIF_VALUE: 32
PIF_VALUE: 27
PIF_VALUE: 36
PIF_VALUE: 38
PIF_VALUE: 33
PIF_VALUE: 37
PIF_VALUE: 37
PIF_VALUE: 32
PIF_VALUE: 32
PIF_VALUE: 31
PIF_VALUE: 29
PIF_VALUE: 32
PIF_VALUE: 32
PIF_VALUE: 36
PIF_VALUE: 36
PIF_VALUE: 35
PIF_VALUE: 32
PIF_VALUE: 36
PIF_VALUE: 31
PIF_VALUE: 33
PIF_VALUE: 36
PIF_VALUE: 36
PIF_VALUE: 33
PIF_VALUE: 32
PIF_VALUE: 32
PIF_VALUE: 33
PIF_VALUE: 36
PIF_VALUE: 36
PIF_VALUE: 34
PIF_VALUE: 33
PIF_VALUE: 32
PIF_VALUE: 31
PIF_VALUE: 33
PIF_VALUE: 31
PIF_VALUE: 37
PIF_VALUE: 36
PIF_VALUE: 35
PIF_VALUE: 31
PIF_VALUE: 33
PIF_VALUE: 26
PIF_VALUE: 36
PIF_VALUE: 37
PIF_VALUE: 32
PIF_VALUE: 36
PIF_VALUE: 31
PIF_VALUE: 31
PIF_VALUE: 32
PIF_VALUE: 36
PIF_VALUE: 33
PIF_VALUE: 32
PIF_VALUE: 36
PIF_VALUE: 36
PIF_VALUE: 34
PIF_VALUE: 33
PIF_VALUE: 37
PIF_VALUE: 36
PIF_VALUE: 32
PIF_VALUE: 32
PIF_VALUE: 36
PIF_VALUE: 36
PIF_VALUE: 32
PIF_VALUE: 33
PIF_VALUE: 32
PIF_VALUE: 36
PIF_VALUE: 31
PIF_VALUE: 29
PIF_VALUE: 31
PIF_VALUE: 33
PIF_VALUE: 32
PIF_VALUE: 32
PIF_VALUE: 31
PIF_VALUE: 36
PIF_VALUE: 32
PIF_VALUE: 37
PIF_VALUE: 34
PIF_VALUE: 32
PIF_VALUE: 32
PIF_VALUE: 35
PIF_VALUE: 32
PIF_VALUE: 32
PIF_VALUE: 34
PIF_VALUE: 32
PIF_VALUE: 32
PIF_VALUE: 36
PIF_VALUE: 31
PIF_VALUE: 36
PIF_VALUE: 33
PIF_VALUE: 36
PIF_VALUE: 35
PIF_VALUE: 32
PIF_VALUE: 34
PIF_VALUE: 32
PIF_VALUE: 33
PIF_VALUE: 26
PIF_VALUE: 32
PIF_VALUE: 31
PIF_VALUE: 32
PIF_VALUE: 35
PIF_VALUE: 37
PIF_VALUE: 32
PIF_VALUE: 36
PIF_VALUE: 32
PIF_VALUE: 36
PIF_VALUE: 32
PIF_VALUE: 36
PIF_VALUE: 36
PIF_VALUE: 31
PIF_VALUE: 26
PIF_VALUE: 36
PIF_VALUE: 32
PIF_VALUE: 36
PIF_VALUE: 32
PIF_VALUE: 36
PIF_VALUE: 37
PIF_VALUE: 33
PIF_VALUE: 31
PIF_VALUE: 37
PIF_VALUE: 34
PIF_VALUE: 32
PIF_VALUE: 36
PIF_VALUE: 37
PIF_VALUE: 33
PIF_VALUE: 36
PIF_VALUE: 32
PIF_VALUE: 36
PIF_VALUE: 31
PIF_VALUE: 36
PIF_VALUE: 31
PIF_VALUE: 28
PIF_VALUE: 36
PIF_VALUE: 32
PIF_VALUE: 37
PIF_VALUE: 26
PIF_VALUE: 32
PIF_VALUE: 31
PIF_VALUE: 29
PIF_VALUE: 35
PIF_VALUE: 26
PIF_VALUE: 36
PIF_VALUE: 37
PIF_VALUE: 36
PIF_VALUE: 36
PIF_VALUE: 33
PIF_VALUE: 32
PIF_VALUE: 35
PIF_VALUE: 37
PIF_VALUE: 31
PIF_VALUE: 32
PIF_VALUE: 32
PIF_VALUE: 38
PIF_VALUE: 29
PIF_VALUE: 32
PIF_VALUE: 36
PIF_VALUE: 31
PIF_VALUE: 32
PIF_VALUE: 33
PIF_VALUE: 36
PIF_VALUE: 31
PIF_VALUE: 31
PIF_VALUE: 36
PIF_VALUE: 32
PIF_VALUE: 33
PIF_VALUE: 34
PIF_VALUE: 41
PIF_VALUE: 36
PIF_VALUE: 36
PIF_VALUE: 32
PIF_VALUE: 38
PIF_VALUE: 32
PIF_VALUE: 36
PIF_VALUE: 36
PIF_VALUE: 33
PIF_VALUE: 49
PIF_VALUE: 36
PIF_VALUE: 49
PIF_VALUE: 32
PIF_VALUE: 33
PIF_VALUE: 36
PIF_VALUE: 48
PIF_VALUE: 34
PIF_VALUE: 33
PIF_VALUE: 33
PIF_VALUE: 32
PIF_VALUE: 36
PIF_VALUE: 32
PIF_VALUE: 36
PIF_VALUE: 32
PIF_VALUE: 33
PIF_VALUE: 37
PIF_VALUE: 32
PIF_VALUE: 31
PIF_VALUE: 36
PIF_VALUE: 34
PIF_VALUE: 32
PIF_VALUE: 33
PIF_VALUE: 32
PIF_VALUE: 36
PIF_VALUE: 33
PIF_VALUE: 36
PIF_VALUE: 32
PIF_VALUE: 37
PIF_VALUE: 37
PIF_VALUE: 32
PIF_VALUE: 34
PIF_VALUE: 36
PIF_VALUE: 36
PIF_VALUE: 32
PIF_VALUE: 33
PIF_VALUE: 32
PIF_VALUE: 34
PIF_VALUE: 32
PIF_VALUE: 36
PIF_VALUE: 36
PIF_VALUE: 31
PIF_VALUE: 36
PIF_VALUE: 32
PIF_VALUE: 32
PIF_VALUE: 36
PIF_VALUE: 37
PIF_VALUE: 36
PIF_VALUE: 31
PIF_VALUE: 32
PIF_VALUE: 36
PIF_VALUE: 29
PIF_VALUE: 36
PIF_VALUE: 36
PIF_VALUE: 26
PIF_VALUE: 32
PIF_VALUE: 32
PIF_VALUE: 33
PIF_VALUE: 36
PIF_VALUE: 32
PIF_VALUE: 36
PIF_VALUE: 36
PIF_VALUE: 33
PIF_VALUE: 37
PIF_VALUE: 37
PIF_VALUE: 33
PIF_VALUE: 34
PIF_VALUE: 32
PIF_VALUE: 33
PIF_VALUE: 32
PIF_VALUE: 39
PIF_VALUE: 37
PIF_VALUE: 36
PIF_VALUE: 36
PIF_VALUE: 34
PIF_VALUE: 34
PIF_VALUE: 36
PIF_VALUE: 33
PIF_VALUE: 32
PIF_VALUE: 32
PIF_VALUE: 33
PIF_VALUE: 36
PIF_VALUE: 36
PIF_VALUE: 32
PIF_VALUE: 33
PIF_VALUE: 32
PIF_VALUE: 34
PIF_VALUE: 33
PIF_VALUE: 36
PIF_VALUE: 32
PIF_VALUE: 37
PIF_VALUE: 36
PIF_VALUE: 39
PIF_VALUE: 32
PIF_VALUE: 35
PIF_VALUE: 36
PIF_VALUE: 35
PIF_VALUE: 32
PIF_VALUE: 33
PIF_VALUE: 36
PIF_VALUE: 36
PIF_VALUE: 22
PIF_VALUE: 36
PIF_VALUE: 32
PIF_VALUE: 31
PIF_VALUE: 37
PIF_VALUE: 33
PIF_VALUE: 36
PIF_VALUE: 34
PIF_VALUE: 36
PIF_VALUE: 37
PIF_VALUE: 33
PIF_VALUE: 32
PIF_VALUE: 33
PIF_VALUE: 37
PIF_VALUE: 30
PIF_VALUE: 31
PIF_VALUE: 36
PIF_VALUE: 37
PIF_VALUE: 36
PIF_VALUE: 37
PIF_VALUE: 36
PIF_VALUE: 49
PIF_VALUE: 31
PIF_VALUE: 40
PIF_VALUE: 49
PIF_VALUE: 27
PIF_VALUE: 31
PIF_VALUE: 32
PIF_VALUE: 37
PIF_VALUE: 31
PIF_VALUE: 36
PIF_VALUE: 29
PIF_VALUE: 36
PIF_VALUE: 33
PIF_VALUE: 32
PIF_VALUE: 33
PIF_VALUE: 32
PIF_VALUE: 36
PIF_VALUE: 32
PIF_VALUE: 36
PIF_VALUE: 33
PIF_VALUE: 32
PIF_VALUE: 36
PIF_VALUE: 36
PIF_VALUE: 30
PIF_VALUE: 26
PIF_VALUE: 31
PIF_VALUE: 32
PIF_VALUE: 32
PIF_VALUE: 37
PIF_VALUE: 36
PIF_VALUE: 31
PIF_VALUE: 36
PIF_VALUE: 26
PIF_VALUE: 36
PIF_VALUE: 33
PIF_VALUE: 30
PIF_VALUE: 36
PIF_VALUE: 29
PIF_VALUE: 33
PIF_VALUE: 32
PIF_VALUE: 32
PIF_VALUE: 37
PIF_VALUE: 33
PIF_VALUE: 38
PIF_VALUE: 36
PIF_VALUE: 31
PIF_VALUE: 37
PIF_VALUE: 33
PIF_VALUE: 32
PIF_VALUE: 36
PIF_VALUE: 37

## 2022-01-01 ASSESSMENT — PAIN SCALES - GENERAL
PAINLEVEL_OUTOF10: 0
PAINLEVEL_OUTOF10: 7
PAINLEVEL_OUTOF10: 0
PAINLEVEL_OUTOF10: 2
PAINLEVEL_OUTOF10: 5
PAINLEVEL_OUTOF10: 0
PAINLEVEL_OUTOF10: 7
PAINLEVEL_OUTOF10: 0

## 2022-01-01 ASSESSMENT — ENCOUNTER SYMPTOMS
EYE DISCHARGE: 0
SHORTNESS OF BREATH: 0
VOMITING: 0
ABDOMINAL PAIN: 0
RHINORRHEA: 0
EYE PAIN: 0
COUGH: 0
COUGH: 1
SORE THROAT: 0
SHORTNESS OF BREATH: 1
VOMITING: 0
NAUSEA: 0
SORE THROAT: 0
RHINORRHEA: 0
EYE REDNESS: 0
DIARRHEA: 0
BACK PAIN: 0
ABDOMINAL PAIN: 0
NAUSEA: 0
BACK PAIN: 0

## 2022-01-05 NOTE — TELEPHONE ENCOUNTER
Mother called stating patient is not getting his strength back. She would like to know if they should still be quarantining.

## 2022-01-05 NOTE — TELEPHONE ENCOUNTER
I would not count general strength as one of the symptoms for the quarantining criteria. If all the other quarantining rules are OK and the only issue is general lack of strength/energy. I would consider the quarantine over. The general strength can take longer to resolve.

## 2022-01-05 NOTE — TELEPHONE ENCOUNTER
----- Message from Darryle Frames sent at 1/4/2022  5:12 PM EST -----  Subject: Message to Provider    QUESTIONS  Information for Provider? Patient was wanting to talk to [facility]\"" about getting   an insulin bag. He is having trouble with having energy. He says he sleeps   most of the day.   ---------------------------------------------------------------------------  --------------  CALL BACK INFO  What is the best way for the office to contact you? OK to leave message on   voicemail  Preferred Call Back Phone Number? [phone]  ---------------------------------------------------------------------------  --------------  SCRIPT ANSWERS  Relationship to Patient?  Self

## 2022-01-06 NOTE — Clinical Note
Quan Barton was seen and treated in our emergency department on 1/6/2022. He may return to work on 01/10/2022. If you have any questions or concerns, please don't hesitate to call.       Saniya Mcqueen MD

## 2022-01-06 NOTE — ED PROVIDER NOTES
[address] ENCOUNTER      Pt Name: Jonnathan Gan  MRN: [id number]  [place] 1965  Date of evaluation: 1/6/2022  Provider: Renato Abad MD    CHIEF COMPLAINT       Chief Complaint   Patient presents with    Positive For Covid-19    Fatigue         HISTORY OF PRESENT ILLNESS      Jonnathan Gan is a 64 y.o. male who presents to the emergency department  for   Chief Complaint   Patient presents with    Positive For Covid-19    Fatigue       78-year-old male presents complaining of generalized weakness and fatigue. He was diagnosed with Covid-19 on [place] which was about 12 days ago. He had not been vaccinated. He states in the beginning of his Covid infection that he was having lots of coughing. He also endorsed some vomiting. He states that those symptoms have improved but he is very generally weak so comes the emergency department for evaluation. Denies any signs of bleeding. No hematuria or hematemesis or melena or hematochezia. Denies remarkable vomiting. No chest pain. Does endorse some shortness of breath both at rest and exertionally. Denies history of PE or DVT. He presents with no signs of respiratory distress. He is grossly moving extremity spontaneously. He does not identify any exacerbating or alleviating factors. Nursing Notes, Triage Notes & Vital Signs were reviewed. REVIEW OF SYSTEMS    (2-9 systems for level 4, 10 or more for level 5)     Review of Systems   Constitutional: Positive for fatigue and fever. Negative for chills. HENT: Negative for congestion, rhinorrhea and sore throat. Eyes: Negative for pain and discharge. Respiratory: Negative for cough and shortness of breath. Cardiovascular: Negative for chest pain and palpitations. Gastrointestinal: Negative for abdominal pain, nausea and vomiting. Endocrine: Negative for polydipsia and polyuria. Genitourinary: Negative for dysuria and flank pain. Musculoskeletal: Negative for back pain and neck pain. Skin: Negative for pallor and wound. Neurological: Positive for weakness. Negative for dizziness, facial asymmetry, light-headedness, numbness and headaches. Psychiatric/Behavioral: Negative for confusion. Except as noted above the remainder of the review of systems was reviewed and negative. PAST MEDICAL HISTORY     Past Medical History:   Diagnosis Date    HLD 10/21/2021    Moderate episode of recurrent major depressive disorder ([facility]     Seizures (HCC)        Prior to Admission medications    Medication Sig Start Date End Date Taking? Authorizing Provider   cloBAZam 20 MG TABS Take 1 tablet by mouth 2 times daily. 12/10/21   Historical Provider, MD   sertraline (ZOLOFT) 25 MG tablet Take 1 tablet by mouth daily 12/16/21   Jeff Aguilar MD   atorvastatin (LIPITOR) 80 MG tablet Take 1 tablet by mouth daily 11/18/21   Jeff Aguilar MD   bisacodyl (BISACODYL) 5 MG EC tablet Take 4 tablets once for colonoscopy prep 11/18/21   PATRICE Carcamo - CNP   clonazePAM (KLONOPIN) 0.5 MG disintegrating tablet Take 1 tablet by mouth 2 times daily as needed. 3/15/21   Historical Provider, MD   lamoTRIgine (LAMICTAL) 150 MG tablet Take 150 mg by mouth 2 times daily. Historical Provider, MD   topiramate (TOPAMAX) 200 MG tablet Take 200 mg by mouth 2 times daily.  Takes 200 mg in am and 300 mg in pm.    Historical Provider, MD        Patient Active Problem List   Diagnosis    History of craniotomy    Malformation of cortical development of brain [facility]    Open wedge compression fracture of T11 vertebra with routine healing    Moderate episode of recurrent major depressive disorder (HCC)    Rhinitis, allergic    S/P placement of VNS (vagus nerve stimulation) device    Stenosis of right carotid artery    Symptomatic localization-related epilepsy (HCC)    HLD    Chronic right shoulder pain    Positive FIT (fecal immunochemical test)    Open wedge compression fracture of T12 vertebra with routine healing         SURGICAL HISTORY       Past Surgical History:   Procedure Laterality Date    BRAIN SURGERY           CURRENT MEDICATIONS       Previous Medications    ATORVASTATIN (LIPITOR) 80 MG TABLET    Take 1 tablet by mouth daily    BISACODYL (BISACODYL) 5 MG EC TABLET    Take 4 tablets once for colonoscopy prep    CLOBAZAM 20 MG TABS    Take 1 tablet by mouth 2 times daily. CLONAZEPAM (KLONOPIN) 0.5 MG DISINTEGRATING TABLET    Take 1 tablet by mouth 2 times daily as needed. LAMOTRIGINE (LAMICTAL) 150 MG TABLET    Take 150 mg by mouth 2 times daily. SERTRALINE (ZOLOFT) 25 MG TABLET    Take 1 tablet by mouth daily    TOPIRAMATE (TOPAMAX) 200 MG TABLET    Take 200 mg by mouth 2 times daily. Takes 200 mg in am and 300 mg in pm.       ALLERGIES     Patient has no known allergies. FAMILY HISTORY     No family history on file. SOCIAL HISTORY       Social History     Socioeconomic History    Marital status:      Spouse name: Not on file    Number of children: Not on file    Years of education: Not on file    Highest education level: Not on file   Occupational History    Not on file   Tobacco Use    Smoking status: Never Smoker    Smokeless tobacco: Never Used   Substance and Sexual Activity    Alcohol use: No    Drug use: No    Sexual activity: Not on file   Other Topics Concern    Not on file   Social History Narrative    Not on file     Social Determinants of Health     Financial Resource Strain: Low Risk     Difficulty of Paying Living Expenses: Not hard at all   Food Insecurity: No Food Insecurity    Worried About [facility] in the Last Year: Never true    Carlos of Food in the Last Year: Never true   Transportation Needs: No Transportation Needs    Lack of Transportation (Medical): No    Lack of Transportation (Non-Medical):  No   Physical Activity:     Days of Exercise per Week: Not on file    Minutes of Exercise per Session: Not on file   Stress:     Feeling of Stress : Not on file   Social Connections:     Frequency of Communication with Friends and Family: Not on file    Frequency of Social Gatherings with Friends and Family: Not on file    Attends [employer] Services: Not on file    Active Member of [address] [place] [address] or Organizations: Not on file    Attends Club or Organization Meetings: Not on file    Marital Status: Not on file   Intimate Partner Violence:     Fear of Current or Ex-Partner: Not on file    Emotionally Abused: Not on file    Physically Abused: Not on file    Sexually Abused: Not on file   Housing Stability:     Unable to Pay for Housing in the Last Year: Not on file    Number of [place] in the Last Year: Not on file    Unstable Housing in the Last Year: Not on file       SCREENINGS               PHYSICAL EXAM    (up to 7 for level 4, 8 or more for level 5)     ED Triage Vitals [01/06/22 0431]   BP Temp Temp Source Pulse Resp SpO2 Height Weight   -- 98.4 °F (36.9 °C) Oral 70 20 94 % 6' (1.829 m) 185 lb (83.9 kg)       Physical Exam  Vitals reviewed. Constitutional:       Appearance: He is not ill-appearing or toxic-appearing. HENT:      Head: Normocephalic and atraumatic. Nose: No congestion or rhinorrhea. Mouth/Throat:      Mouth: Mucous membranes are moist.      Pharynx: No oropharyngeal exudate or posterior oropharyngeal erythema. Eyes:      General:         Right eye: No discharge. Left eye: No discharge. Pupils: Pupils are equal, round, and reactive to light. Cardiovascular:      Rate and Rhythm: Normal rate. Pulses: Normal pulses. Pulmonary:      Effort: No respiratory distress. Breath sounds: No wheezing. Chest:      Chest wall: No tenderness. Abdominal:      Palpations: Abdomen is soft. Tenderness: There is no abdominal tenderness.  There is no right CVA tenderness. Musculoskeletal:         General: No swelling or tenderness. Normal range of motion. Cervical back: Normal range of motion and neck supple. No tenderness. Skin:     General: Skin is warm. Capillary Refill: Capillary refill takes less than 2 seconds. Neurological:      General: No focal deficit present. Mental Status: He is alert and oriented to person, place, and time. DIAGNOSTIC RESULTS     Labs Reviewed   COMPREHENSIVE METABOLIC PANEL W/ REFLEX TO MG FOR LOW K   CBC WITH AUTO DIFFERENTIAL        RADIOLOGY:     Non-plain film images such as CT, Ultrasound and MRI are read by the radiologist. Plain radiographic images are visualized and preliminarily interpreted by the emergency physician. Interpretation per the Radiologist below, if available at the time of this note:    XR CHEST PORTABLE    (Results Pending)   CTA PULMONARY W CONTRAST    (Results Pending)         ED BEDSIDE ULTRASOUND:   Performed by ED Physician Benji Castro MD       LABS:  Labs Reviewed   COMPREHENSIVE METABOLIC PANEL W/ REFLEX TO MG FOR LOW K   CBC WITH AUTO DIFFERENTIAL       All other labs were within normal range or not returned as of this dictation. EMERGENCY DEPARTMENT COURSE and DIFFERENTIAL DIAGNOSIS/MDM:   Vitals:    Vitals:    01/06/22 0431   Pulse: 70   Resp: 20   Temp: 98.4 °F (36.9 °C)   TempSrc: Oral   SpO2: 94%   Weight: 185 lb (83.9 kg)   Height: 6' (1.829 m)           MDM  Number of Diagnoses or Management Options  Other fatigue  Pneumonia due to COVID-19 virus  Transaminitis  Diagnosis management comments: 59-year-old male presents complaining of generalized weakness and fatigue. Was diagnosed with Covid-19 on [place] which was about 12 days ago. He states he is having some exertional shortness of breath. He presents by squad to the emergency department. He does not require any supplemental oxygen at this time. Presents with unremarkable vital signs.   No tachycardia. He is afebrile. Active saturations are in the mid 90s on room air. Physical exam is overall nonacute. Labs as well as CT pulmonary arteries are obtained. His labs do show elevations in his liver enzymes which do appear to be new. His CT scans are pending this time. Did add on an abdominal CT scan. Those be followed by oncoming physician will review scans make final disposition. Amount and/or Complexity of Data Reviewed  Decide to obtain previous medical records or to obtain history from someone other than the patient: yes        -  Patient seen and evaluated in the emergency department. -  Triage and nursing notes reviewed and incorporated. -  Old chart records reviewed and incorporated. -  Work-up included:  See above  -  Results discussed with patient. CONSULTS:  None    PROCEDURES:  None performed unless otherwise noted below     Procedures        FINAL IMPRESSION      1. Other fatigue          DISPOSITION/PLAN   DISPOSITION        PATIENT REFERRED TO:  Susanne Jiménez MD  [address]  [facility] [address] [phone]            DISCHARGE MEDICATIONS:  New Prescriptions    No medications on file       ED Provider Disposition Time  DISPOSITION        Appropriate personal protective equipment was worn during the patient's evaluation. These included surgical, eye protection, surgical mask or in 95 respirator and gloves. The patient was also placed in a surgical mask for the prevention of possible spread of respiratory viral illnesses. The Patient was instructed to read the package inserts with any medication that was prescribed. Major potential reactions and medication interactions were discussed. The Patient understands that there are numerous possible adverse reactions not covered. The patient was also instructed to arrange follow-up with his or her primary care provider for review of any pending labwork or incidental findings on any radiology results that were obtained. All efforts were made to discuss any incidental findings that require further monitoring. Controlled Substances Monitoring:     No flowsheet data found.     (Please note that portions of this note were completed with a voice recognition program.  Efforts were made to edit the dictations but occasionally words are mis-transcribed.)    Mac Carter MD (electronically signed)  Attending Emergency Physician           Mac Carter MD  01/06/22 9788

## 2022-01-06 NOTE — ED NOTES
Bed: ED-29  Expected date:   Expected time:   Means of arrival:   Comments:  [id number] No. Betty Herr RN  01/06/22 9668

## 2022-01-06 NOTE — ED PROVIDER NOTES
1/6:    I assumed care of this patient from the previous physician. Please see her note for earlier information an details. Upon my evaluation the patient has no complaints at all and is requesting to be discharged home. CTA chest:     1. No CT evidence for acute pulmonary embolus. 2. Extensive COVID pneumonia.  Follow-up to resolution is recommended. CT ABD/Pelvis:  Extensive bilateral lower lobe ground-glass pneumonia was noted without   pleural effusion, compatible with COVID pneumonitis.       Small fixed hiatal hernia.       No renal mass, hydronephrosis, renal or ureteral calculi.       No colitis, diverticulitis or appendicitis.       No mass, ascites or lymphadenopathy.       Moderate anterior compression involves the superior vertebral endplate of   K76, age unknown. Amara Daniel was not present on a CT lumbar spine dated 09/08/2017. I did review patient's imaging studies and laboratory work as noted above. On reevaluation patient was resting comfortably. I discussed the findings with him. He does have slightly elevated transaminases no other acute abnormality appreciated at this time. CT abdomen pelvis does not show any acute intra-abdominal process. Compression fracture noted of unclear chronology. Patient's denies any new back pain or paresthesias in lower legs. Patient does have extensive Covid pneumonitis. Oxygen saturation has been in the 90s. Denies any current complaints. Recommend close follow-up with primary care physician referral physician next 24 to 48 hours return to the emergency department for chest pain, shortness breath, fevers, weakness, any other concerning symptoms    Clinical impression 1 Covid pneumonitis 2. Thoracic compression fracture 3.   Shyla Cunningham MD  01/06/22 8183

## 2022-01-06 NOTE — Clinical Note
Jolie Diana was seen and treated in our emergency department on 1/6/2022. He may return to work on 01/10/2022. If you have any questions or concerns, please don't hesitate to call.       Santi Bob MD

## 2022-01-07 PROBLEM — U07.1 PNEUMONIA DUE TO COVID-19 VIRUS: Status: ACTIVE | Noted: 2022-01-01

## 2022-01-07 PROBLEM — J12.82 PNEUMONIA DUE TO COVID-19 VIRUS: Status: ACTIVE | Noted: 2022-01-01

## 2022-01-07 NOTE — ED NOTES
Bed: ED-18  Expected date:   Expected time:   Means of arrival:   Comments:  COVID +    77% on room air.       Eliu Ortiz RN  01/07/22 3525

## 2022-01-07 NOTE — ED NOTES
Pt sister Florencia Dempsey here is concerned that pt is too ill to come home. He lives with their [de-identified]year old parents and they cannot care for him. She would like a case management consult and for us to provide her with an update. [phone]. She is also concerned that pt has been depressed, his wife and children left him and he has been very depressed since.           Jennifer Wiley RN  01/07/22 5887

## 2022-01-07 NOTE — ED NOTES
Patients sister Mark Marrero updated on patients plan for admission and room assignment of 3001 after patient gave consent to update his sister Mark Marrero.       Jae Sanderson RN  01/07/22 6724

## 2022-01-07 NOTE — ED TRIAGE NOTES
Pt presents to ED from home by EMS for SOB and lethargy. Pt's O2 sat was 77% on room air per EMS. Pt is on 6L NC with an O2 sat of 93%.  Pt has been covid positive almost 2 weeks

## 2022-01-07 NOTE — ED PROVIDER NOTES
Triage Chief Complaint:   Shortness of Breath (on 6L NC) and Positive For Covid-19    [place]:  Esau Washington is a 64 y.o. male that presents with shortness of breath and generalized weakness. He denies any cough or fevers currently. He states he was diagnosed with COVID about 2 weeks ago and was seen yesterday and diagnosed with COVID pneumonia. Since yesterday's become significantly more short of breath and was requiring supplemental oxygen via nasal cannula. EMS found him to be 77% on room air when they arrived at his house. He denies any nausea, vomiting or chest pain. Family thought he may be slightly confused, though answering questions appropriately her in the ER. Patient was not vaccinated for COVID. ROS:   Review of Systems   Constitutional: Positive for fatigue. Negative for chills and fever. HENT: Negative for congestion, rhinorrhea and sore throat. Eyes: Negative for redness and visual disturbance. Respiratory: Positive for cough and shortness of breath. Cardiovascular: Negative for chest pain and leg swelling. Gastrointestinal: Negative for abdominal pain, diarrhea, nausea and vomiting. Genitourinary: Negative for dysuria and frequency. Musculoskeletal: Negative for arthralgias and back pain. Skin: Negative for rash and wound. Neurological: Positive for weakness (generalized). Negative for syncope, light-headedness, numbness and headaches. Psychiatric/Behavioral: Negative for hallucinations and suicidal ideas. Past Medical History:   Diagnosis Date    HLD 10/21/2021    Moderate episode of recurrent major depressive disorder ([facility] [address]     Seizures (HCC)      Past Surgical History:   Procedure Laterality Date    BRAIN SURGERY       History reviewed. No pertinent family history.   Social History     Socioeconomic History    Marital status:      Spouse name: Not on file    Number of children: Not on file    Years of education: Not on file    Highest education level: Not on file   Occupational History    Not on file   Tobacco Use    Smoking status: Never Smoker    Smokeless tobacco: Never Used   Substance and Sexual Activity    Alcohol use: No    Drug use: No    Sexual activity: Not on file   Other Topics Concern    Not on file   Social History Narrative    Not on file     Social Determinants of Health     Financial Resource Strain: Low Risk     Difficulty of Paying Living Expenses: Not hard at all   Food Insecurity: No Food Insecurity    Worried About Running Out of Food in the Last Year: Never true    Carlos of Food in the Last Year: Never true   Transportation Needs: No Transportation Needs    Lack of Transportation (Medical): No    Lack of Transportation (Non-Medical):  No   Physical Activity:     Days of Exercise per Week: Not on file    Minutes of Exercise per Session: Not on file   Stress:     Feeling of Stress : Not on file   Social Connections:     Frequency of Communication with Friends and Family: Not on file    Frequency of Social Gatherings with Friends and Family: Not on file    Attends Yarsani Services: Not on file    Active Member of [address] [place] [address] or Organizations: Not on file    Attends Club or Organization Meetings: Not on file    Marital Status: Not on file   Intimate Partner Violence:     Fear of Current or Ex-Partner: Not on file    Emotionally Abused: Not on file    Physically Abused: Not on file    Sexually Abused: Not on file   Housing Stability:     Unable to Pay for Housing in the Last Year: Not on file    Number of [place] in the Last Year: Not on file    Unstable Housing in the Last Year: Not on file     Current Facility-Administered Medications   Medication Dose Route Frequency Provider Last Rate Last Admin    dexamethasone (PF) (DECADRON) injection 10 mg  10 mg IntraVENous Once Will Kemp MD         Current Outpatient Medications   Medication Sig Dispense Refill    ascorbic acid (VITAMIN C) 500 MG tablet Take 1 tablet by mouth 2 times daily for 14 days 28 tablet 0    guaiFENesin (MUCINEX) 600 MG extended release tablet Take 1 tablet by mouth 2 times daily for 10 days 20 tablet 0    vitamin D3 (CHOLECALCIFEROL) 25 MCG (1000 UT) TABS tablet Take 1 tablet by mouth daily 30 tablet 0    melatonin 3 MG TABS tablet Take 1 tablet by mouth nightly for 14 days 14 tablet 0    albuterol sulfate HFA (VENTOLIN HFA) 108 (90 Base) MCG/ACT inhaler Inhale 2 puffs into the lungs 4 times daily as needed for Wheezing 18 g 0    cloBAZam 20 MG TABS Take 1 tablet by mouth 2 times daily.  sertraline (ZOLOFT) 25 MG tablet Take 1 tablet by mouth daily 90 tablet 0    atorvastatin (LIPITOR) 80 MG tablet Take 1 tablet by mouth daily 90 tablet 2    bisacodyl (BISACODYL) 5 MG EC tablet Take 4 tablets once for colonoscopy prep 4 tablet 0    clonazePAM (KLONOPIN) 0.5 MG disintegrating tablet Take 1 tablet by mouth 2 times daily as needed.  lamoTRIgine (LAMICTAL) 150 MG tablet Take 150 mg by mouth 2 times daily.  topiramate (TOPAMAX) 200 MG tablet Take 200 mg by mouth 2 times daily. Takes 200 mg in am and 300 mg in pm.       No Known Allergies    Nursing Notes Reviewed     Physical Exam:   ED Triage Vitals   Enc Vitals Group      BP       Pulse       Resp       Temp       Temp src       SpO2       Weight       Height       Head Circumference       Peak Flow       Pain Score       Pain Loc       Pain Edu? Excl. in [address]? /80   Pulse 78   Temp 100 °F (37.8 °C)   Resp (!) 34   Ht 6' (1.829 m)   Wt 185 lb (83.9 kg)   SpO2 94%   BMI 25.09 kg/m²   My pulse ox interpretation is - abnormal  Physical Exam  Constitutional:       General: He is not in acute distress. Appearance: He is ill-appearing. He is not diaphoretic. Comments: Appears to feel unwell     HENT:      Head: Normocephalic and atraumatic. Eyes:      General:         Right eye: No discharge. Left eye: No discharge. Conjunctiva/sclera: Conjunctivae normal.   Cardiovascular:      Rate and Rhythm: Normal rate and regular rhythm. Pulses: Normal pulses. Radial pulses are 2+ on the right side and 2+ on the left side. Pulmonary:      Effort: Pulmonary effort is normal. No respiratory distress. Breath sounds: Normal breath sounds. No wheezing or rales. Comments: Requiring supplemental oxygen via NC, currently on 4L  Abdominal:      General: There is no distension. Tenderness: There is no abdominal tenderness. There is no guarding or rebound. Musculoskeletal:         General: No swelling or tenderness. Normal range of motion. Right lower leg: No edema. Left lower leg: No edema. Skin:     General: Skin is warm and dry. Neurological:      General: No focal deficit present. Mental Status: He is alert. Cranial Nerves: No cranial nerve deficit. Motor: Weakness (generalized and equal) present.    Psychiatric:         Mood and Affect: Mood normal.         Behavior: Behavior normal.         I have reviewed and interpreted all of the currently available lab results from this visit (if applicable):  Results for orders placed or performed during the hospital encounter of 01/07/22   Respiratory Panel, Molecular, with COVID-19 (Restricted: peds pts or suitable admitted adults)    Specimen: Nasopharyngeal   Result Value Ref Range    Adenovirus Detection by PCR NOT DETECTED NOT DETECTED    Coronavirus 229E PCR NOT DETECTED NOT DETECTED    Coronavirus HKU1 PCR NOT DETECTED NOT DETECTED    Coronavirus NL63 PCR NOT DETECTED NOT DETECTED    Coronavirus OC43 PCR NOT DETECTED NOT DETECTED    SARS-CoV-2 DETECTED BY PCR (A) NOT DETECTED    Human Metapneumovirus PCR NOT DETECTED NOT DETECTED    Rhinovirus Enterovirus PCR NOT DETECTED NOT DETECTED    Influenza A by PCR NOT DETECTED NOT DETECTED    Influenza A H1 Pandemic PCR NOT DETECTED NOT DETECTED    Influenza A H1 (2009) PCR NOT DETECTED NOT DETECTED    Influenza A H3 PCR NOT DETECTED NOT DETECTED    Influenza B by PCR NOT DETECTED NOT DETECTED    Parainfluenza 1 PCR NOT DETECTED NOT DETECTED    Parainfluenza 2 PCR NOT DETECTED NOT DETECTED    Parainfluenza 3 PCR NOT DETECTED NOT DETECTED    Parainfluenza 4 PCR NOT DETECTED NOT DETECTED    RSV PCR NOT DETECTED NOT DETECTED    Bordetella parapertussis by PCR NOT DETECTED NOT DETECTED    B Pertussis by PCR NOT DETECTED NOT DETECTED    Chlamydophila Pneumonia PCR NOT DETECTED NOT DETECTED    Mycoplasma pneumo by PCR NOT DETECTED NOT DETECTED   CBC Auto Differential   Result Value Ref Range    WBC 9.9 4.0 - 10.5 K/CU MM    RBC 4.07 (L) 4.6 - 6.2 M/CU MM    Hemoglobin 12.8 (L) 13.5 - 18.0 GM/DL    Hematocrit 40.7 (L) 42 - 52 %    .0 78 - 100 FL    MCH 31.4 (H) 27 - 31 PG    MCHC 31.4 (L) 32.0 - 36.0 %    RDW 12.3 11.7 - 14.9 %    Platelets 399 102 - 400 K/CU MM    MPV 8.8 7.5 - 11.1 FL    Differential Type AUTOMATED DIFFERENTIAL     Segs Relative 85.6 (H) 36 - 66 %    Lymphocytes % 8.3 (L) 24 - 44 %    Monocytes % 4.7 (H) 0 - 4 %    Eosinophils % 0.4 0 - 3 %    Basophils % 0.2 0 - 1 %    Segs Absolute 8.5 K/CU MM    Lymphocytes Absolute 0.8 K/CU MM    Monocytes Absolute 0.5 K/CU MM    Eosinophils Absolute 0.0 K/CU MM    Basophils Absolute 0.0 K/CU MM    Nucleated RBC % 0.0 %    Total Nucleated RBC 0.0 K/CU MM    Total Immature Neutrophil 0.08 K/CU MM    Immature Neutrophil % 0.8 (H) 0 - 0.43 %   Comprehensive Metabolic Panel w/ Reflex to MG   Result Value Ref Range    Sodium 139 135 - 145 MMOL/L    Potassium 4.0 3.5 - 5.1 MMOL/L    Chloride 112 (H) 99 - 110 mMol/L    CO2 16 (L) 21 - 32 MMOL/L    BUN 24 (H) 6 - 23 MG/DL    CREATININE 1.1 0.9 - 1.3 MG/DL    Glucose 107 (H) 70 - 99 MG/DL    Calcium 8.3 8.3 - 10.6 MG/DL    Albumin 2.8 (L) 3.4 - 5.0 GM/DL    Total Protein 6.2 (L) 6.4 - 8.2 GM/DL    Total Bilirubin 0.9 0.0 - 1.0 MG/DL     (H) 10 - 40 U/L     (H) 15 - 37 IU/L    Alkaline Phosphatase 202 (H) 40 - 129 IU/L    GFR Non-African American >60 >60 mL/min/1.73m2    GFR African American >60 >60 mL/min/1.73m2    Anion Gap 11 4 - 16   Troponin   Result Value Ref Range    Troponin T <0.010 <0.01 NG/ML   Brain Natriuretic Peptide   Result Value Ref Range    Pro-.9 <300 PG/ML   Blood Gas, Venous   Result Value Ref Range    pH, [place] 7.41 7.32 - 7.42    pCO2, [place] 30 (L) 38 - 52 mmHG    pO2, [place] 66 (H) 28 - 48 mmHG    Base Excess 5 (H) 0 - 3.3    HCO3, Venous 19.0 19 - 25 MMOL/L    O2 Sat, [place] 89.8 (H) 50 - 70 %    Comment VBG    Lactic Acid, Plasma   Result Value Ref Range    Lactate 1.2 0.4 - 2.0 mMOL/L      Radiographs (if obtained):  [] The following radiograph was interpreted by myself in the absence of a radiologist:  [x]Radiologist's Report Reviewed:  XR CHEST PORTABLE   Final Result   Worsening multifocal infiltrates seen throughout the lungs bilaterally felt   to represent COVID pneumonia given clinical history. EKG (if obtained): (All EKG's are interpreted by myself in the absence of a cardiologist)  Normal sinus rhythm with a rate of 73. [place] interval 160, QRS 74, QTc 429. No ST elevations or depressions. Normal T waves. Impression: Normal EKG. No previous EKGs for comparison. MDM:  Differential diagnoses considered include but are not limited to COVID-19, COVID-pneumonia, generalized weakness, electrolyte abnormality, fatigue due to COVID, bacterial pneumonia, sepsis. Upon arrival patient is requiring 6 L oxygen via nasal cannula. He does appear to feel unwell and slightly short of breath. Patient labs are obtained and show a decrease in his bicarb level to 16 as well as a transaminitis. He continues to be positive for COVID. EKG is not concerning for acute ischemia and troponin is negative. D-dimer is elevated likely due to COVID. Chest x-ray shows worsening multifocal infiltrates seen throughout the lungs bilaterally likely due to COVID-pneumonia. Will defer decision whether or not to obtain a CTA to hospitalist team.  Patient given a dose of Decadron for hypoxia in the setting of COVID. Will admit patient to the hospital for further evaluation and treatment. I spoke with Dr. Shila Worthy, who graciously agreed to admit the patient. Plan of care explained to patient. All questions and concerns were addressed to the patient's satisfaction. Patient understood and agreed with plan. I did don appropriate PPE (including face mask, protective eye ware/safety glasses and gloves), as recommended by the health facility/national standard best practice, during my bedside interactions with the patient. Clinical Impression:  1. Pneumonia due to COVID-19 virus    2. Hypoxia          Gladys Agosto MD       Please note that portions of this note may have been complete with a voice recognition program.  Effortswere made to edit the dictations, but occasional words are mis-transcribed.           Gladys Agosto MD  01/09/22 0944

## 2022-01-07 NOTE — ED NOTES
Urinal provided and informed pt a urine specimen is needed when able to provide.  Pt voiced understanding     Destiny Lim RN  01/07/22 6532

## 2022-01-07 NOTE — TELEPHONE ENCOUNTER
Received call from [place] at [facility], caller not on line.      Complaint: Fatigue    Market: [address] [facility] [place] Name:     Caller's telephone number verified as [phone]    Connected with caller via phone, please see below triage    Reason for Disposition   Patient already left for the hospital/clinic    Protocols used: NO CONTACT OR DUPLICATE CONTACT CALL-ADULT-[place]

## 2022-01-07 NOTE — H&P
History and Physical      Name:  Titi Simons /Age/Sex: 1965  (64 y.o. male)   MRN & CSN:  [id number] & [id number] Admission Date/Time: 2022  9:37 AM   Location:  ED18/ED-18 PCP: Marisol Purcell MD       Hospital Day: 1    Assessment and Plan:   Titi Simons is a 64 y.o.  male  who presents with <principal problem not specified>    1. COVID-19 pneumonia: Place patient in contact and droplet isolation. IV remdesivir per pharmacy dosing. 2. Acute respiratory failure with hypoxia-oxygen to keep saturations greater 90%. Bronchodilator treatments. Incentive spirometer  3. Seizure disorder-continue Lamictal  4. Depression-resume Zoloft  5. Abnormal liver function tests: Elevation appears acute. Patient denies alcohol use. Check Tylenol levels. Ultrasound of the liver. Elevation may be also be due to Covid hepatitis, which usually resolves over time the course of illness. 6. elevated D-dimer: CTA is negative for PE    Diet No diet orders on file   DVT Prophylaxis [x] Lovenox, []  Heparin, [] SCDs, [] Ambulation   GI Prophylaxis [x] PPI,  [] H2 Blocker,  [] Carafate,  [] Diet/Tube Feeds   Code Status No Order   Disposition Patient requires continued admission due to care requiring hospitalIZATION   MDM [] Low, [] Moderate,[x]  High  Patient's risk as above due to critical illness     History of Present Illness:     Chief Complaint: Shortness of breath  Titi Simons is a 64 y.o.  male  who has a history of seizure disorder, depression, who presents to the emergency room with progressive worsening shortness of breath. Patient was diagnosed with Covid about 2 weeks ago. Is unvaccinated. He states that the past few days, his symptoms have worsened. He has been to the emergency room twice before. Today, patient was hypoxic on arrival, with an O2 sats of 77%. He is requiring 6 L of oxygen. He denies any fever. He has had poor oral intake. Some nausea but no vomiting.   He denies any cough. Ten point ROS reviewed negative, unless as noted above    Objective:   No intake or output data in the 24 hours ending 01/07/22 1212   Vitals:   Vitals:    01/07/22 1132   BP: 112/78   Pulse: 74   Resp: 29   Temp:    SpO2: 96%     Physical Exam:   GEN Awake male, sitting upright in bed in no apparent distress. Appears given age. EYES Pupils are equally round. No scleral erythema, discharge, or conjunctivitis. HENT Mucous membranes are moist. Oral pharynx without exudates, no evidence of thrush. NECK Supple, no apparent thyromegaly or masses. RESP diminished air entry bilaterally. CARDIO/VASC S1/S2 auscultated. Regular rate without appreciable murmurs, rubs, or gallops. No JVD or carotid bruits. Peripheral pulses equal bilaterally and palpable. No peripheral edema. GI Abdomen is soft without significant tenderness, masses, or guarding. Bowel sounds are normoactive. Rectal exam deferred.  No costovertebral angle tenderness. Normal appearing external genitalia. Mcintyre catheter is not present. HEME/LYMPH No palpable cervical lymphadenopathy and no hepatosplenomegaly. No petechiae or ecchymoses. MSK No gross joint deformities. SKIN Normal coloration, warm, dry. NEURO Cranial nerves appear grossly intact, normal speech, no lateralizing weakness. PSYCH Awake, alert, oriented x 4. Affect appropriate. Past Medical History:      Past Medical History:   Diagnosis Date    HLD 10/21/2021    Moderate episode of recurrent major depressive disorder ([address]     Seizures (HCC)      PSHX:  has a past surgical history that includes brain surgery. Allergies: No Known Allergies    FAM HX: family history is not on file.   Soc HX:   Social History     Socioeconomic History    Marital status:      Spouse name: None    Number of children: None    Years of education: None    Highest education level: None   Occupational History    None   Tobacco Use    Smoking status: Never Smoker    Smokeless tobacco: Never Used   Substance and Sexual Activity    Alcohol use: No    Drug use: No    Sexual activity: None   Other Topics Concern    None   Social History Narrative    None     Social Determinants of Health     Financial Resource Strain: Low Risk     Difficulty of Paying Living Expenses: Not hard at all   Food Insecurity: No Food Insecurity    Worried About Running Out of Food in the Last Year: Never true    Carlos of Food in the Last Year: Never true   Transportation Needs: No Transportation Needs    Lack of Transportation (Medical): No    Lack of Transportation (Non-Medical):  No   Physical Activity:     Days of Exercise per Week: Not on file    Minutes of Exercise per Session: Not on file   Stress:     Feeling of Stress : Not on file   Social Connections:     Frequency of Communication with Friends and Family: Not on file    Frequency of Social Gatherings with Friends and Family: Not on file    Attends Jew Services: Not on file    Active Member of [address] [place] [address] or Organizations: Not on file    Attends Club or Organization Meetings: Not on file    Marital Status: Not on file   Intimate Partner Violence:     Fear of Current or Ex-Partner: Not on file    Emotionally Abused: Not on file    Physically Abused: Not on file    Sexually Abused: Not on file   Housing Stability:     Unable to Pay for Housing in the Last Year: Not on file    Number of [place] in the Last Year: Not on file    Unstable Housing in the Last Year: Not on file       Medications:   Medications:    dexamethasone  10 mg IntraVENous Once      Infusions:   PRN Meds:        Electronically signed by Robson Bergman MD on 1/7/2022 at 12:12 PM

## 2022-01-07 NOTE — ED NOTES
Report called to [facility] on [address]. Pt will be transported to room 3001 on 4 Lpm O2 via NC via transport tech. No distress noted. No changes noted to previous patient assessment.       Jessica Guerra, BLESSING  01/07/22 1435

## 2022-01-07 NOTE — CONSULTS
Pharmacy Consult for Baricitinib Initiation per Dr. Vargas Vasques per [facility]  **All criteria need to be met to receive   Baricitinib for COVID-19 patients**   Age    >5years old     Yes   Laboratory Results    Confirmed positive COVID-19     PLUS    ANY elevation in biomarkers   (CRP, D-dimer, LDH, ferritin)       Yes     Concomitant Therapy    Receiving systemic steroids for treatment of COVID 19     Yes   Oxygen Status        Requiring supplemental oxygen   (>1 L NC, HFNC, Heated Vapotherm, biPAP, mechanical ventilation)        Yes   Special Considerations    Pregnancy  Severe Hepatic Impairment  Chronic/Recurrent Infections   Hemoglobin <8         Clarify risk vs. benefit with provider if criteria met  -LFT'S ELEVATED, MD WOULD LIKE TO START BARICITINIB AND FOLLOW LFT TRENDS CLOSELY     Contraindications    1. Invasive active mycobacterial or fungal infection  2. Lymphocyte count <200  3. Neutrophil count, ANC <500   4. Significant immunosuppression    ? None     Dosing Recommendations:    Baricitinib 4 mg PO daily x 14 days (or until hospital discharge)    Renal dose adjustment:  -eGFR > 60: no adjustment necessary       Thank you for the consult,  Kenny Wise.  [facility]  1/7/2022 @ 3:17 PM

## 2022-01-08 NOTE — PROGRESS NOTES
01/08/22 0854   Oxygen Therapy/Pulse Ox   O2 Therapy Oxygen   $Oxygen $Daily Charge   O2 Device PAP (positive airway pressure)   FiO2  50 %   Resp 26   SpO2 99 %   Pulse Oximeter Device Mode Continuous   Pulse Oximeter Device Location Finger   $Pulse Oximeter $Spot check (multiple/continuous)

## 2022-01-08 NOTE — CARE COORDINATION
Chart reviewed and consult to CM rec'd. Per nursing charting, pt is from home with family and was independent PTA. Pt is currently on 10L O2 with continued shortness of breath. Pt has PCP and insurance with RX coverage. CM will continue to follow for pt progress and possible discharge needs.

## 2022-01-09 NOTE — CONSULTS
Subjective:   CHIEF COMPLAINT / HPI:  64year old male admitted with worsening sob. He has off and on wheeze. He had some cough. he was found to be covid positive. His condition has started getting worst with decacrease in mental status and increased o2 requirement.       Past Medical History:  Past Medical History:   Diagnosis Date    HLD 10/21/2021    Moderate episode of recurrent major depressive disorder (HCC)     Seizures (HCC)        Past Surgical History:        Procedure Laterality Date    BRAIN SURGERY         Current Medications:    Current Facility-Administered Medications: LORazepam (ATIVAN) injection 1 mg, 1 mg, IntraVENous, Q4H PRN  dexamethasone (PF) (DECADRON) injection 6 mg, 6 mg, IntraVENous, Daily  albuterol sulfate  (90 Base) MCG/ACT inhaler 2 puff, 2 puff, Inhalation, Q4H WA  ipratropium (ATROVENT HFA) 17 MCG/ACT inhaler 2 puff, 2 puff, Inhalation, 4x daily  lamoTRIgine (LAMICTAL) tablet 150 mg, 150 mg, Oral, BID  topiramate (TOPAMAX) tablet 200 mg, 200 mg, Oral, BID  clonazePAM (KLONOPIN) tablet 0.5 mg, 0.5 mg, Oral, BID PRN  atorvastatin (LIPITOR) tablet 80 mg, 80 mg, Oral, Nightly  bisacodyl (DULCOLAX) EC tablet 10 mg, 10 mg, Oral, Daily PRN  cloBAZam TABS 20 mg PATIENT SUPPLIED, 20 mg, Oral, BID  sertraline (ZOLOFT) tablet 25 mg, 25 mg, Oral, Daily  ascorbic acid (VITAMIN C) tablet 500 mg, 500 mg, Oral, BID  guaiFENesin (MUCINEX) extended release tablet 600 mg, 600 mg, Oral, BID  melatonin tablet 3 mg, 3 mg, Oral, Nightly  sodium chloride flush 0.9 % injection 5-40 mL, 5-40 mL, IntraVENous, 2 times per day  sodium chloride flush 0.9 % injection 5-40 mL, 5-40 mL, IntraVENous, PRN  0.9 % sodium chloride infusion, 25 mL, IntraVENous, PRN  enoxaparin (LOVENOX) injection 30 mg, 30 mg, SubCUTAneous, BID  ondansetron (ZOFRAN-ODT) disintegrating tablet 4 mg, 4 mg, Oral, Q8H PRN **OR** ondansetron (ZOFRAN) injection 4 mg, 4 mg, IntraVENous, Q6H PRN  polyethylene glycol (GLYCOLAX) packet 17 g, 17 g, Oral, Daily PRN  acetaminophen (TYLENOL) tablet 650 mg, 650 mg, Oral, Q6H PRN **OR** acetaminophen (TYLENOL) suppository 650 mg, 650 mg, Rectal, Q6H PRN  Vitamin D (CHOLECALCIFEROL) tablet 6,000 Units, 6,000 Units, Oral, Daily **FOLLOWED BY** [START ON 1/14/2022] Vitamin D (CHOLECALCIFEROL) tablet 2,000 Units, 2,000 Units, Oral, Daily  influenza quadrivalent split vaccine (FLUZONE;FLUARIX;FLULAVAL;AFLURIA) injection 0.5 mL, 0.5 mL, IntraMUSCular, Prior to discharge  baricitinib (OLUMIANT) tablet 4 mg, 4 mg, Oral, Daily    No Known Allergies    Social History:    Social History     Socioeconomic History    Marital status:      Spouse name: None    Number of children: None    Years of education: None    Highest education level: None   Occupational History    None   Tobacco Use    Smoking status: Never Smoker    Smokeless tobacco: Never Used   Substance and Sexual Activity    Alcohol use: No    Drug use: No    Sexual activity: None   Other Topics Concern    None   Social History Narrative    None     Social Determinants of Health     Financial Resource Strain: Low Risk     Difficulty of Paying Living Expenses: Not hard at all   Food Insecurity: No Food Insecurity    Worried About Running Out of Food in the Last Year: Never true    Carlos of Food in the Last Year: Never true   Transportation Needs: No Transportation Needs    Lack of Transportation (Medical): No    Lack of Transportation (Non-Medical):  No   Physical Activity:     Days of Exercise per Week: Not on file    Minutes of Exercise per Session: Not on file   Stress:     Feeling of Stress : Not on file   Social Connections:     Frequency of Communication with Friends and Family: Not on file    Frequency of Social Gatherings with Friends and Family: Not on file    Attends Mormonism Services: Not on file    Active Member of Clubs or Organizations: Not on file    Attends Club or Organization Meetings: Not on file   Jennifer Villalobos Marital Status: Not on file   Intimate Partner Violence:     Fear of Current or Ex-Partner: Not on file    Emotionally Abused: Not on file    Physically Abused: Not on file    Sexually Abused: Not on file   Housing Stability:     Unable to Pay for Housing in the Last Year: Not on file    Number of [place] in the Last Year: Not on file    Unstable Housing in the Last Year: Not on file       Family History:   History reviewed. No pertinent family history. Immunization:  Immunization History   Administered Date(s) Administered    Influenza Virus Vaccine 11/07/2011, 10/03/2018    Influenza, MDCK Quadv, IM, PF (Flucelvax 2 yrs and older) 10/24/2019    Tdap (Boostrix, Adacel) 09/08/2017    Tetanus 01/01/2015         REVIEW OF SYSTEMS:    CONSTITUTIONAL:  negative for fevers, chills, diaphoresis, activity change, appetite change, fatigue, night sweats and unexpected weight change.    EYES:  negative for blurred vision, eye discharge, visual disturbance and icterus  HEENT:  negative for hearing loss, tinnitus, ear drainage, sinus pressure, nasal congestion, epistaxis and snoring  RESPIRATORY:  See HPI  CARDIOVASCULAR:  negative for chest pain, palpitations, exertional chest pressure/discomfort, edema, syncope  GASTROINTESTINAL:  negative for nausea, vomiting, diarrhea, constipation, blood in stool and abdominal pain  GENITOURINARY:  negative for frequency, dysuria and hematuria  HEMATOLOGIC/LYMPHATIC:  negative for easy bruising, bleeding and lymphadenopathy  ALLERGIC/IMMUNOLOGIC:  negative for recurrent infections, angioedema, anaphylaxis and drug reactions  ENDOCRINE:  negative for weight changes and diabetic symptoms including polyuria, polydipsia and polyphagia    MUSCULOSKELETAL:  negative for  pain, joint swelling, decreased range of motion and muscle weakness  NEUROLOGICAL:  negative for headaches, slurred speech, unilateral weakness  PSYCHIATRIC/BEHAVIORAL: negative for hallucinations, behavioral problems, confusion and agitation. Objective:   PHYSICAL EXAM:      VITALS:    Vitals:    01/09/22 0734 01/09/22 0737 01/09/22 0859 01/09/22 1112   BP:   (!) 151/89    Pulse:   89    Resp: 30 30 22 (!) 41   Temp:   99.9 °F (37.7 °C)    TempSrc:   Axillary    SpO2: 95%  90% 91%   Weight:       Height:             CONSTITUTIONAL:  awake, alert, cooperative, no apparent distress, and appears stated age  NECK:  Supple, symmetrical, trachea midline, no adenopathy, thyroid symmetric, not enlarged and no tenderness  CHEST: Chest expansion equal and symmetrical, no intercostal retraction. LUNGS:  no increased work of breathing, has expiratory wheezes both lungs, no crackles. CARDIOVASCULAR: S1 and S2, no edema and no JVD  ABDOMEN:  normal bowel sounds, non-distended and no masses palpated, and no tenderness to palpation. No hepatospleenomegaly  LYMPHADENOPATHY:  no axillary or supraclavicular adenopathy. No cervical adnenopathy  PSYCHIATRIC: Oriented to person place and time. No obvious depression or anxiety. MUSCULOSKELETAL: No obvious misalignment or effusion of the joints. No clubbing, cyanosis of the digits. RIGHT AND LEFT LOWER EXTREMITIES: No edema, no inflammation, no tenderness. SKIN:  normal skin color, texture, turgor and no redness, warmth, or swelling. No palpable nodules    DATA:       EXAMINATION:   CTA OF THE CHEST 1/6/2022 6:02 am       TECHNIQUE:   CTA of the chest was performed after the administration of intravenous   contrast.  Multiplanar reformatted images are provided for review.  MIP   images are provided for review.  Dose modulation, iterative reconstruction,   and/or weight based adjustment of the mA/kV was utilized to reduce the   radiation dose to as low as reasonably achievable.       COMPARISON:   None.       HISTORY:   ORDERING SYSTEM PROVIDED HISTORY: reports exertional shorntess of breath,   known covid +, concern for PE or other intrathoracic process   TECHNOLOGIST PROVIDED HISTORY: Reason for exam:->reports exertional shorntess of breath, known covid +,   concern for PE or other intrathoracic process   Decision Support Exception - unselect if not a suspected or confirmed   emergency medical condition->Emergency Medical Condition (MA)   Reason for Exam: reports exertional shorntess of breath, known covid +,   concern for PE or other intrathoracic process       FINDINGS:   Pulmonary Arteries: Pulmonary arteries are adequately opacified for   evaluation.  No evidence of intraluminal filling defect to suggest pulmonary   embolism.  Main pulmonary artery is normal in caliber.       Mediastinum: No evidence of mediastinal lymphadenopathy.  The heart and   pericardium demonstrate no acute abnormality.  There is no acute abnormality   of the thoracic aorta.       Lungs/pleura: The lung parenchyma demonstrates extensive infiltrates   involving the lungs bilaterally with lower lobe predominance.  No pleural   effusions or pneumothoraces are seen.       Upper Abdomen: Limited images of the upper abdomen are unremarkable.       Soft Tissues/Bones: No acute bone or soft tissue abnormality.           Impression   1. No CT evidence for acute pulmonary embolus. 2. Extensive COVID pneumonia.  Follow-up to resolution is recommended.       RECOMMENDATIONS:   Unavailable             Order History    Open Order Details     PACS Images     Show images for CTA PULMONARY W CONTRAST              abg 7.41/30/66          Assessment:     1. Ac hypoxemic resp failure  2. covid pneumonia          Plan:     1. Adequate o2 adm  2. Cont pap rx  3. Bd rx as needed  4. On decadron  5. Cont baricitinab  6. Close observation as may need vent support  7.  Thanks will follow

## 2022-01-09 NOTE — PROGRESS NOTES
01/09/22 1112   Oxygen Therapy/Pulse Ox   O2 Therapy Oxygen   O2 Device PAP (positive airway pressure)   FiO2  70 %   Resp (!) 41   SpO2 91 %   Pulse Oximeter Device Mode Continuous   Pulse Oximeter Device Location Finger

## 2022-01-09 NOTE — PROGRESS NOTES
Pt having change in condition pt not staying awake to take meds, respiratory increased bipap setting, this nurse notified Dr. Mike Melara no new orders at this time.

## 2022-01-09 NOTE — PROGRESS NOTES
Hospitalist Progress Note      Name:  Gerardo Pacheco /Age/Sex: 1965  (64 y.o. male)   MRN & CSN:  [id number] & [id number] Admission Date/Time: 2022  9:37 AM   Location:  3001/3001-B PCP: Anjum Vargas MD         Hospital Day: 2    Assessment and Plan:   Gerardo Pacheco is a 64 y.o.  male  who presents with     Acute hypoxemic respiratory failure secondary to COVID-19 pneumonia  -CT PE shows no evidence of pulmonary embolus. There is extensive Covid pneumonia. -Chest x-ray 2022-worsening multifocal infiltrates throughout the lungs bilaterally  -Inflammatory markers are elevated. Continue baricitinib, dexamethasone.  -Started on BiPAP today for worsening respiratory status.  -Currently his oxygen saturation is 93% on EPAP 6, IPAP 12, 50% FiO2. Covid related transaminitis  -Will monitor hepatic function    Seizure disorder  -Continue Lamictal    Depression  -Continue Zoloft    Continue home meds for all chronic medical conditions. Diet ADULT DIET; Regular; 4 carb choices (60 gm/meal)   DVT Prophylaxis [] Lovenox, []  Heparin, [] SCDs, [] Ambulation   GI Prophylaxis [] PPI,  [] H2 Blocker,  [] Carafate,  [] Diet/Tube Feeds   Code Status Full Code   Disposition Patient requires continued admission due to acute hypoxemic respiratory failure secondary to COVID-19 pneumonia   MDM      History of Present Illness:     Chief Complaint: <principal problem not specified>  Gerardo Pacheco is a 64 y.o.  male  who presents with acute hypoxemic respiratory failure secondary to COVID-19 pneumonia. Says he is not doing too well today. Breathing better with BiPAP. Ten point ROS reviewed negative, unless as noted above    Objective:        Intake/Output Summary (Last 24 hours) at 2022 2213  Last data filed at 2022 0650  Gross per 24 hour   Intake 60 ml   Output 425 ml   Net -365 ml      Vitals:   Vitals:    22 1919   BP:    Pulse:    Resp: 29   Temp:    SpO2:      Physical Exam:   GEN Awake male, laying on his left side in bed in no apparent distress. Appears given age. HEENT NC/AT, mucous membranes dry, pink  RESP decreased breath sounds, no wheeze, rales or rhonchi  CARDIO/VASC S1/S2 auscultated. Regular rate without appreciable murmurs, rubs, or gallops. No JVD or carotid bruits. Peripheral pulses equal bilaterally and palpable. No peripheral edema. GI Abdomen is soft without significant tenderness, masses, or guarding. Bowel sounds are normoactive. MSK No gross joint deformities. SKIN Normal coloration, warm, dry. NEURO Cranial nerves appear grossly intact, normal speech, no lateralizing weakness. PSYCH Awake, drowsy, oriented x 4. Affect appropriate. Medications:   Medications:    dexamethasone  6 mg IntraVENous Daily    albuterol sulfate HFA  2 puff Inhalation Q4H WA    ipratropium  2 puff Inhalation 4x daily    lamoTRIgine  150 mg Oral BID    topiramate  200 mg Oral BID    atorvastatin  80 mg Oral Nightly    cloBAZam  20 mg Oral BID    sertraline  25 mg Oral Daily    ascorbic acid  500 mg Oral BID    guaiFENesin  600 mg Oral BID    melatonin  3 mg Oral Nightly    sodium chloride flush  5-40 mL IntraVENous 2 times per day    enoxaparin  30 mg SubCUTAneous BID    Vitamin D  6,000 Units Oral Daily    Followed by   Rigo Gooden ON 1/14/2022] Vitamin D  2,000 Units Oral Daily    influenza virus vaccine  0.5 mL IntraMUSCular Prior to discharge    baricitinib  4 mg Oral Daily      Infusions:    sodium chloride       PRN Meds: clonazePAM, 0.5 mg, BID PRN  bisacodyl, 10 mg, Daily PRN  sodium chloride flush, 5-40 mL, PRN  sodium chloride, 25 mL, PRN  ondansetron, 4 mg, Q8H PRN   Or  ondansetron, 4 mg, Q6H PRN  polyethylene glycol, 17 g, Daily PRN  acetaminophen, 650 mg, Q6H PRN   Or  acetaminophen, 650 mg, Q6H PRN      Patient is still admitted because acute hypoxemic respiratory failure secondary to COVID-19 pneumonia.  The anticipated discharge is in greater than 24 hours.      Electronically signed by Zach Carter MD on 1/8/2022 at 10:13 PM

## 2022-01-10 NOTE — PROGRESS NOTES
Rapid response called. Patients o2 sats in the 60's on a 100% bipap, Resp rates in the 40's using accessory muscles. heart rate in the 140's. Patient very lethargic at this time. Orders given to transfer to [facility] ICU ROOM 2001.

## 2022-01-10 NOTE — PROGRESS NOTES
pulmonary      SUBJECTIVE: intubated on vent     OBJECTIVE    VITALS:  /85   Pulse 74   Temp 98.4 °F (36.9 °C)   Resp 20   Ht 6' (1.829 m)   Wt 185 lb (83.9 kg)   SpO2 97%   BMI 25.09 kg/m²   HEAD AND FACE EXAM:  No throat injection, no active exudate,no thrush  NECK EXAM;No JVD, no masses, symmetrical  CHEST EXAM; Expansion equal and symmetrical, no masses  LUNG EXAM; Good breath sounds bilaterally.  There are expiratory wheezes both lungs, there are crackles at both lung bases  CARDIOVASCULAR EXAM: Positive S1 and S2, no S3 or S4, no clicks ,no murmurs  RIGHT AND LEFT LOWER EXTRIMITY EXAM: No edema, no swelling, no inflamation            LABS   Lab Results   Component Value Date    WBC 14.9 (H) 01/10/2022    HGB 12.4 (L) 01/10/2022    HCT 38.6 (L) 01/10/2022    MCV 98.5 01/10/2022     01/10/2022     Lab Results   Component Value Date    CREATININE 1.3 01/10/2022    BUN 41 (H) 01/10/2022     (H) 01/10/2022    K 4.1 01/10/2022     (H) 01/10/2022    CO2 20 (L) 01/10/2022     No results found for: INR, PROTIME     No results found for: PHOS     Recent Labs     01/10/22  0730   PH 7.39   PO2ART 63*   YYV7NLV 33.0   O2SAT 92.1*         Wt Readings from Last 3 Encounters:   01/07/22 185 lb (83.9 kg)   01/06/22 185 lb (83.9 kg)   12/16/21 186 lb 9.6 oz (84.6 kg)          EXAMINATION:   ONE XRAY VIEW OF THE CHEST       1/10/2022 12:56 am       COMPARISON:   01/07/2021       HISTORY:   ORDERING SYSTEM PROVIDED HISTORY: hypoxia, ETT placement   TECHNOLOGIST PROVIDED HISTORY:   Reason for exam:->hypoxia, ETT placement   Reason for Exam: hypoxia, ETT placement       FINDINGS:   There is an ET tube in place with the tip approximately 5 cm above the   nicol.  There is a gastric tube in place which is looped in the fundus of   the stomach.  The heart is normal.  The pulmonary vessels are engorged and   ill-defined centrally and less prominent.  There are some hazy perihilar and   bibasilar opacities which is slightly less prominent.  Effusion or   pneumothorax is seen.           Impression   Status post ET tube and gastric tube placement in good position.       Slight decrease in the heart size with slowly resolving central pulmonary   congestion       Hazy perihilar and bibasilar interstitial and ground-glass opacities which   may represent pulmonary edema and/or pneumonia and may be slightly less   prominent.             Order History    Open Order Details             ASSESMENT  Ac resp failure  covid pneumonia        PLAN  1. On decadron  2. Cont baricitinab  3. Cont full vent support  4.  Tube feed    1/10/2022  Zuri Call MD, M.D.

## 2022-01-10 NOTE — ANESTHESIA PROCEDURE NOTES
Airway  Date/Time: 1/10/2022 12:14 AM  Urgency: emergent    Airway not difficult    General Information and Staff    Patient location during procedure: ICU  Anesthesiologist: Gus Malone MD  Resident/CRNA: Kerstin Meigs, APRN - MURIEL    Consent for Airway (if performed for an anesthetic, see related documentation for consents)  Patient identity confirmed: per hospital policy  Consent: The procedure was performed in an emergent situation. Verbal consent not obtained. Written consent not obtained.   Risks and benefits: risks, benefits and alternatives were not discussed      Code status verified:yes  Indications and Patient Condition  Indications for airway management: respiratory distress  Spontaneous ventilation: present  Sedation level: deep  Preoxygenated: yes  Patient position: sniffing  MILS not maintained throughout  Mask difficulty assessment: vent by bag mask    Final Airway Details  Final airway type: endotracheal airway      Successful airway: ETT  Cuffed: yes   Successful intubation technique: video laryngoscopy  Endotracheal tube insertion site: oral  Blade size: #3  ETT size (mm): 8.0  Cormack-Lehane Classification: grade I - full view of glottis  Placement verified by: chest auscultation and capnometry   Inital cuff pressure (cm H2O): 5  Measured from: lips  ETT to lips (cm): 23  Number of attempts at approach: 2  Ventilation between attempts: bag mask  Number of other approaches attempted: 0

## 2022-01-10 NOTE — SIGNIFICANT EVENT
Significant event documentation:   Patient assessed at bedside due to rapid response being called for hypoxia SPO2 reportedly in the 50s by nurse and increased respiratory distress. Patient currently maxed on BiPAP 100% SPO2 87%. Patient with increased work of breathing and tachypnea. We will proceed with intubation and mechanical ventilation. Patient answers head yes when asked regarding intubation. CRNA called to assist with intubation      Reason for call/time of call:   Hypoxia/increased respiratory distress  Physical Exam:   Vitals:    01/09/22 2316   BP:    Pulse: 108   Resp: (!) 42   Temp:    SpO2: 87      Mental status: Alert, unable to speak due to respiratory distress nods head yes regarding intubation, no lateralizing weakness  CV:regular tachycardic rhythm  Lungs: Bilaterally, no audible wheezing or rhonchi.      Interventions/orders placed:  Intubation with mechanical ventilation  Vent bundle  Propofol/fentanyl  CXR post intubation, if no large pneumothorax will do CTA pulmonary rule out PE  Pulmonary consult for vent management      PATRICE Bowman - NP  Hospitalist

## 2022-01-10 NOTE — CONSULTS
impaired respiratory function as evidenced by NPO or clear liquid status due to medical condition,intubation (mechanical vent)    Nutrition Interventions:   Food and/or Nutrient Delivery:  Start Tube Feeding  Nutrition Education/Counseling:  Education not indicated   Coordination of Nutrition Care:  Continue to monitor while inpatient,Coordination of Community Care    Goals:  Pt will tolerate new EN initiation within 24-48 hours       Nutrition Monitoring and Evaluation:   Behavioral-Environmental Outcomes:  None Identified   Food/Nutrient Intake Outcomes:  Enteral Nutrition Intake/Tolerance  Physical Signs/Symptoms Outcomes:  Biochemical Data,GI Status,Hemodynamic Status,Weight     Discharge Planning:     Too soon to determine     Electronically signed by Navid Harding RD, LD on 1/10/22 at 9:49 AM EST    Contact: [phone]

## 2022-01-10 NOTE — CARE COORDINATION
Chart reviewed. Patient now intubated. Will follow for post extubation needs. Tiffanie Kolb RN     [id number] Attempted to contact family- mother Gab Waters at [phone]; no  capacity. Will reattempt.  Tiffanie Kolb RN

## 2022-01-10 NOTE — TELEPHONE ENCOUNTER
Patient's Sister [facility] called on 1/9/2022 at 1:55 PM to discuss patient. Patient was admitted to [facility] for COVID-pneumonia and is currently on BiPAP. It is recommended that he goes on mechanical ventilation, but the family is not sure that is the best option. Sister discussed possibly transferring to another facility. She is a knees on high flow oxygen and wanted know if that was an option for the patient. Not knowing the patient or his history, I recommended that they follow the treating physicians recommendations and allow intubation and mechanical ventilation. I did not recommend trying to transfer patient due to all hospitals in the area being overwhelmed right now. Patient's sister seemed understand and agree with plan. All questions were answered.

## 2022-01-10 NOTE — PROGRESS NOTES
Hospitalist Progress Note      Name:  Xavier Myers /Age/Sex: 1965  (64 y.o. male)   MRN & CSN:  [id number] & [id number] Admission Date/Time: 2022  9:37 AM   Location:  3001/3001-B PCP: Liban Egan MD         Hospital Day: 3    Assessment and Plan:   Xavier Myers is a 64 y.o.  male  who presents with     Acute hypoxemic respiratory failure secondary to COVID-19 pneumonia  -CT PE shows no evidence of pulmonary embolus. There is extensive Covid pneumonia. -Chest x-ray 2022-worsening multifocal infiltrates throughout the lungs bilaterally  -Inflammatory markers are elevated. Continue baricitinib, dexamethasone.  -Continue on BiPAP. He is worse today. Breathing in the 40s. He is delirious. Patient's mother Myra Lou and daughter Eli Mena who lives in [place] were updated. They say that he did not want intubation but they need to discuss as a family and make a decision. Called them back in the evening and they are still hoping to avoid intubation if possible but remain undecided. Daughter is planning to come to [address] [place] [address] Dr to see her father soon.    -Currently his oxygen saturation is 92% on EPAP 6, IPAP 12, 60% FiO2. RR high 20s, low 30s after low dose ativan for anxiety.    -Family also requested transfer to [facility]. Called transfer center. Transfer was not accepted as they are not accepting patients due to capacity limitations, not accepting patients outside of zone 2 and there is no medical need at this time ie. There is no higher level of care that [facility] can provide that [facility] cannot. Patient has a relative who see a pulmonologist Dr. Thu Kessler at [facility]. Family say that Dr. Thu Kessler is willing to accept the patient under his care at [facility]. I communicated that to the transfer center/[facility] and they said that they would page Dr. Thu Kessler and get back to me. When they called me back they said that the family would need to have Dr. Thu Kessler make a call to administration at [facility].  I communicated all this to the patient's mother Luis Hughes. Covid related transaminitis  -Will monitor hepatic function    Seizure disorder  -Continue Lamictal    Depression  -Continue Zoloft    Continue home meds as tolerated for all chronic medical conditions. Diet ADULT DIET; Regular; 4 carb choices (60 gm/meal)   DVT Prophylaxis [] Lovenox, []  Heparin, [] SCDs, [] Ambulation   GI Prophylaxis [] PPI,  [] H2 Blocker,  [] Carafate,  [] Diet/Tube Feeds   Code Status Full Code   Disposition Patient requires continued admission due to acute hypoxemic respiratory failure secondary to COVID-19 pneumonia   MDM      History of Present Illness:     Chief Complaint: <principal problem not specified>  Stanton Deng is a 64 y.o.  male  who presents with acute hypoxemic respiratory failure secondary to COVID-19 pneumonia. Delirious. Unable to perform ROS    Objective:     No intake or output data in the 24 hours ending 01/09/22 2142   Vitals:   Vitals:    01/09/22 2010   BP: (!) 152/96   Pulse: 91   Resp: 26   Temp: 98.7 °F (37.1 °C)   SpO2: 92%     Physical Exam:   GEN Awake male, laying on his left side in bed in moderate distress. Appears given age. HEENT NC/AT, mucous membranes dry, pink  RESP decreased breath sounds, no wheeze, rales or rhonchi  CARDIO/VASC S1/S2 auscultated. Regular rate without appreciable murmurs, rubs, or gallops. No JVD or carotid bruits. Peripheral pulses equal bilaterally and palpable. No peripheral edema. GI Abdomen is soft without significant tenderness, masses, or guarding. Bowel sounds are normoactive. MSK No gross joint deformities. SKIN Normal coloration, warm, dry. NEURO Cranial nerves appear grossly intact, no lateralizing weakness.   PSYCH Delirious    Medications:   Medications:    dexamethasone  6 mg IntraVENous Daily    albuterol sulfate HFA  2 puff Inhalation Q4H WA    ipratropium  2 puff Inhalation 4x daily    lamoTRIgine  150 mg Oral BID    topiramate  200 mg Oral BID    atorvastatin  80 mg Oral Nightly    cloBAZam  20 mg Oral BID    sertraline  25 mg Oral Daily    ascorbic acid  500 mg Oral BID    guaiFENesin  600 mg Oral BID    melatonin  3 mg Oral Nightly    sodium chloride flush  5-40 mL IntraVENous 2 times per day    enoxaparin  30 mg SubCUTAneous BID    Vitamin D  6,000 Units Oral Daily    Followed by   Reema High ON 1/14/2022] Vitamin D  2,000 Units Oral Daily    influenza virus vaccine  0.5 mL IntraMUSCular Prior to discharge    baricitinib  4 mg Oral Daily      Infusions:    sodium chloride       PRN Meds: LORazepam, 1 mg, Q4H PRN  clonazePAM, 0.5 mg, BID PRN  bisacodyl, 10 mg, Daily PRN  sodium chloride flush, 5-40 mL, PRN  sodium chloride, 25 mL, PRN  ondansetron, 4 mg, Q8H PRN   Or  ondansetron, 4 mg, Q6H PRN  polyethylene glycol, 17 g, Daily PRN  acetaminophen, 650 mg, Q6H PRN   Or  acetaminophen, 650 mg, Q6H PRN      Patient is still admitted because acute hypoxemic respiratory failure secondary to COVID-19 pneumonia. The anticipated discharge is in greater than 24 hours.      Electronically signed by Aron Taylor MD on 1/9/2022 at 9:42 PM

## 2022-01-10 NOTE — CONSULTS
Consult completed. Procedure/rationale explained to Pt's family, including benefits vs potential risks; questions answered & consent obtained. Triple Lumen #6 Fr ArrowG+fozia Blue Advance PICC initiated to LUE Cephalic Vein using sterile, UltraSound-guided technique without difficulty/complications. Positioning verified via VPSg4 & 3CG with appropriate P-wave changes noted. Sterile dressing applied with SkinPrep, StatLock, BioPatch, SecurePortIV, and SwabCaps. Pt tolerated well & denies other c/o or needs. jose Messer RN notified.

## 2022-01-10 NOTE — PLAN OF CARE
Nutrition Problem #1: Inadequate oral intake  Intervention: Food and/or Nutrient Delivery: Start Tube Feeding  Nutritional Goals: Pt will tolerate new EN initiation within 24-48 hours

## 2022-01-10 NOTE — PROGRESS NOTES
01/10/22 1054   Vent Information   Vent Type 980   Vent Mode AC/PC   Pressure Ordered 22   Rate Set 20 bmp   FiO2  90 %   SpO2 98 %   SpO2/FiO2 ratio 108.89   Sensitivity 3   PEEP/CPAP 14   I Time/ I Time % 1.2 s   Humidification Source HME   Vent Patient Data   Peak Inspiratory Pressure 37 cmH2O   Mean Airway Pressure 22 cmH20   Rate Measured 20 br/min   Vt Exhaled 865 mL   Minute Volume 17.3 Liters   I:E Ratio 1:2   Spontaneous Breathing Trial (SBT) RT Doc   Pulse 72   Breath Sounds   Right Upper Lobe Diminished   Right Middle Lobe Diminished   Right Lower Lobe Diminished   Left Upper Lobe Diminished   Left Lower Lobe Diminished   Additional Respiratory  Assessments   Resp 20   Position Semi-Phelps's   Alarm Settings   High Pressure Alarm 45 cmH2O   Delay Alarm 20 sec(s)   Low Minute Volume Alarm 2.5 L/min   Apnea (secs) 20 secs   High Respiratory Rate 45 br/min   Low Exhaled Vt  250 mL   ETT (adult)   Placement Date/Time: 01/10/22 (c) 0014   Preoxygenation: Yes  Mask Ventilation: Ventilated by mask (1)  Technique: Video laryngoscopy  Tube Size: 8 mm  Blade Size: 3  Location: Oral  Grade View: Full view of the glottis  Insertion attempts: 2  Placeme. ..    Secured at 23 cm   Measured From [address] [place] [address],[address] By [employer] tube jain   Site Condition Dry

## 2022-01-11 NOTE — PROGRESS NOTES
pulmonary      SUBJECTIVE: intubated on vent     OBJECTIVE    VITALS:  /74   Pulse 83   Temp 98.6 °F (37 °C) (Axillary)   Resp 20   Ht 6' (1.829 m)   Wt 185 lb (83.9 kg)   SpO2 99%   BMI 25.09 kg/m²   HEAD AND FACE EXAM:  No throat injection, no active exudate,no thrush  NECK EXAM;No JVD, no masses, symmetrical  CHEST EXAM; Expansion equal and symmetrical, no masses  LUNG EXAM; Good breath sounds bilaterally. There are expiratory wheezes both lungs, there are crackles at both lung bases  CARDIOVASCULAR EXAM: Positive S1 and S2, no S3 or S4, no clicks ,no murmurs  RIGHT AND LEFT LOWER EXTRIMITY EXAM: No edema, no swelling, no inflamation            LABS   Lab Results   Component Value Date    WBC 14.9 (H) 01/10/2022    HGB 12.4 (L) 01/10/2022    HCT 38.6 (L) 01/10/2022    MCV 98.5 01/10/2022     01/10/2022     Lab Results   Component Value Date    CREATININE 1.3 01/10/2022    BUN 41 (H) 01/10/2022     (H) 01/10/2022    K 4.1 01/10/2022     (H) 01/10/2022    CO2 20 (L) 01/10/2022     No results found for: INR, PROTIME     No results found for: PHOS     Recent Labs     01/10/22  0730 01/11/22  0600   PH 7.39 7.41   PO2ART 63* 70*   FRT0YBG 33.0 32.0   O2SAT 92.1* 94.4*         Wt Readings from Last 3 Encounters:   01/07/22 185 lb (83.9 kg)   01/06/22 185 lb (83.9 kg)   12/16/21 186 lb 9.6 oz (84.6 kg)               ASSESMENT  Ac resp failure  covid pneumonuia        PLAN  1. Cont full vent support  2. Not weanable at present as remains on high vent settings  3.  Cont decadron and bariitinab    1/11/2022  Merrill Munguia MD, MMARTHA.

## 2022-01-11 NOTE — PROGRESS NOTES
Hospitalist Progress Note      Name:  Muna Dias /Age/Sex: 1965  (64 y.o. male)   MRN & CSN:  [id number] & [id number] Admission Date/Time: 2022  9:37 AM   Location:  -A PCP: Kathleen Hines MD         Hospital Day: 4    Assessment and Plan:   Muna Dias is a 64 y.o.  male  who presents with     Acute hypoxemic respiratory failure secondary to COVID-19 pneumonia  Shock requiring pressor support  -Patient intubated overnight. Now in CVICU. On 100%FiO2 and 14 PEEP. Sat 98%. -Requiring pressor support - on levophed at 6  -DDimer trending up, [address], [address] PE shows no evidence of pulmonary embolus. There is extensive Covid pneumonia. -Chest x-ray 1/10/2022 - ground glass opacities which may represent pulm edema and /or pneumonia and may be slightly less prominent. proBNP 179.9 on admission.  -Inflammatory markers are elevated. Continue baricitinib, dexamethasone.  -Procalcitonin 0.289. Will start antibiotics. Trend PCT  -Pulmonology following.   -Daughter, Zay Candelario who lives in [place] was updated by phone today. [phone]. H/o positive FOBT 2021  -currently on anticoagulation without signs of active bleed. Monitor H&H closely. Dietician consult for feeds    Covid related transaminitis  -Will monitor hepatic function    Seizure disorder  -Continue Lamictal, topamax    Depression  -Continue Zoloft    Continue home meds as tolerated for all chronic medical conditions.   Diet Diet NPO Exceptions are: Sips of Water with Meds  ADULT TUBE FEEDING; Orogastric; Peptide Based High Protein; Continuous; 10; Yes; 15; Q 4 hours; 45; 30; Q 6 hours; Protein; Administer One ProteinX via OGT   DVT Prophylaxis [] Lovenox, []  Heparin, [] SCDs, [] Ambulation   GI Prophylaxis [] PPI,  [] H2 Blocker,  [] Carafate,  [] Diet/Tube Feeds   Code Status Prior   Disposition Patient requires continued admission due to acute hypoxemic respiratory failure secondary to COVID-19 to discharge    baricitinib  4 mg Oral Daily      Infusions:    sodium chloride      norepinephrine 2 mcg/min (01/10/22 1818)    propofol 60 mcg/kg/min (01/10/22 2047)    fentaNYL 37.5 mcg/hr (01/10/22 0132)    sodium chloride       PRN Meds: sodium chloride flush, 5-40 mL, PRN  sodium chloride, 25 mL, PRN  LORazepam, 1 mg, Q4H PRN  clonazePAM, 0.5 mg, BID PRN  bisacodyl, 10 mg, Daily PRN  sodium chloride flush, 5-40 mL, PRN  sodium chloride, 25 mL, PRN  ondansetron, 4 mg, Q8H PRN   Or  ondansetron, 4 mg, Q6H PRN  polyethylene glycol, 17 g, Daily PRN  acetaminophen, 650 mg, Q6H PRN   Or  acetaminophen, 650 mg, Q6H PRN      Patient is still admitted because acute hypoxemic respiratory failure secondary to COVID-19 pneumonia. The anticipated discharge is in greater than 24 hours.      Electronically signed by Scout Watson MD on 1/10/2022 at 11:57 PM

## 2022-01-11 NOTE — PLAN OF CARE
Problem: Falls - Risk of:  Goal: Will remain free from falls  Description: Will remain free from falls  Outcome: Ongoing  Goal: Absence of physical injury  Description: Absence of physical injury  Outcome: Ongoing     Problem: Skin Integrity:  Goal: Will show no infection signs and symptoms  Description: Will show no infection signs and symptoms  Outcome: Ongoing  Goal: Absence of new skin breakdown  Description: Absence of new skin breakdown  Outcome: Ongoing     Problem: Airway Clearance - Ineffective  Goal: Achieve or maintain patent airway  Outcome: Ongoing     Problem: Gas Exchange - Impaired  Goal: Absence of hypoxia  Outcome: Ongoing  Goal: Promote optimal lung function  Outcome: Ongoing     Problem: Breathing Pattern - Ineffective  Goal: Ability to achieve and maintain a regular respiratory rate  Outcome: Ongoing     Problem:  Body Temperature -  Risk of, Imbalanced  Goal: Ability to maintain a body temperature within defined limits  Outcome: Ongoing  Goal: Will regain or maintain usual level of consciousness  Outcome: Ongoing  Goal: Complications related to the disease process, condition or treatment will be avoided or minimized  Outcome: Ongoing     Problem: Isolation Precautions - Risk of Spread of Infection  Goal: Prevent transmission of infection  Outcome: Ongoing     Problem: Nutrition Deficits  Goal: Optimize nutritional status  Outcome: Ongoing     Problem: Risk for Fluid Volume Deficit  Goal: Maintain normal heart rhythm  Outcome: Ongoing  Goal: Maintain absence of muscle cramping  Outcome: Ongoing  Goal: Maintain normal serum potassium, sodium, calcium, phosphorus, and pH  Outcome: Ongoing     Problem: Loneliness or Risk for Loneliness  Goal: Demonstrate positive use of time alone when socialization is not possible  Outcome: Ongoing     Problem: Fatigue  Goal: Verbalize increase energy and improved vitality  Outcome: Ongoing     Problem: Patient Education: Go to Patient Education Activity  Goal: Patient/Family Education  Outcome: Ongoing     Problem: Nutrition  Goal: Optimal nutrition therapy  Outcome: Ongoing

## 2022-01-11 NOTE — PROGRESS NOTES
01/11/22 0430   Vent Information   Vent Type 980   Vent Mode AC/PC   Pressure Ordered 20   Rate Set 20 bmp   FiO2  80 %   SpO2 94 %   SpO2/FiO2 ratio 117.5   Sensitivity 3   PEEP/CPAP 13   Humidification Source HME   Spontaneous Breathing Trial (SBT) RT Doc   Pulse 77   Additional Respiratory  Assessments   Resp 20   Alarm Settings   High Pressure Alarm 45 cmH2O   Delay Alarm 20 sec(s)   Low Minute Volume Alarm 2.5 L/min   Apnea (secs) 20 secs   High Respiratory Rate 45 br/min   Low Exhaled Vt  250 mL

## 2022-01-11 NOTE — PROGRESS NOTES
Hospitalist Progress Note       1/11/2022 7:52 AM  Admit Date: 1/7/2022    PCP: Liban Egan MD     Assessment and Plan:   1. Acute hypoxic respiratory failure: This is due to COVID-19 pneumonia. CTA-no PE. Patient was intubated on 1/9. Continue vent management. Pulmonary consult appreciated. 2.  Severe COVID-19 pneumonia: Continue on dexamethasone and baricitinib. Procalcitonin is elevated-on IV Rocephin and Zithromax for bacterial pneumonia. 3.  Hypotension: Wean off Levophed drip as tolerated. 4.  Hypernatremia: Due to dehydration. Awaiting lab this morning-we will give free water if necessary. 5.  Transaminitis: Due to COVID-19 pneumonia. May discontinue Lipitor. Chronic problems include history of craniotomy, seizure disorder and depression. Patient Active Problem List:     History of craniotomy     Malformation of cortical development of brain ([facility] [address]     Open wedge compression fracture of T11 vertebra with routine healing     Moderate episode of recurrent major depressive disorder (HCC)     Rhinitis, allergic     S/P placement of VNS (vagus nerve stimulation) device     Stenosis of right carotid artery     Symptomatic localization-related epilepsy (HCC)     HLD     Chronic right shoulder pain     Positive FIT (fecal immunochemical test)     Open wedge compression fracture of T12 vertebra with routine healing     Pneumonia due to COVID-19 virus      Subjective:     Chief Complaint   Patient presents with    Shortness of Breath     on 6L NC    Positive For Covid-19       F/U:  Interval History: Patient is intubated. Currently on propofol drip, fentanyl drip and Levophed drip. Objective:        Intake/Output Summary (Last 24 hours) at 1/11/2022 0752  Last data filed at 1/11/2022 0600  Gross per 24 hour   Intake 1671.11 ml   Output 600 ml   Net 1071.11 ml      Vitals:   Vitals:    01/11/22 0720   BP:    Pulse: 76   Resp:    Temp:    SpO2: 98%     Physical Exam:  General: Orally intubated. OG tube feeding. Neurologic: Sedated. Eyes: Not pale. Anicteric. Neck: No neck mass or thyromegaly  Cardiovascular: Regular. No murmur S3  Respiratory: Good air entry bilaterally. No wheezes or crepitation. Abdomen: Soft. No palpable mass. : Mcintyre catheter noted. Extremities: No pedal edema. The feet are warm.     Electronically signed by Beto Linder MD on 1/11/2022 at 7:52 AM  [facility]

## 2022-01-11 NOTE — CONSULTS
Comprehensive Nutrition Assessment    Type and Reason for Visit:  Reassess    Nutrition Recommendations/Plan:   · Continue EN as ordered    Nutrition Assessment:  Pt EN is appropriate and running at goal. Please continue EN. Malnutrition Assessment:  Malnutrition Status:  Insufficient data    Context:  Acute Illness       Estimated Daily Nutrient Needs:  Energy (kcal):  2057 ([facility] 2003b); Weight Used for Energy Requirements:  Current     Protein (g):  101-167 (1.2-2 g/kg IBW); Weight Used for Protein Requirements:  Ideal        Fluid (ml/day):  2057 or per MD; Method Used for Fluid Requirements:  1 ml/kcal      Wounds:  None       Current Nutrition Therapies:    Diet NPO Exceptions are: Sips of Water with Meds  ADULT TUBE FEEDING; Orogastric; Peptide Based High Protein; Continuous; 10; Yes; 15; Q 4 hours; 45; 30; Q 6 hours; Protein; Administer One ProteinX via OGT    Anthropometric Measures:  · Height: 6' (182.9 cm)  · Current Body Weight: 184 lb 15.5 oz (83.9 kg)   · Admission Body Weight: 184 lb 15.5 oz (83.9 kg)    · Usual Body Weight: 188 lb 6.4 oz (85.5 kg) (10/21/21)     · Ideal Body Weight: 178 lbs; % Ideal Body Weight 103.9 %   · BMI: 25.1   · BMI Categories: Normal Weight (BMI 18.5-24. 9)       Nutrition Diagnosis:   · Inadequate oral intake related to impaired respiratory function as evidenced by NPO or clear liquid status due to medical condition,intubation (mechanical vent)      Nutrition Interventions:   Food and/or Nutrient Delivery:  Start Tube Feeding  Nutrition Education/Counseling:  Education not indicated   Coordination of Nutrition Care:  Continue to monitor while inpatient,Coordination of Community Care    Goals:  Pt will tolerate new EN initiation within 24-48 hours       Nutrition Monitoring and Evaluation:   Behavioral-Environmental Outcomes:  None Identified   Food/Nutrient Intake Outcomes:  Enteral Nutrition Intake/Tolerance  Physical Signs/Symptoms Outcomes:  Biochemical Data,GI Status,Hemodynamic Status,Weight     Discharge Planning:     Too soon to determine     Electronically signed by Robert Dangelo RD, LD on 6/95/53 at 11:26 AM EST    Contact: [phone]

## 2022-01-12 NOTE — PROGRESS NOTES
pulmonary      SUBJECTIVE: intubated on vent     OBJECTIVE    VITALS:  /77   Pulse 80   Temp 99 °F (37.2 °C) (Oral)   Resp 20   Ht 6' (1.829 m)   Wt 185 lb (83.9 kg)   SpO2 94%   BMI 25.09 kg/m²   HEAD AND FACE EXAM:  No throat injection, no active exudate,no thrush  NECK EXAM;No JVD, no masses, symmetrical  CHEST EXAM; Expansion equal and symmetrical, no masses  LUNG EXAM; Good breath sounds bilaterally. There are expiratory wheezes both lungs, there are crackles at both lung bases  CARDIOVASCULAR EXAM: Positive S1 and S2, no S3 or S4, no clicks ,no murmurs  RIGHT AND LEFT LOWER EXTRIMITY EXAM: No edema, no swelling, no inflamation            LABS   Lab Results   Component Value Date    WBC 15.9 (H) 01/12/2022    HGB 11.1 (L) 01/12/2022    HCT 34.8 (L) 01/12/2022    MCV 97.8 01/12/2022     01/12/2022     Lab Results   Component Value Date    CREATININE 1.4 (H) 01/12/2022    BUN 53 (H) 01/12/2022     (H) 01/12/2022    K 3.5 01/12/2022     (H) 01/12/2022    CO2 21 01/12/2022     No results found for: INR, PROTIME     No results found for: PHOS     Recent Labs     01/10/22  0730 01/11/22  0600 01/12/22  0600   PH 7.39 7.41 7.47*   PO2ART 63* 70* 56*   XAT7YMO 33.0 32.0 30.0*   O2SAT 92.1* 94.4* 90.7*         Wt Readings from Last 3 Encounters:   01/07/22 185 lb (83.9 kg)   01/06/22 185 lb (83.9 kg)   12/16/21 186 lb 9.6 oz (84.6 kg)               ASSESMENT  Ac resp failure  covid pneumonia        PLAN  1. Cont decadron and baricitinab  2.  Cont full vent tube feed  3. cxr today and daily    1/12/2022  Burak Milan MD, M.D.

## 2022-01-12 NOTE — PROGRESS NOTES
Dr. Raúl Barnett notified via perfect serve that the patients daughter would like to speak to him. The patients wife is currently at the bedside and has been updated on patients status.

## 2022-01-12 NOTE — PROGRESS NOTES
Daughter [facility] at the bedside. [facility] and the patients spouse updated on pts current status. No signs of distress noted. Pt is resting with eyes closed. No signs of agitation.

## 2022-01-12 NOTE — PROGRESS NOTES
01/12/22 0400   Vent Information   Skin Assessment Clean, dry, & intact   Vent Type 980   Vent Mode AC/PC   Pressure Ordered 20   Rate Set 20 bmp   FiO2  50 %   SpO2 94 %   SpO2/FiO2 ratio 188   Sensitivity 3   PEEP/CPAP 12   Vent Patient Data   Peak Inspiratory Pressure 33 cmH2O   Mean Airway Pressure 19 cmH20   Rate Measured 20 br/min   Vt Exhaled 860 mL   Spontaneous Breathing Trial (SBT) RT Doc   Pulse 78   Additional Respiratory  Assessments   Resp 20   Alarm Settings   High Pressure Alarm 45 cmH2O   Delay Alarm 20 sec(s)   Low Minute Volume Alarm 2.5 L/min   Apnea (secs) 20 secs   High Respiratory Rate 45 br/min   Low Exhaled Vt  250 mL

## 2022-01-12 NOTE — PROGRESS NOTES
Pt has a slight gag reflex when ETT suctioned. Movement only to painful stimulus. Weaning down on sedation to check neuro status.

## 2022-01-12 NOTE — PROGRESS NOTES
Hospitalist Progress Note       1/12/2022 12:00 PM  Admit Date: 1/7/2022    PCP: Lev Evangelista MD     Assessment and Plan:   1. Acute hypoxic respiratory failure: This is due to COVID-19 pneumonia. CTA-no PE. Patient was intubated on 1/9. Continue vent management. Pulmonary consult appreciated. 2.  Severe COVID-19 pneumonia: Continue on dexamethasone and baricitinib. Procalcitonin is elevated-on IV Rocephin and Zithromax for bacterial pneumonia. 3.  Hypotension: Wean off Levophed drip as tolerated. 4.  Hypernatremia: Due to dehydration. Start free water via NG tube. .    5. Transaminitis: Due to COVID-19 pneumonia. Will discontinue Lipitor. May consider discontinuation of baricitinib if no improvement. Chronic problems include history of craniotomy, seizure disorder and depression. Patient Active Problem List:     History of craniotomy     Malformation of cortical development of brain ([facility] [address]     Open wedge compression fracture of T11 vertebra with routine healing     Moderate episode of recurrent major depressive disorder (HCC)     Rhinitis, allergic     S/P placement of VNS (vagus nerve stimulation) device     Stenosis of right carotid artery     Symptomatic localization-related epilepsy (HCC)     HLD     Chronic right shoulder pain     Positive FIT (fecal immunochemical test)     Open wedge compression fracture of T12 vertebra with routine healing     Pneumonia due to COVID-19 virus      Subjective:     Chief Complaint   Patient presents with    Shortness of Breath     on 6L NC    Positive For Covid-19       F/U:  Interval History: Discussed with his nurse. He is still on Levophed drip. Objective:        Intake/Output Summary (Last 24 hours) at 1/12/2022 1200  Last data filed at 1/12/2022 1141  Gross per 24 hour   Intake 1938.95 ml   Output 1225 ml   Net 713.95 ml      Vitals:   Vitals:    01/12/22 1145   BP:    Pulse: 77   Resp:    Temp:    SpO2:      Physical Exam:  General: Orally intubated. OG tube feeding. Neurologic: Sedated. Eyes: Not pale. Anicteric. Neck: No neck mass or thyromegaly  Cardiovascular: Regular. No murmur S3  Respiratory: Good air entry bilaterally. No wheezes or crepitation. Abdomen: Soft. No palpable mass. : Mcintyre catheter noted. Extremities: No pedal edema. The feet are warm.     Electronically signed by Jason Delgado MD on 1/12/2022 at 12:00 PM  [facility]

## 2022-01-12 NOTE — FLOWSHEET NOTE
Attempted visit. Patient was not available at the time of visit. Spiritual care remains available as needed.

## 2022-01-13 NOTE — PROGRESS NOTES
Hospitalist Progress Note       1/13/2022 9:57 AM  Admit Date: 1/7/2022    PCP: Hammad Krueger MD     Assessment and Plan:   1. Acute hypoxic respiratory failure: This is due to COVID-19 pneumonia. CTA-no PE. Patient was intubated on 1/9. Continue vent management. Pulmonary consult appreciated. 2.  Severe COVID-19 pneumonia: Continue on dexamethasone. Will discontinue baricitinib because of worsening LFT. Procalcitonin is elevated-on IV Rocephin and Zithromax for bacterial pneumonia. 3.  Hypotension: Levophed drip has been successfully weaned off.    4.  Hypernatremia: Due to dehydration. Increase free water to 60 mL every 6 hourly via NG tube. .    5. Transaminitis: Due to COVID-19 pneumonia. Discontinued Lipitor. Will discontinue baricitinib. Chronic problems include history of craniotomy, seizure disorder and depression. Patient Active Problem List:     History of craniotomy     Malformation of cortical development of brain ([facility] [address]     Open wedge compression fracture of T11 vertebra with routine healing     Moderate episode of recurrent major depressive disorder (HCC)     Rhinitis, allergic     S/P placement of VNS (vagus nerve stimulation) device     Stenosis of right carotid artery     Symptomatic localization-related epilepsy (HCC)     HLD     Chronic right shoulder pain     Positive FIT (fecal immunochemical test)     Open wedge compression fracture of T12 vertebra with routine healing     Pneumonia due to COVID-19 virus      Subjective:     Chief Complaint   Patient presents with    Shortness of Breath     on 6L NC    Positive For Covid-19       F/U:  Interval History: Discussed with his nurse. Levophed drip has been successfully weaned off. No reported incident overnight. Objective:        Intake/Output Summary (Last 24 hours) at 1/13/2022 0957  Last data filed at 1/13/2022 2443  Gross per 24 hour   Intake 1821.57 ml   Output 1300 ml   Net 521.57 ml      Vitals:   Vitals: 01/13/22 0937   BP: 103/71   Pulse: 84   Resp: 20   Temp:    SpO2: (!) 89%     Physical Exam:  General: Orally intubated. OG tube feeding. Neurologic: Sedated. Eyes: Not pale. Anicteric. Neck: No neck mass or thyromegaly  Cardiovascular: Regular. No murmur S3  Respiratory: Good air entry bilaterally. No wheezes or crepitation. Abdomen: Soft. No palpable mass. : Mcintyre catheter noted. Extremities: No pedal edema. The feet are warm.     Electronically signed by Josie Enriquez MD on 1/13/2022 at 9:57 AM  [facility] Hospitalist

## 2022-01-13 NOTE — PROGRESS NOTES
pulmonary      SUBJECTIVE:  Intubated sedated on vent     OBJECTIVE    VITALS:  /85   Pulse 81   Temp 101.1 °F (38.4 °C) (Rectal)   Resp 20   Ht 6' (1.829 m)   Wt 175 lb 7.8 oz (79.6 kg)   SpO2 (!) 89%   BMI 23.80 kg/m²   HEAD AND FACE EXAM:  No throat injection, no active exudate,no thrush  NECK EXAM;No JVD, no masses, symmetrical  CHEST EXAM; Expansion equal and symmetrical, no masses  LUNG EXAM; Good breath sounds bilaterally.  There are expiratory wheezes both lungs, there are crackles at both lung bases  CARDIOVASCULAR EXAM: Positive S1 and S2, no S3 or S4, no clicks ,no murmurs  RIGHT AND LEFT LOWER EXTRIMITY EXAM: No edema, no swelling, no inflamation            LABS   Lab Results   Component Value Date    WBC 16.7 (H) 01/13/2022    HGB 10.0 (L) 01/13/2022    HCT 31.6 (L) 01/13/2022    MCV 99.7 01/13/2022     01/13/2022     Lab Results   Component Value Date    CREATININE 1.3 01/13/2022    BUN 52 (H) 01/13/2022     (H) 01/13/2022    K 3.5 01/13/2022     (H) 01/13/2022    CO2 21 01/13/2022     No results found for: INR, PROTIME     No results found for: PHOS     Recent Labs     01/11/22  0600 01/12/22  0600 01/13/22  0600   PH 7.41 7.47* 7.47*   PO2ART 70* 56* 54*   XTX9MPC 32.0 30.0* 31.0*   O2SAT 94.4* 90.7* 88.7*         Wt Readings from Last 3 Encounters:   01/13/22 175 lb 7.8 oz (79.6 kg)   01/06/22 185 lb (83.9 kg)   12/16/21 186 lb 9.6 oz (84.6 kg)        EXAMINATION:   ONE XRAY VIEW OF THE CHEST       1/13/2022 5:10 am       COMPARISON:   01/12/2022       HISTORY:   ORDERING SYSTEM PROVIDED HISTORY: fu pneumonia,on vent   TECHNOLOGIST PROVIDED HISTORY:   Reason for exam:->fu pneumonia,on vent   Reason for Exam: fu pneumonia,on vent       FINDINGS:   Lines and tubes remain in place.       Patchy/hazy bilateral airspace opacities appears similar.  No new airspace   disease is seen.  No pneumothoraces are seen.  Cardiac and mediastinal   silhouettes are stable.  Stable appearance to bony structures.           Impression   Stable patchy/hazy airspace opacities bilaterally. ASSESMENT  Ac resp failure  covid pneumonia        PLAN  1. cpm  2. Cont full vent support  3. Not weanable at present   4.  Tube feed    1/13/2022  Yadiel Antoine MD, MMARTHA.

## 2022-01-13 NOTE — PROGRESS NOTES
01/13/22 0847   Vent Information   $Ventilation $Subsequent Day   Skin Assessment Clean, dry, & intact   Suction Catheter Diameter 14   Equipment Changed HME   Vent Type 980   Vent Mode AC/PC   Vt Ordered 0 mL   Rate Set 20 bmp   Peak Flow 0 L/min   Pressure Support 0 cmH20   FiO2  50 %   SpO2 91 %   SpO2/FiO2 ratio 182   Sensitivity 3   PEEP/CPAP 12   I Time/ I Time % 1 s   Humidification Source HME   Vent Patient Data   High Peep/I Pressure 20   Peak Inspiratory Pressure 33 cmH2O   Mean Airway Pressure 19 cmH20   Rate Measured 20 br/min   Vt Exhaled 911 mL   Minute Volume 18.2 Liters   I:E Ratio 1:2.00   Plateau Pressure 29 [id number]   Static Compliance 55 mL/cmH2O   Total PEEP 12 cmH20   Auto PEEP 0.3 cmH20   Spontaneous Breathing Trial (SBT) RT Doc   Pulse 83   Additional Respiratory  Assessments   Resp 20   Alarm Settings   High Pressure Alarm 45 cmH2O   Press Low Alarm 2.5 cmH2O   Delay Alarm 20 sec(s)   Low Minute Volume Alarm 25 L/min   Apnea (secs) 20 secs   High Respiratory Rate 45 br/min   Resp Rate Low Alarm 30   Low Exhaled Vt  250 mL   ETT (adult)   Placement Date/Time: 01/10/22 (c) 0011   Preoxygenation: Yes  Mask Ventilation: Ventilated by mask (1)  Technique: Video laryngoscopy  Tube Size: 8 mm  Blade Size: 3  Location: Oral  Grade View: Full view of the glottis  Insertion attempts: 2  Placeme. ..    Secured at 24 cm   Measured From [address] [place] [address],[address] By [employer] tube jain

## 2022-01-14 NOTE — PROGRESS NOTES
01/14/22 0807   Vent Information   $Ventilation $Subsequent Day   Skin Assessment Clean, dry, & intact   Suction Catheter Diameter 14   Vent Type 980   Vent Mode AC/PC   Vt Ordered 0 mL   Rate Set 20 bmp   Peak Flow 0 L/min   Pressure Support 0 cmH20   FiO2  70 %   SpO2 91 %   SpO2/FiO2 ratio 130   Sensitivity 3   PEEP/CPAP 12   I Time/ I Time % 1 s   Humidification Source HME   Vent Patient Data   High Peep/I Pressure 20   Peak Inspiratory Pressure 33 cmH2O   Mean Airway Pressure 19 cmH20   Rate Measured 20 br/min   Vt Exhaled 887 mL   Minute Volume 17.7 Liters   I:E Ratio 1:2.00   Plateau Pressure 30 [id number]   Static Compliance 50 mL/cmH2O   Total PEEP 12 cmH20   Auto PEEP 0.4 cmH20   Cough/Sputum   Sputum How Obtained Suctioned;Endotracheal   $Obtained Sample $Induced Sputum   Cough Strong   Frequency Occasional   Sputum Amount Small   Sputum Color Tan   Tenacity Thick   Spontaneous Breathing Trial (SBT) RT Doc   Pulse 71   Additional Respiratory  Assessments   Resp 20   Alarm Settings   High Pressure Alarm 45 cmH2O   Press Low Alarm 2.5 cmH2O   Delay Alarm 20 sec(s)   Low Minute Volume Alarm 2.5 L/min   Apnea (secs) 20 secs   High Respiratory Rate 45 br/min   Resp Rate Low Alarm 30   Low Exhaled Vt  250 mL   ETT (adult)   Placement Date/Time: 01/10/22 (c) 0014   Preoxygenation: Yes  Mask Ventilation: Ventilated by mask (1)  Technique: Video laryngoscopy  Tube Size: 8 mm  Blade Size: 3  Location: Oral  Grade View: Full view of the glottis  Insertion attempts: 2  Placeme. ..    Secured at 24 cm   Measured From [address] [place] [address],[address] By [employer] tube jain   Site Condition Dry

## 2022-01-14 NOTE — PROGRESS NOTES
Hospitalist Progress Note       1/14/2022 9:23 AM  Admit Date: 1/7/2022    PCP: Author Tim MD     Assessment and Plan:   1. Acute hypoxic respiratory failure: This is due to COVID-19 pneumonia. CTA-no PE. Patient was intubated on 1/9. Continue vent management. Pulmonary consult appreciated. 2.  Severe COVID-19 pneumonia: Continue on dexamethasone. Baricitinib discontinued because of worsening LFT. Procalcitonin is elevated-on IV Rocephin and Zithromax for bacterial pneumonia. 3.  Hypotension: Levophed drip has been successfully weaned off.    4.  Hypernatremia: Due to dehydration. On free water 60 mL every 6 hourly via NG tube. Gradual improvement. 5.  Transaminitis: Due to COVID-19 pneumonia. Discontinued Lipitor and baricitinib. Monitor LFT. Chronic problems include history of craniotomy, seizure disorder and depression. Patient Active Problem List:     History of craniotomy     Malformation of cortical development of brain ([facility] [address]     Open wedge compression fracture of T11 vertebra with routine healing     Moderate episode of recurrent major depressive disorder (HCC)     Rhinitis, allergic     S/P placement of VNS (vagus nerve stimulation) device     Stenosis of right carotid artery     Symptomatic localization-related epilepsy (HCC)     HLD     Chronic right shoulder pain     Positive FIT (fecal immunochemical test)     Open wedge compression fracture of T12 vertebra with routine healing     Pneumonia due to COVID-19 virus      Subjective:     Chief Complaint   Patient presents with    Shortness of Breath     on 6L NC    Positive For Covid-19       F/U:  Interval History: Discussed with his nurse. No reported incident overnight. Objective:        Intake/Output Summary (Last 24 hours) at 1/14/2022 0923  Last data filed at 1/14/2022 0625  Gross per 24 hour   Intake 1877.37 ml   Output 1200 ml   Net 677.37 ml      Vitals:   Vitals:    01/14/22 0700   BP: 103/71   Pulse: 75   Resp: 20   Temp: 100.6 °F (38.1 °C)   SpO2:      Physical Exam:  General: Orally intubated. OG tube feeding. Neurologic: Sedated. Eyes: Not pale. Anicteric. Neck: No neck mass or thyromegaly  Cardiovascular: Regular. No murmur S3  Respiratory: Good air entry bilaterally. No wheezes or crepitation. Abdomen: Soft. No palpable mass. : Mcintyre catheter noted. Extremities: No pedal edema. The feet are warm.     Electronically signed by Danika Duran MD on 1/14/2022 at 9:23 AM  [facility] Hospitalist

## 2022-01-14 NOTE — FLOWSHEET NOTE
Per patient's sister, patient has been COVID+ since 12/26/21. Patient was + by home test which was verified at his local urgent care.

## 2022-01-15 NOTE — PROGRESS NOTES
Hospitalist Progress Note       1/15/2022 9:42 AM  Admit Date: 1/7/2022    PCP: Beverley Martin MD     Assessment and Plan:   1. Acute hypoxic respiratory failure: This is due to COVID-19 pneumonia. CTA-no PE. Patient was intubated on 1/9. Continue vent management. Pulmonary consult appreciated. 2.  Severe COVID-19 pneumonia: Continue on dexamethasone. Baricitinib discontinued because of worsening LFT. Procalcitonin is elevated-on IV Rocephin and Zithromax for bacterial pneumonia. 3.  Hypotension: Levophed drip has been successfully weaned off.    4.  Hypernatremia: Due to dehydration. On free water 60 mL every 6 hourly via NG tube. Gradually improving. 5.  Transaminitis: Due to COVID-19 pneumonia. Discontinued Lipitor and baricitinib. Monitor LFT. Chronic problems include history of craniotomy, seizure disorder and depression. I called Maxine Dale () to update her on her 's current situation, but she was not available. Patient Active Problem List:     History of craniotomy     Malformation of cortical development of brain ([facility] [address]     Open wedge compression fracture of T11 vertebra with routine healing     Moderate episode of recurrent major depressive disorder (HCC)     Rhinitis, allergic     S/P placement of VNS (vagus nerve stimulation) device     Stenosis of right carotid artery     Symptomatic localization-related epilepsy (HCC)     HLD     Chronic right shoulder pain     Positive FIT (fecal immunochemical test)     Open wedge compression fracture of T12 vertebra with routine healing     Pneumonia due to COVID-19 virus      Subjective:     Chief Complaint   Patient presents with    Shortness of Breath     on 6L NC    Positive For Covid-19       F/U:  Interval History: Patient was not weanable yesterday. No reported incident overnight. Objective:        Intake/Output Summary (Last 24 hours) at 1/15/2022 0942  Last data filed at 1/15/2022 0620  Gross per 24 hour   Intake 1844.81 ml   Output 1150 ml   Net 694.81 ml      Vitals:   Vitals:    01/15/22 0815   BP: 108/72   Pulse: 74   Resp: 20   Temp: 101.1 °F (38.4 °C)   SpO2: 92%     Physical Exam:  General: Orally intubated. OG tube feeding. Neurologic: Sedated. Eyes: Not pale. Anicteric. Neck: No neck mass or thyromegaly  Cardiovascular: Regular. No murmur S3  Respiratory: Good air entry bilaterally. No wheezes or crepitation. Abdomen: Soft. No palpable mass. : Mcintyre catheter noted. Extremities: No pedal edema. The feet are warm.     Electronically signed by Josie Enriquez MD on 1/15/2022 at 9:42 AM  [facility] Hospitalist

## 2022-01-15 NOTE — PROGRESS NOTES
Per Dr. [facility], ok to start to wean down PEEP. RT/Pedro notified via phone.

## 2022-01-15 NOTE — PROGRESS NOTES
Comprehensive Nutrition Assessment    Type and Reason for Visit:  Reassess    Nutrition Recommendations/Plan:   · Continue EN as ordered    Nutrition Assessment:  Pt EN is appropriate and running at goal. Please continue EN. Malnutrition Assessment:  Malnutrition Status:  Insufficient data    Context:  Acute Illness       Estimated Daily Nutrient Needs:  Energy (kcal):  2057 ([facility] 2003b); Weight Used for Energy Requirements:  Current     Protein (g):  101-167 (1.2-2 g/kg IBW); Weight Used for Protein Requirements:  Ideal        Fluid (ml/day):  2057 or per MD; Method Used for Fluid Requirements:  1 ml/kcal      Wounds:  None       Current Nutrition Therapies:    Current Tube Feeding (TF) Orders:  · Feeding Route: Nasogastric  · Formula: Peptide Based High Protein  · Schedule: Continuous  · Current TF & Flush Orders Provides: 1080 kcal and 94 g of protein    Anthropometric Measures:  · Height: 6' (182.9 cm)  · Current Body Weight: 184 lb 15.5 oz (83.9 kg)   · Admission Body Weight: 184 lb 15.5 oz (83.9 kg)    · Usual Body Weight: 188 lb 6.4 oz (85.5 kg) (10/21/21)     · Ideal Body Weight: 178 lbs; % Ideal Body Weight 103.9 %   · BMI: 25.1  · BMI Categories: Normal Weight (BMI 18.5-24. 9)       Nutrition Diagnosis:   · Inadequate oral intake related to impaired respiratory function as evidenced by NPO or clear liquid status due to medical condition,intubation (mechanical vent)      Nutrition Interventions:   Food and/or Nutrient Delivery:  Continue Current Tube Feeding  Nutrition Education/Counseling:  Education not indicated   Coordination of Nutrition Care:  Continue to monitor while inpatient,Coordination of Community Care    Goals:  pt will receive greater than 75% of his estimated needs through EN       Nutrition Monitoring and Evaluation:   Behavioral-Environmental Outcomes:  None Identified   Food/Nutrient Intake Outcomes:  Enteral Nutrition Intake/Tolerance  Physical Signs/Symptoms Outcomes: Biochemical Data,GI Status,Hemodynamic Status,Weight     Discharge Planning:     Too soon to determine     Electronically signed by Luna Riddle RD, LD on 8/39/30 at 7:35 PM EST    Contact: [phone]

## 2022-01-15 NOTE — PROGRESS NOTES
pleural effusion.  No pneumothorax. Cardiomediastinal silhouette is within normal limits.  No acute osseous   abnormality.  Left upper extremity PICC, endotracheal tube, and enteric tube   are unchanged in position.           Impression   Interval decrease in lung volumes, with increased bibasilar airspace   opacities, representing atelectasis and/or progressed airspace disease.       Unchanged tubes and lines.             Order History    Open Order Details             ASSESMENT  Ac resp failure  covid pneumonia  Bact superadded pneumonia        PLAN  1. cpm  2. Cont full vent support  3. Tube feed  4.  Not weanable at [id number]    1/15/2022  Sonja Crook MD, MMARTHA.

## 2022-01-16 NOTE — PROGRESS NOTES
Hospitalist Progress Note       1/16/2022 10:49 AM  Admit Date: 1/7/2022    PCP: Rossy Sequeira MD     Assessment and Plan:   1. Acute hypoxic respiratory failure: This is due to COVID-19 pneumonia. CTA-no PE. Patient was intubated on 1/9. Continue vent management. Pulmonary consult appreciated. 2.  Severe COVID-19 pneumonia: Continue on dexamethasone. Baricitinib discontinued because of worsening LFT. Procalcitonin is elevated-on IV Rocephin and Zithromax for bacterial pneumonia. 3.  Hypotension: Levophed drip has been successfully weaned off.    4.  Hypernatremia: Due to dehydration. On free water 60 mL every 6 hourly via NG tube. Monitor BMP. 5.  Transaminitis: Due to COVID-19 pneumonia. Discontinued Lipitor and baricitinib. Monitor LFT. Chronic problems include history of craniotomy, seizure disorder and depression. Patient Active Problem List:     History of craniotomy     Malformation of cortical development of brain ([facility] [address]     Open wedge compression fracture of T11 vertebra with routine healing     Moderate episode of recurrent major depressive disorder (HCC)     Rhinitis, allergic     S/P placement of VNS (vagus nerve stimulation) device     Stenosis of right carotid artery     Symptomatic localization-related epilepsy (HCC)     HLD     Chronic right shoulder pain     Positive FIT (fecal immunochemical test)     Open wedge compression fracture of T12 vertebra with routine healing     Pneumonia due to COVID-19 virus      Subjective:     Chief Complaint   Patient presents with    Shortness of Breath     on 6L NC    Positive For Covid-19       F/U:  Interval History: Discussed with his nurse. Copious secretion from ET tube-suction. Objective:        Intake/Output Summary (Last 24 hours) at 1/16/2022 1049  Last data filed at 1/16/2022 0925  Gross per 24 hour   Intake 2450.44 ml   Output 1510 ml   Net 940.44 ml      Vitals:   Vitals:    01/16/22 0925   BP:    Pulse: 77   Resp: 20 Temp:    SpO2: 90%     Physical Exam:  General: Orally intubated. OG tube feeding. Neurologic: Sedated. Eyes: Not pale. Anicteric. Neck: No neck mass or thyromegaly  Cardiovascular: Regular. No murmur S3  Respiratory: Good air entry bilaterally. No wheezes or crepitation. Abdomen: Soft. No palpable mass. : Mcintyre catheter noted. Extremities: No pedal edema. The feet are warm.     Electronically signed by Petey Jeffries MD on 1/16/2022 at 10:49 AM  [facility] Hospitalist

## 2022-01-16 NOTE — PROGRESS NOTES
Called to see patient who was hypotensive and hypoxic while on ventilator. Ambubag and started on Levophed drip. Giving 1L fluid bolus. Family informed (including sister and daughter). Sister was later at bedside. He later responded. A/P:  1. Hypotension: Continue Levophed drip to keep MAP above 65. Order ECHO. Pulmonary embolism is possible- will increase Lovenox to 1mg/kg SQ q12h. Family updated. All questions and concerns addressed.

## 2022-01-17 NOTE — PROGRESS NOTES
pulmonary      SUBJECTIVE:  Intubated on vent     OBJECTIVE    VITALS:  /86   Pulse 70   Temp 97.7 °F (36.5 °C) (Rectal)   Resp 20   Ht 6' (1.829 m)   Wt 189 lb 2.5 oz (85.8 kg)   SpO2 100%   BMI 25.65 kg/m²   HEAD AND FACE EXAM:  No throat injection, no active exudate,no thrush  NECK EXAM;No JVD, no masses, symmetrical  CHEST EXAM; Expansion equal and symmetrical, no masses  LUNG EXAM; Good breath sounds bilaterally.  There are expiratory wheezes both lungs, there are crackles at both lung bases  CARDIOVASCULAR EXAM: Positive S1 and S2, no S3 or S4, no clicks ,no murmurs  RIGHT AND LEFT LOWER EXTRIMITY EXAM: No edema, no swelling, no inflamation            LABS   Lab Results   Component Value Date    WBC 22.1 (H) 01/17/2022    HGB 10.5 (L) 01/17/2022    HCT 35.0 (L) 01/17/2022    .5 (H) 01/17/2022     (H) 01/17/2022     Lab Results   Component Value Date    CREATININE 1.3 01/17/2022    BUN 57 (H) 01/17/2022     (H) 01/17/2022    K 3.9 01/17/2022     (H) 01/17/2022    CO2 21 01/17/2022     No results found for: INR, PROTIME     No results found for: PHOS     Recent Labs     01/16/22  0600 01/16/22  1100 01/17/22  0600   PH 7.47* 7.31* 7.29*   PO2ART 55* 49* 101*   QSH3PFG 29.0* 46.0* 41.0   O2SAT 87.9* 76.2* 95.3*         Wt Readings from Last 3 Encounters:   01/17/22 189 lb 2.5 oz (85.8 kg)   01/06/22 185 lb (83.9 kg)   12/16/21 186 lb 9.6 oz (84.6 kg)          EXAMINATION:   ONE XRAY VIEW OF THE CHEST       1/17/2022 6:41 am       COMPARISON:   01/16/2020.       HISTORY:   ORDERING SYSTEM PROVIDED HISTORY: fu pneumonia,on vent   TECHNOLOGIST PROVIDED HISTORY:   Reason for exam:->fu pneumonia,on vent   Reason for Exam: fu pneumonia,on vent   Additional signs and symptoms: fu pneumonia,on vent       Initial evaluation.       FINDINGS:   The endotracheal tube, enteric tube and left-sided PICC line appear unchanged   in position.  The cardiac silhouette is within normal limits for size. Suan Bunkers   is relatively unchanged opacification involving the mid to lower lung   bilaterally.  No pleural effusion or pneumothorax.           Impression   No significant change in the appearance of the chest.             ASSESMENT  Ac resp failure  covid pneumonia  Bact pneumonia        PLAN  1. cpm  2. Cont full vent support  3. Overall no improvement  4.  Tube feed    1/17/2022  Zuri Call MD, M.D.

## 2022-01-17 NOTE — PROGRESS NOTES
Hospitalist Progress Note       1/17/2022 11:46 AM  Admit Date: 1/7/2022    PCP: Patricia Alcala MD     Assessment and Plan:   1. Acute hypoxic respiratory failure: This is due to COVID-19 pneumonia. CTA-no PE. Patient was intubated on 1/9. Continue vent management. Pulmonary consult appreciated. 2.  Severe COVID-19 pneumonia: Continue on dexamethasone. Baricitinib discontinued because of worsening LFT. Procalcitonin is elevated-on IV Rocephin and Zithromax for bacterial pneumonia. 3.  Hypotension: Continue Levophed drip. Echo is pending. 4.  Hypernatremia: Due to dehydration. On free water 60 mL every 6 hourly via NG tube. Start on half-normal saline drip. Monitor BMP. 5.  Transaminitis: Due to COVID-19 pneumonia. Discontinued Lipitor and baricitinib. Monitor LFT. Chronic problems include history of craniotomy, seizure disorder and depression. DVT prophylaxis: Lovenox 80 mg twice daily  Disposition:    I discussed with daughter and ex-wife yesterday and updated them on current situation. Patient Active Problem List:     History of craniotomy     Malformation of cortical development of brain ([facility] [address]     Open wedge compression fracture of T11 vertebra with routine healing     Moderate episode of recurrent major depressive disorder (HCC)     Rhinitis, allergic     S/P placement of VNS (vagus nerve stimulation) device     Stenosis of right carotid artery     Symptomatic localization-related epilepsy (HCC)     HLD     Chronic right shoulder pain     Positive FIT (fecal immunochemical test)     Open wedge compression fracture of T12 vertebra with routine healing     Pneumonia due to COVID-19 virus      Subjective:     Chief Complaint   Patient presents with    Shortness of Breath     on 6L NC    Positive For Covid-19       F/U:  Interval History: Discussed with his nurse. No reported incident overnight. Objective:        Intake/Output Summary (Last 24 hours) at 1/17/2022 [address] [place] [address] filed at 1/17/2022 1130  Gross per 24 hour   Intake 2416.77 ml   Output 1355 ml   Net 1061.77 ml      Vitals:   Vitals:    01/17/22 1137   BP:    Pulse:    Resp: 20   Temp:    SpO2: 100%     Physical Exam:  General: Orally intubated. OG tube feeding. Neurologic: Sedated. Eyes: Not pale. Anicteric. Neck: No neck mass or thyromegaly  Cardiovascular: Regular. No murmur S3  Respiratory: Good air entry bilaterally. No wheezes or crepitation. Abdomen: Soft. No palpable mass. : Mcintyre catheter noted. Extremities: Edema of upper extremity. The feet are warm.     Electronically signed by Ge Mendoza MD on 1/17/2022 at 11:46 AM  [facility]

## 2022-01-17 NOTE — PROGRESS NOTES
01/17/22 1136   Oxygen Therapy/Pulse Ox   O2 Therapy Oxygen   O2 Device Ventilator   FiO2  80 %   Resp 20   SpO2 99 %   Pulse Oximeter Device Mode Continuous   Pulse Oximeter Device Location Finger

## 2022-01-17 NOTE — PROGRESS NOTES
01/17/22 1555   Oxygen Therapy/Pulse Ox   O2 Therapy Oxygen   O2 Device Ventilator   FiO2  70 %   Resp 20   SpO2 100 %   Pulse Oximeter Device Mode Continuous   Pulse Oximeter Device Location Finger

## 2022-01-18 NOTE — PROGRESS NOTES
1/18/2022 1105  Last data filed at 1/18/2022 0959  Gross per 24 hour   Intake 3697.8 ml   Output 1250 ml   Net 2447.8 ml      Vitals:   Vitals:    01/18/22 0959   BP: 88/60   Pulse: 74   Resp: 20   Temp:    SpO2: 91%     Physical Exam:  General: Orally intubated. OG tube feeding. Neurologic: Sedated. Eyes: Not pale. Anicteric. Neck: No neck mass or thyromegaly  Cardiovascular: Regular. No murmur S3  Respiratory: Good air entry bilaterally. No wheezes or crepitation. Abdomen: Soft. No palpable mass. : Mcintyre catheter noted. Extremities: Edema of upper extremity. The feet are warm.     Electronically signed by Josie Enriquez MD on 1/18/2022 at 11:05 AM  [facility]

## 2022-01-18 NOTE — PROGRESS NOTES
pulmonary      SUBJECTIVE: intubated on vent     OBJECTIVE    VITALS:  BP 86/63   Pulse 73   Temp 99.7 °F (37.6 °C) (Rectal)   Resp 20   Ht 6' (1.829 m)   Wt 189 lb 2.5 oz (85.8 kg)   SpO2 (!) 88%   BMI 25.65 kg/m²   HEAD AND FACE EXAM:  No throat injection, no active exudate,no thrush  NECK EXAM;No JVD, no masses, symmetrical  CHEST EXAM; Expansion equal and symmetrical, no masses  LUNG EXAM; Good breath sounds bilaterally.  There are expiratory wheezes both lungs, there are crackles at both lung bases  CARDIOVASCULAR EXAM: Positive S1 and S2, no S3 or S4, no clicks ,no murmurs  RIGHT AND LEFT LOWER EXTRIMITY EXAM: No edema, no swelling, no inflamation            LABS   Lab Results   Component Value Date    WBC 22.1 (H) 01/17/2022    HGB 10.5 (L) 01/17/2022    HCT 35.0 (L) 01/17/2022    .5 (H) 01/17/2022     (H) 01/17/2022     Lab Results   Component Value Date    CREATININE 1.3 01/17/2022    BUN 57 (H) 01/17/2022     (H) 01/17/2022    K 3.9 01/17/2022     (H) 01/17/2022    CO2 21 01/17/2022     No results found for: INR, PROTIME     No results found for: PHOS     Recent Labs     01/16/22  1100 01/17/22  0600 01/18/22  0600   PH 7.31* 7.29* 7.29*   PO2ART 49* 101* 50*   [id number] 46.0* 41.0 43.0   O2SAT 76.2* 95.3* 82.5*         Wt Readings from Last 3 Encounters:   01/17/22 189 lb 2.5 oz (85.8 kg)   01/06/22 185 lb (83.9 kg)   12/16/21 186 lb 9.6 oz (84.6 kg)        EXAMINATION:   ONE XRAY VIEW OF THE CHEST       1/18/2022 5:43 am       COMPARISON:   January 17, 2022       HISTORY:   ORDERING SYSTEM PROVIDED HISTORY: fu pneumonia,on vent   TECHNOLOGIST PROVIDED HISTORY:   Reason for exam:->fu pneumonia,on vent   Reason for Exam: fu pneumonia,on vent       FINDINGS:   Stable lines and tubes including endotracheal tube, nasogastric tube, and   left upper extremity PICC.  Stable cardiomediastinal silhouette.  The   pulmonary system demonstrates bilateral airspace opacities, worst in the mid   to lower lung zones.       No new osseous abnormality.           Impression   1. Stable lines and tubes. 2. Stable cardiopulmonary status including airspace opacities in the mid to   lower lung zones.                   ASSESMENT  Ac resp failure  covid pneumonia  Bact pneumonia        PLAN  1. cpm  2. Tube feed  3.  He remains on high vent parameters so not weanable at present    1/18/2022  Ki Byrnes MD, M.D.

## 2022-01-19 NOTE — PROGRESS NOTES
Physician Progress Note      Micky Carreon  [facility] #:                  [id number]  :                       1965  ADMIT DATE:       2022 9:37 AM  DISCH DATE:  RESPONDING  PROVIDER #:        EDDIE LOCKE          QUERY TEXT:    Attending,    Pt admitted with Covid pneumonia. Pt noted to have leukocytosis, elevated CRP   and procal, tachycardia, febrile. If possible, please document in the progress   notes and discharge summary if you are evaluating and /or treating any of the   following: The medical record reflects the following:  Risk Factors: Covid, pneumonia  Clinical Indicators: On admission : WBC 9.9, .4, Tmax 100.0, HR 82,   resp 40. On 1/10 WBC 14.9, Tmax 101.1, , resp 35, SBP in the 80's. On    procal 6.35  Treatment: IVF, IV Zithromax, po Baricitinib, IV Rocephin, IV Decadron, IV   Levophed, ICU, vent    ELLY Watson, RN  Clinical   [phone]  Options provided:  -- Sepsis, present on admission  -- Sepsis, not present on admission, developed during inpatient stay  -- Sepsis was ruled out  -- Other - I will add my own diagnosis  -- Disagree - Not applicable / Not valid  -- Disagree - Clinically unable to determine / Unknown  -- Refer to Clinical Documentation Reviewer    PROVIDER RESPONSE TEXT:    This patient has sepsis which was present on admission. Query created by: Cata Alanis on 2022 1:47 PM      QUERY TEXT:    Pt admitted with Covid pneumonia and has shock documented. If possible, please   document in progress notes and discharge summary further specificity   regarding the type of shock: The medical record reflects the following:  Risk Factors: dehydration, Covid  Clinical Indicators: Per attending progress note, \"Shock requiring pressor   support. \" SBP in 80's  Treatment: IV Levophed, ICU, vent, IVF    ELLY Watson, RN  Clinical   [phone]  Options provided:  -- Septic Shock  -- Hypovolemic Shock  -- Other - I will add my own diagnosis  -- Disagree - Not applicable / Not valid  -- Disagree - Clinically unable to determine / Unknown  -- Refer to Clinical Documentation Reviewer    PROVIDER RESPONSE TEXT:    This patient is in septic shock.     Query created by: Javid Barber on 1/19/2022 1:55 PM      Electronically signed by:  Ebenezer Feliz 1/19/2022 3:12 PM

## 2022-01-19 NOTE — PROGRESS NOTES
Comprehensive Nutrition Assessment    Type and Reason for Visit:  Reassess    Nutrition Recommendations/Plan:   · Continue EN as ordered    Nutrition Assessment:  Pt EN is appropriate and running at goal. Please continue EN. Malnutrition Assessment:  Malnutrition Status:  Insufficient data    Context:  Acute Illness       Estimated Daily Nutrient Needs:  Energy (kcal):  2057 ([facility] 2003b); Weight Used for Energy Requirements:  Current     Protein (g):  101-167 (1.2-2 g/kg IBW); Weight Used for Protein Requirements:  Ideal        Fluid (ml/day):  2057 or per MD; Method Used for Fluid Requirements:  1 ml/kcal      Wounds:  None       Current Nutrition Therapies:    Diet NPO Exceptions are: Sips of Water with Meds  ADULT TUBE FEEDING; Orogastric; Peptide Based High Protein; Continuous; 10; Yes; 15; Q 4 hours; 45; 60; Q 6 hours; Protein; Administer One ProteinX via OGT    Anthropometric Measures:  · Height: 6' (182.9 cm)  · Current Body Weight: 184 lb 15.5 oz (83.9 kg)   · Admission Body Weight: 184 lb 15.5 oz (83.9 kg)    · Usual Body Weight: 188 lb 6.4 oz (85.5 kg) (10/21/21)     · Ideal Body Weight: 178 lbs; % Ideal Body Weight 103.9 %   · BMI: 25.1  · BMI Categories: Normal Weight (BMI 18.5-24. 9)       Nutrition Diagnosis:   · Inadequate oral intake related to impaired respiratory function as evidenced by NPO or clear liquid status due to medical condition,intubation (mechanical vent)      Nutrition Interventions:   Food and/or Nutrient Delivery:  Continue Current Tube Feeding  Nutrition Education/Counseling:  Education not indicated   Coordination of Nutrition Care:  Continue to monitor while inpatient,Coordination of Community Care    Goals:  pt will receive greater than 75% of his estimated needs through EN       Nutrition Monitoring and Evaluation:   Behavioral-Environmental Outcomes:  None Identified   Food/Nutrient Intake Outcomes:  Enteral Nutrition Intake/Tolerance  Physical Signs/Symptoms Outcomes:  Biochemical Data,GI Status,Hemodynamic Status,Weight     Discharge Planning:     Too soon to determine     Electronically signed by Osvaldo Ramos RD, LD on 4/08/10 at 9:02 PM EST    Contact: [phone]

## 2022-01-19 NOTE — PROGRESS NOTES
Hospitalist Progress Note       1/19/2022 10:24 AM  Admit Date: 1/7/2022    PCP: Patricia Alcala MD     Assessment and Plan:   1. Acute hypoxic respiratory failure: This is due to COVID-19 pneumonia. CTA-no PE. Patient was intubated on 1/9. Continue vent management. Pulmonary consult appreciated. 2.  Severe COVID-19 pneumonia: Continue on dexamethasone. Baricitinib discontinued because of worsening LFT. Procalcitonin is elevated-on IV Rocephin and Zithromax for bacterial pneumonia. 3.  Hypotension: Echo-EF 50 to 55%. On midodrine-increase to 10 mg 3 times daily. Wean off Levophed drip. 4.  Hypernatremia/hypokalemia: Due to dehydration. On free water 60 mL every 6 hourly via NG tube and half-normal saline drip. Replace potassium. Monitor BMP. 5.  Transaminitis: Due to COVID-19 pneumonia. Discontinued Lipitor and baricitinib. Monitor LFT. Chronic problems include history of craniotomy, seizure disorder and depression. DVT prophylaxis: Lovenox 80 mg twice daily  Disposition: To be determined. Patient Active Problem List:     History of craniotomy     Malformation of cortical development of brain ([facility] [address]     Open wedge compression fracture of T11 vertebra with routine healing     Moderate episode of recurrent major depressive disorder (HCC)     Rhinitis, allergic     S/P placement of VNS (vagus nerve stimulation) device     Stenosis of right carotid artery     Symptomatic localization-related epilepsy (HCC)     HLD     Chronic right shoulder pain     Positive FIT (fecal immunochemical test)     Open wedge compression fracture of T12 vertebra with routine healing     Pneumonia due to COVID-19 virus      Subjective:     Chief Complaint   Patient presents with    Shortness of Breath     on 6L NC    Positive For Covid-19       F/U:  Interval History: Discussed with his nurse. Patient is still on Levophed drip. Objective:        Intake/Output Summary (Last 24 hours) at 1/19/2022 1024  Last data filed at 1/19/2022 0851  Gross per 24 hour   Intake 3698 ml   Output 1225 ml   Net 2473 ml      Vitals:   Vitals:    01/19/22 0835   BP: 102/66   Pulse: 73   Resp: 20   Temp:    SpO2: 92%     Physical Exam:  General: Orally intubated. OG tube feeding. Neurologic: Sedated. Eyes: Not pale. Anicteric. Neck: No neck mass or thyromegaly  Cardiovascular: Regular. No murmur S3  Respiratory: Good air entry bilaterally. No wheezes or crepitation. Abdomen: Soft. No palpable mass. : Mcintyre catheter noted. Extremities: Edema of upper extremity. The feet are warm.     Electronically signed by Yamil Boyce MD on 1/19/2022 at 10:24 AM  [facility]

## 2022-01-19 NOTE — PROGRESS NOTES
pulmonary      SUBJECTIVE: intubated sedated on vent     OBJECTIVE    VITALS:  /67   Pulse 76   Temp 99.1 °F (37.3 °C) (Rectal)   Resp 20   Ht 6' (1.829 m)   Wt 189 lb 2.5 oz (85.8 kg)   SpO2 92%   BMI 25.65 kg/m²   HEAD AND FACE EXAM:  No throat injection, no active exudate,no thrush  NECK EXAM;No JVD, no masses, symmetrical  CHEST EXAM; Expansion equal and symmetrical, no masses  LUNG EXAM; Good breath sounds bilaterally. There are expiratory wheezes both lungs, there are crackles at both lung bases  CARDIOVASCULAR EXAM: Positive S1 and S2, no S3 or S4, no clicks ,no murmurs  RIGHT AND LEFT LOWER EXTRIMITY EXAM: No edema, no swelling, no inflamation            LABS   Lab Results   Component Value Date    WBC 22.1 (H) 01/17/2022    HGB 10.5 (L) 01/17/2022    HCT 35.0 (L) 01/17/2022    .5 (H) 01/17/2022     (H) 01/17/2022     Lab Results   Component Value Date    CREATININE 1.3 01/17/2022    BUN 57 (H) 01/17/2022     (H) 01/17/2022    K 3.9 01/17/2022     (H) 01/17/2022    CO2 21 01/17/2022     No results found for: INR, PROTIME     No results found for: PHOS     Recent Labs     01/17/22  0600 01/18/22  0600 01/19/22  0600   PH 7.29* 7.29* 7.33*   PO2ART 101* 50* 62*   [id number] 41.0 43.0 41.0   O2SAT 95.3* 82.5* 89.9*         Wt Readings from Last 3 Encounters:   01/17/22 189 lb 2.5 oz (85.8 kg)   01/06/22 185 lb (83.9 kg)   12/16/21 186 lb 9.6 oz (84.6 kg)               ASSESMENT  Ac resp failure  covid pneumonia  Bact pneumonia        PLAN  1. cpm  2. Cont full vent support  3. Not weanable at present  4. cxr pending  5.  Tube feed    1/19/2022  Eden Case MD, M.D.

## 2022-01-20 NOTE — CARE COORDINATION
Spoke to staff at [place] [address] regarding NOK status. His daughter Zay Candelario is his NOK and primary decision maker at this time as patient is . Ex Wife also visits daily (no name on record) .  Jus Zuluaga RN

## 2022-01-20 NOTE — PROGRESS NOTES
Hospitalist Progress Note       1/20/2022 9:27 AM  Admit Date: 1/7/2022    PCP: Denise Lawson MD     Assessment and Plan:   1. Acute hypoxic respiratory failure: This is due to COVID-19 pneumonia. CTA-no PE. Patient was intubated on 1/9. Continue vent management. Pulmonary consult appreciated. 2.  Severe COVID-19 pneumonia: Status post dexamethasone. Baricitinib discontinued because of worsening LFT. Procalcitonin is elevated-on IV Rocephin and Zithromax for secondary bacterial pneumonia. 3.  Hypotension: Echo-EF 50 to 55%. On midodrine. Levophed drip successfully weaned off.    4.  Hypernatremia/hypokalemia: Due to dehydration. On free water 60 mL every 6 hourly via NG tube and half-normal saline drip. Morning lab is pending. Monitor BMP. 5.  Transaminitis: Due to COVID-19 pneumonia. Discontinued Lipitor and baricitinib. Monitor LFT. Chronic problems include history of craniotomy, seizure disorder and depression. DVT prophylaxis: Lovenox 80 mg twice daily  Disposition: To be determined. Patient Active Problem List:     History of craniotomy     Malformation of cortical development of brain ([facility] [address]     Open wedge compression fracture of T11 vertebra with routine healing     Moderate episode of recurrent major depressive disorder (HCC)     Rhinitis, allergic     S/P placement of VNS (vagus nerve stimulation) device     Stenosis of right carotid artery     Symptomatic localization-related epilepsy (HCC)     HLD     Chronic right shoulder pain     Positive FIT (fecal immunochemical test)     Open wedge compression fracture of T12 vertebra with routine healing     Pneumonia due to COVID-19 virus      Subjective:     Chief Complaint   Patient presents with    Shortness of Breath     on 6L NC    Positive For Covid-19       F/U:  Interval History: Discussed with his nurse. Patient is still on Levophed drip. Objective:        Intake/Output Summary (Last 24 hours) at 1/20/2022 Shane filed at 1/20/2022 0653  Gross per 24 hour   Intake 3342 ml   Output 1625 ml   Net 1717 ml      Vitals:   Vitals:    01/20/22 0716   BP: 106/67   Pulse: 83   Resp: 25   Temp: 101.1 °F (38.4 °C)   SpO2: (!) 89%     Physical Exam:  General: Orally intubated. OG tube feeding. Neurologic: Sedated. Eyes: Not pale. Anicteric. Neck: No neck mass or thyromegaly  Cardiovascular: Regular. No murmur S3  Respiratory: Good air entry bilaterally. No wheezes or crepitation. Abdomen: Soft. No palpable mass. : Mcintyre catheter noted. Extremities: Edema of upper extremity. The feet are warm.     Electronically signed by Aurelio Canavan, MD on 1/20/2022 at 9:27 AM  [facility]

## 2022-01-20 NOTE — PROGRESS NOTES
pulmonary      SUBJECTIVE: intubated on vent     OBJECTIVE    VITALS:  BP (!) 99/59   Pulse 76   Temp 101.1 °F (38.4 °C) (Rectal)   Resp 19   Ht 6' (1.829 m)   Wt 189 lb 2.5 oz (85.8 kg)   SpO2 90%   BMI 25.65 kg/m²   HEAD AND FACE EXAM:  No throat injection, no active exudate,no thrush  NECK EXAM;No JVD, no masses, symmetrical  CHEST EXAM; Expansion equal and symmetrical, no masses  LUNG EXAM; Good breath sounds bilaterally.  There are expiratory wheezes both lungs, there are crackles at both lung bases  CARDIOVASCULAR EXAM: Positive S1 and S2, no S3 or S4, no clicks ,no murmurs  RIGHT AND LEFT LOWER EXTRIMITY EXAM: No edema, no swelling, no inflamation            LABS   Lab Results   Component Value Date    WBC 15.0 (H) 01/19/2022    HGB 9.1 (L) 01/19/2022    HCT 29.5 (L) 01/19/2022    .3 (H) 01/19/2022     01/19/2022     Lab Results   Component Value Date    CREATININE 1.0 01/19/2022    BUN 59 (H) 01/19/2022     (H) 01/19/2022    K 3.3 (L) 01/19/2022     (H) 01/19/2022    CO2 21 01/19/2022     No results found for: INR, PROTIME     No results found for: PHOS     Recent Labs     01/18/22  0600 01/19/22  0600 01/20/22  0600   PH 7.29* 7.33* 7.39   PO2ART 50* 62* 91   [id number] 43.0 41.0 37.0   O2SAT 82.5* 89.9* 95.9*         Wt Readings from Last 3 Encounters:   01/17/22 189 lb 2.5 oz (85.8 kg)   01/06/22 185 lb (83.9 kg)   12/16/21 186 lb 9.6 oz (84.6 kg)        EXAMINATION:   ONE XRAY VIEW OF THE CHEST       1/20/2022 6:45 am       COMPARISON:   January 19, 2022       HISTORY:   ORDERING SYSTEM PROVIDED HISTORY: Pneumonia; acute respiratory failure       FINDINGS:   Endotracheal tube terminates between the clavicular heads in satisfactory   position.  Enteric tube terminates in the stomach, unchanged.  Left upper   extremity PICC terminates near the cavoatrial junction in satisfactory   position.  Cardiomediastinal silhouette appears unchanged.  Multifocal   bilateral perihilar and

## 2022-01-20 NOTE — PROGRESS NOTES
01/20/22 0443   Vent Information   Vent Type 980   Vent Mode AC/PC   Vt Ordered 0 mL   Rate Set 20 bmp   Peak Flow 0 L/min   Pressure Support 0 cmH20   FiO2  100 %   Sensitivity 3   PEEP/CPAP 15  (increased to 15 since SpO2 still in mid 80s after bath.)   I Time/ I Time % 0 s   Humidification Source HME   Vent Patient Data   High Peep/I Pressure 20   Peak Inspiratory Pressure 37 cmH2O   Mean Airway Pressure 23 cmH20   Rate Measured 27 br/min   Vt Exhaled 663 mL   Minute Volume 18.3 Liters   I:E Ratio 1:1.80

## 2022-01-21 NOTE — PROGRESS NOTES
01/20/22 2040   Vent Information   Vent Type 980   Vent Mode AC/PC   Vt Ordered 0 mL   Pressure Ordered 20   Rate Set 20 bmp   Peak Flow 0 L/min   Pressure Support 0 cmH20   FiO2  80 %   SpO2 92 %   SpO2/FiO2 ratio 115   Sensitivity 3   PEEP/CPAP 15   I Time/ I Time % 0 s   Humidification Source HME   Nitric Oxide/Epoprostenol In Use? No   Vent Patient Data   High Peep/I Pressure 20   Peak Inspiratory Pressure 36 cmH2O   Mean Airway Pressure 21 cmH20   Rate Measured 21 br/min   Vt Exhaled 638 mL   Minute Volume 14.1 Liters   I:E Ratio 1:2.00   Plateau Pressure 31 [id number]   Static Compliance 38 mL/cmH2O   Total PEEP 15 cmH20   Auto PEEP 0.4 cmH20   Cough/Sputum   Sputum How Obtained Suctioned;Oral;Endotracheal   $Obtained Sample $Induced Sputum   Cough Moist;Productive   Frequency Occasional   Sputum Amount Small   Sputum Color Clear;Cloudy; White   Tenacity Thin   Spontaneous Breathing Trial (SBT) RT Doc   Pulse 77   Breath Sounds   Right Upper Lobe Diminished   Right Middle Lobe Diminished   Right Lower Lobe Diminished   Left Upper Lobe Diminished   Left Lower Lobe Diminished   Additional Respiratory  Assessments   Resp 21   Position Semi-Phelps's   Oral Care Completed? Yes   Alarm Settings   High Pressure Alarm 45 cmH2O   Delay Alarm 20 sec(s)   Low Minute Volume Alarm 2.5 L/min   Apnea (secs) 20 secs   High Respiratory Rate 40 br/min   Low Exhaled Vt  250 mL   ETT (adult)   Placement Date/Time: 01/10/22 (c) 0014   Preoxygenation: Yes  Mask Ventilation: Ventilated by mask (1)  Technique: Video laryngoscopy  Tube Size: 8 mm  Blade Size: 3  Location: Oral  Grade View: Full view of the glottis  Insertion attempts: 2  Placeme. ..    Secured at 24 cm   Measured From [address] [place] [address],[address] By [employer] tube jain   Site Condition Dry

## 2022-01-21 NOTE — PROGRESS NOTES
01/21/22 0139   Vent Information   Equipment Changed HME   Vent Type 980   Vent Mode AC/PC   Vt Ordered 0 mL   Pressure Ordered 20   Rate Set 20 bmp   Peak Flow 0 L/min   Pressure Support 0 cmH20   FiO2  80 %   Sensitivity 3   PEEP/CPAP 15   I Time/ I Time % 0 s   Humidification Source HME   Nitric Oxide/Epoprostenol In Use? No   Vent Patient Data   High Peep/I Pressure 20   Peak Inspiratory Pressure 36 cmH2O   Mean Airway Pressure 22 cmH20   Rate Measured 23 br/min   Vt Exhaled 855 mL   Minute Volume 18.6 Liters   I:E Ratio 1:2.60   Plateau Pressure 28 BFL78   Static Compliance 42 mL/cmH2O   Cough/Sputum   Sputum How Obtained Suctioned;Oral;Endotracheal   $Obtained Sample $Induced Sputum   Cough Productive;Moist   Frequency Occasional   Sputum Amount Small   Sputum Color Clear; White   Tenacity Thin   Spontaneous Breathing Trial (SBT) RT Doc   Pulse 95   Breath Sounds   Right Upper Lobe Diminished   Right Middle Lobe Diminished   Right Lower Lobe Diminished   Left Upper Lobe Diminished   Left Lower Lobe Diminished   Additional Respiratory  Assessments   Resp 21   Position Semi-Phelps's   Oral Care Completed? No   Alarm Settings   High Pressure Alarm 45 cmH2O   Delay Alarm 20 sec(s)   Low Minute Volume Alarm 2.5 L/min   Apnea (secs) 20 secs   High Respiratory Rate 40 br/min   Low Exhaled Vt  250 mL   Patient Observation   Observations 8.0 ETT   ETT (adult)   Placement Date/Time: 01/10/22 (c) 0014   Preoxygenation: Yes  Mask Ventilation: Ventilated by mask (1)  Technique: Video laryngoscopy  Tube Size: 8 mm  Blade Size: 3  Location: Oral  Grade View: Full view of the glottis  Insertion attempts: 2  Placeme. ..    Secured at 24 cm   Measured From [address] [place] [address],[address] By [employer] tube jain   Site Condition Dry

## 2022-01-21 NOTE — PROGRESS NOTES
pulmonary      SUBJECTIVE: intubated on vent     OBJECTIVE    VITALS:  /77   Pulse 82   Temp 99.7 °F (37.6 °C) (Rectal)   Resp 24   Ht 6' (1.829 m)   Wt 189 lb 2.5 oz (85.8 kg)   SpO2 (!) 89%   BMI 25.65 kg/m²   HEAD AND FACE EXAM:  No throat injection, no active exudate,no thrush  NECK EXAM;No JVD, no masses, symmetrical  CHEST EXAM; Expansion equal and symmetrical, no masses  LUNG EXAM; Good breath sounds bilaterally.  There are expiratory wheezes both lungs, there are crackles at both lung bases  CARDIOVASCULAR EXAM: Positive S1 and S2, no S3 or S4, no clicks ,no murmurs  RIGHT AND LEFT LOWER EXTRIMITY EXAM: No edema, no swelling, no inflamation            LABS   Lab Results   Component Value Date    WBC 23.5 (H) 01/21/2022    HGB 9.3 (L) 01/21/2022    HCT 31.2 (L) 01/21/2022    .8 (H) 01/21/2022     (H) 01/21/2022     Lab Results   Component Value Date    CREATININE 1.0 01/21/2022    BUN 57 (H) 01/21/2022     (H) 01/21/2022    K 4.0 01/21/2022     (H) 01/21/2022    CO2 23 01/21/2022     No results found for: INR, PROTIME     No results found for: PHOS     Recent Labs     01/19/22  0600 01/20/22  0600 01/21/22  0600   PH 7.33* 7.39 7.37   PO2ART 62* 91 50*   OXD1WZX 41.0 37.0 40.0   O2SAT 89.9* 95.9* 84.7*         Wt Readings from Last 3 Encounters:   01/17/22 189 lb 2.5 oz (85.8 kg)   01/06/22 185 lb (83.9 kg)   12/16/21 186 lb 9.6 oz (84.6 kg)          EXAMINATION:   ONE XRAY VIEW OF THE CHEST       1/21/2022 6:38 am       COMPARISON:   Chest radiograph of 01/20/2022       HISTORY:   ORDERING SYSTEM PROVIDED HISTORY: fu pneumonia,on vent   TECHNOLOGIST PROVIDED HISTORY:   Reason for exam:->fu pneumonia,on vent   Reason for Exam: fu pneumonia, vent       FINDINGS:   Cardiomediastinal silhouette is unchanged.  Overall similar appearance of   extensive airspace opacities with basilar predominance.  No pneumothorax.  No   definite pleural effusion.  No acute osseous abnormality.  Endotracheal and   enteric tubes as well as a left upper extremity PICC are unchanged in   position.           Impression   No significant interval change comparison to 01/20/2022.  Similar extensive   bibasilar predominant airspace opacities.  Unchanged position of tubes and   lines.                 ASSESMENT  Ac resp failure  covid pneumonia  Bact pneumonia        PLAN  1. antibx  2. Decadron and baricitinab  3. Cont full vent support  4. Not weanable at present  5.  Tube feed    1/21/2022  Graciela Hummel MD, M.D.

## 2022-01-21 NOTE — PROGRESS NOTES
01/21/22 0746   Oxygen Therapy/Pulse Ox   O2 Therapy Oxygen   O2 Device Ventilator   FiO2  80 %   Resp 27   SpO2 91 %   Pulse Oximeter Device Mode Continuous   Pulse Oximeter Device Location Toe   $Pulse Oximeter $Spot check (multiple/continuous)

## 2022-01-21 NOTE — PLAN OF CARE
Problem: Falls - Risk of:  Goal: Will remain free from falls  Description: Will remain free from falls  Outcome: Ongoing  Goal: Absence of physical injury  Description: Absence of physical injury  Outcome: Ongoing     Problem: Skin Integrity:  Goal: Will show no infection signs and symptoms  Description: Will show no infection signs and symptoms  Outcome: Ongoing  Goal: Absence of new skin breakdown  Description: Absence of new skin breakdown  Outcome: Ongoing     Problem: Airway Clearance - Ineffective  Goal: Achieve or maintain patent airway  Outcome: Ongoing     Problem: Gas Exchange - Impaired  Goal: Absence of hypoxia  Outcome: Ongoing  Goal: Promote optimal lung function  Outcome: Ongoing     Problem: Breathing Pattern - Ineffective  Goal: Ability to achieve and maintain a regular respiratory rate  Outcome: Ongoing     Problem:  Body Temperature -  Risk of, Imbalanced  Goal: Ability to maintain a body temperature within defined limits  Outcome: Ongoing  Goal: Will regain or maintain usual level of consciousness  Outcome: Ongoing  Goal: Complications related to the disease process, condition or treatment will be avoided or minimized  Outcome: Ongoing     Problem: Isolation Precautions - Risk of Spread of Infection  Goal: Prevent transmission of infection  Outcome: Ongoing     Problem: Nutrition Deficits  Goal: Optimize nutritional status  Outcome: Ongoing     Problem: Risk for Fluid Volume Deficit  Goal: Maintain normal heart rhythm  Outcome: Ongoing  Goal: Maintain absence of muscle cramping  Outcome: Ongoing  Goal: Maintain normal serum potassium, sodium, calcium, phosphorus, and pH  Outcome: Ongoing     Problem: Loneliness or Risk for Loneliness  Goal: Demonstrate positive use of time alone when socialization is not possible  Outcome: Ongoing     Problem: Fatigue  Goal: Verbalize increase energy and improved vitality  Outcome: Ongoing     Problem: Patient Education: Go to Patient Education Activity  Goal: Patient/Family Education  Outcome: Ongoing     Problem: Nutrition  Goal: Optimal nutrition therapy  Outcome: Ongoing     Problem: Pain:  Description: Pain management should include both nonpharmacologic and pharmacologic interventions.   Goal: Pain level will decrease  Description: Pain level will decrease  Outcome: Ongoing  Goal: Control of acute pain  Description: Control of acute pain  Outcome: Ongoing  Goal: Control of chronic pain  Description: Control of chronic pain  Outcome: Ongoing

## 2022-01-21 NOTE — PROGRESS NOTES
Hospitalist Progress Note       1/21/2022 10:23 AM  Admit Date: 1/7/2022    PCP: Rossy Sequeira MD     Assessment and Plan:   1. Acute hypoxic respiratory failure: This is due to COVID-19 pneumonia. CTA-no PE. Patient was intubated on 1/9. Continue vent management. Pulmonary consult appreciated. 2.  Severe COVID-19 pneumonia: Status post dexamethasone. Baricitinib discontinued because of worsening LFT. Procalcitonin is elevated-on IV Rocephin and Zithromax (day 10) for secondary bacterial pneumonia. Consult ID. 3.  Hypotension: Echo-EF 50 to 55%. On midodrine. Levophed drip successfully weaned off.    4.  Hypernatremia/hypokalemia: Due to dehydration. On free water 60 mL every 6 hourly via NG tube and half-normal saline drip. Morning lab is pending. Monitor BMP. 5.  Transaminitis: Due to COVID-19 pneumonia. Discontinued Lipitor and baricitinib. Monitor LFT. Chronic problems include history of craniotomy, seizure disorder and depression. DVT prophylaxis: Lovenox 80 mg twice daily  Disposition: To be determined. Patient Active Problem List:     History of craniotomy     Malformation of cortical development of brain ([facility] [address]     Open wedge compression fracture of T11 vertebra with routine healing     Moderate episode of recurrent major depressive disorder (HCC)     Rhinitis, allergic     S/P placement of VNS (vagus nerve stimulation) device     Stenosis of right carotid artery     Symptomatic localization-related epilepsy (HCC)     HLD     Chronic right shoulder pain     Positive FIT (fecal immunochemical test)     Open wedge compression fracture of T12 vertebra with routine healing     Pneumonia due to COVID-19 virus      Subjective:     Chief Complaint   Patient presents with    Shortness of Breath     on 6L NC    Positive For Covid-19       F/U:  Interval History: Discussed with his nurse. He is currently not on Levophed drip. No reported incidents overnight.       Objective: Intake/Output Summary (Last 24 hours) at 1/21/2022 1023  Last data filed at 1/21/2022 0530  Gross per 24 hour   Intake 2879.83 ml   Output 1385 ml   Net 1494.83 ml      Vitals:   Vitals:    01/21/22 0900   BP: 106/69   Pulse: 83   Resp: 27   Temp: 99.7 °F (37.6 °C)   SpO2: 91%     Physical Exam:  General: Orally intubated. OG tube feeding. Neurologic: Sedated. Eyes: Not pale. Anicteric. Neck: No neck mass or thyromegaly  Cardiovascular: Regular. No murmur S3  Respiratory: Good air entry bilaterally. No wheezes or crepitation. Abdomen: Soft. No palpable mass. : Mcintyre catheter noted. Extremities: Mild edema of upper and lower extremities. The feet are warm.     Electronically signed by Ana Wang MD on 1/21/2022 at 10:23 AM  Rounding Hospitalist

## 2022-01-22 NOTE — PROGRESS NOTES
01/22/22 1130   Vent Information   Equipment Changed HME   Vent Type 980   Vent Mode AC/PC   Vt Ordered 0 mL   Pressure Ordered 20   Rate Set 20 bmp   Peak Flow 0 L/min   Pressure Support 0 cmH20   FiO2  80 %   SpO2 97 %   SpO2/FiO2 ratio 121.25   Sensitivity 3   PEEP/CPAP 15   I Time/ I Time % 0 s   Humidification Source HME   Vent Patient Data   High Peep/I Pressure 20   Peak Inspiratory Pressure 36 cmH2O   Mean Airway Pressure 21 cmH20   Rate Measured 20 br/min   Vt Exhaled 732 mL   Minute Volume 14.6 Liters   I:E Ratio 1:2.80   Spontaneous Breathing Trial (SBT) RT Doc   Pulse 91   Breath Sounds   Right Upper Lobe Diminished   Right Middle Lobe Diminished   Right Lower Lobe Diminished   Left Upper Lobe Diminished   Left Lower Lobe Diminished   Additional Respiratory  Assessments   Resp 20   Alarm Settings   High Pressure Alarm 45 cmH2O   Delay Alarm 20 sec(s)   Low Minute Volume Alarm 2.5 L/min   Apnea (secs) 20 secs   High Respiratory Rate 40 br/min   Low Exhaled Vt  250 mL   ETT (adult)   Placement Date/Time: 01/10/22 (c) 0014   Preoxygenation: Yes  Mask Ventilation: Ventilated by mask (1)  Technique: Video laryngoscopy  Tube Size: 8 mm  Blade Size: 3  Location: Oral  Grade View: Full view of the glottis  Insertion attempts: 2  Placeme. ..    Secured at 24 cm   Measured From [address] [place] [address],[address] By [employer] tube jain   Site Condition Dry

## 2022-01-22 NOTE — PROGRESS NOTES
Hospitalist Progress Note       1/22/2022 11:19 AM  Admit Date: 1/7/2022    PCP: Deborah Boyer MD     Assessment and Plan:   1. Acute hypoxic respiratory failure: This is due to COVID-19 pneumonia. CTA-no PE. Patient was intubated on 1/9. Continue vent management. Pulmonary consult appreciated. 2.  Severe COVID-19 pneumonia: Status post dexamethasone. Baricitinib discontinued because of worsening LFT. Procalcitonin is elevated-on IV Rocephin and Zithromax (day 12) for secondary bacterial pneumonia. Consult ID. 3.  Hypotension: Echo-EF 50 to 55%. On midodrine. We will give albumin infusion. Levophed drip successfully weaned off.    4.  Hypernatremia/hypokalemia: Due to dehydration. Resolved. Monitor BMP. 5.  Transaminitis: Due to COVID-19 pneumonia. Discontinued Lipitor and baricitinib. Monitor LFT. 6.  Acute on chronic anemia: Sent stool for fecal occult blood. Add Protonix to Pepcid. Repeat H&H in 12 hours. Transfuse if hemoglobin is less than 7.    7.  Hypocalcemia: Mostly due to hypoalbuminemia. Give calcium gluconate. Check ionized calcium level. Chronic problems include history of craniotomy, seizure disorder and depression. DVT prophylaxis: Lovenox 80 mg twice daily  Disposition: To be determined.     Patient Active Problem List:     History of craniotomy     Malformation of cortical development of brain ([facility] [address]     Open wedge compression fracture of T11 vertebra with routine healing     Moderate episode of recurrent major depressive disorder (HCC)     Rhinitis, allergic     S/P placement of VNS (vagus nerve stimulation) device     Stenosis of right carotid artery     Symptomatic localization-related epilepsy (HCC)     HLD     Chronic right shoulder pain     Positive FIT (fecal immunochemical test)     Open wedge compression fracture of T12 vertebra with routine healing     Pneumonia due to COVID-19 virus      Subjective:     Chief Complaint   Patient presents with    Shortness of Breath     on 6L NC    Positive For Covid-19       F/U:  Interval History: Discussed with his nurse. Sister at bedside. Patient remains edematous. Hemoglobin dropped to 7.7 today. No obvious rectal bleed (on Lovenox 80 mg twice daily). Objective: Intake/Output Summary (Last 24 hours) at 1/22/2022 1119  Last data filed at 1/22/2022 0602  Gross per 24 hour   Intake 3901.52 ml   Output 1525 ml   Net 2376.52 ml      Vitals:   Vitals:    01/22/22 1000   BP: 109/75   Pulse: 94   Resp: 21   Temp:    SpO2: 95%     Physical Exam:  General: Orally intubated. OG tube feeding. Neurologic: Sedated. Eyes: Not pale. Anicteric. Neck: No neck mass or thyromegaly  Cardiovascular: Regular. No murmur S3  Respiratory: Good air entry bilaterally. No wheezes or crepitation. Abdomen: Soft. No palpable mass. : Mcintyre catheter noted. Extremities: Mild edema of upper and lower extremities. The feet are warm.     Electronically signed by Yamil Boyce MD on 1/22/2022 at 11:19 AM  [facility] Hospitalist

## 2022-01-22 NOTE — PROGRESS NOTES
pulmonary      SUBJECTIVE:  Intubated on vent     OBJECTIVE    VITALS:  /75   Pulse 100   Temp 100.8 °F (38.2 °C) (Rectal)   Resp 19   Ht 6' (1.829 m)   Wt 189 lb 2.5 oz (85.8 kg)   SpO2 97%   BMI 25.65 kg/m²   HEAD AND FACE EXAM:  No throat injection, no active exudate,no thrush  NECK EXAM;No JVD, no masses, symmetrical  CHEST EXAM; Expansion equal and symmetrical, no masses  LUNG EXAM; Good breath sounds bilaterally.  There are expiratory wheezes both lungs, there are crackles at both lung bases  CARDIOVASCULAR EXAM: Positive S1 and S2, no S3 or S4, no clicks ,no murmurs  RIGHT AND LEFT LOWER EXTRIMITY EXAM: No edema, no swelling, no inflamation            LABS   Lab Results   Component Value Date    WBC 16.5 (H) 01/22/2022    HGB 7.7 (L) 01/22/2022    HCT 24.3 (L) 01/22/2022    .1 (H) 01/22/2022     01/22/2022     Lab Results   Component Value Date    CREATININE 0.8 (L) 01/22/2022    BUN 45 (H) 01/22/2022     01/22/2022    K 3.6 01/22/2022     (H) 01/22/2022    CO2 21 01/22/2022     No results found for: INR, PROTIME     No results found for: PHOS     Recent Labs     01/20/22  0600 01/21/22  0600 01/22/22  0600   PH 7.39 7.37 7.34   PO2ART 91 50* 59*   [id number] 37.0 40.0 44.0   O2SAT 95.9* 84.7* 88.1*         Wt Readings from Last 3 Encounters:   01/17/22 189 lb 2.5 oz (85.8 kg)   01/06/22 185 lb (83.9 kg)   12/16/21 186 lb 9.6 oz (84.6 kg)          EXAMINATION:   ONE XRAY VIEW OF THE CHEST       1/22/2022 5:45 am       COMPARISON:   Chest radiograph dated January 21, 2022       HISTORY:   ORDERING SYSTEM PROVIDED HISTORY: fu pneumonia,on vent   TECHNOLOGIST PROVIDED HISTORY:   Reason for exam:->fu pneumonia,on vent   Reason for Exam: fu pneumonia,on vent       FINDINGS:   Tip of the ET tube is 9.1 cm above the nicol.  The enteric tube extends   below the diaphragm and is coiled within the fundus of the stomach.  The   cardiomediastinal silhouette is stable.  A left-sided PICC line is seen. Bibasilar parenchymal opacities are seen without significant change.  There   is no evidence of a pneumothorax.           Impression   1. Bilateral airspace opacities without significant change. 2. Tip of the ET tube is 9.1 cm above the nicol.  This should be further   advanced. ASSESMENT  Ac resp failure  covid pneiumonia  Bact pneumonia        PLAN  1. cpm  2. Cont full vent support  3. Tube feed  4. Not weanable at present  5. Et tube 9 cm above nicol.  I will repeat cxr and if it is 9 cm will push it in 5 cm    1/22/2022  Zuri Call MD, MMARTHA.

## 2022-01-22 NOTE — PROGRESS NOTES
Comprehensive Nutrition Assessment    Type and Reason for Visit:  Reassess    Nutrition Recommendations/Plan:   · Continue current EN as ordered - please advance to goal    Nutrition Assessment:  Pt EN is now only at 25 mL/hr - please advance to goal as soon as able    Malnutrition Assessment:  Malnutrition Status:  Insufficient data    Context:  Acute Illness       Estimated Daily Nutrient Needs:  Energy (kcal):  2057 ([facility] 2003b); Weight Used for Energy Requirements:  Current     Protein (g):  101-167 (1.2-2 g/kg IBW); Weight Used for Protein Requirements:  Ideal        Fluid (ml/day):  2057 or per MD; Method Used for Fluid Requirements:  1 ml/kcal      Wounds:  None       Current Nutrition Therapies:    Current Tube Feeding (TF) Orders:  · Feeding Route: Nasogastric  · Formula: Peptide Based High Protein  · Schedule: Continuous  · Current TF & Flush Orders Provides: 1080 kcal and 94 g of protein    Anthropometric Measures:  · Height: 6' (182.9 cm)  · Current Body Weight: 184 lb 15.5 oz (83.9 kg)   · Admission Body Weight: 184 lb 15.5 oz (83.9 kg)    · Usual Body Weight: 188 lb 6.4 oz (85.5 kg) (10/21/21)     · Ideal Body Weight: 178 lbs; % Ideal Body Weight 103.9 %   · BMI: 25.1  · BMI Categories: Normal Weight (BMI 18.5-24. 9)       Nutrition Diagnosis:   · Inadequate oral intake related to impaired respiratory function as evidenced by NPO or clear liquid status due to medical condition,intubation (mechanical vent)      Nutrition Interventions:   Food and/or Nutrient Delivery:  Continue Current Tube Feeding  Nutrition Education/Counseling:  Education not indicated   Coordination of Nutrition Care:  Continue to monitor while inpatient,Coordination of Community Care    Goals:  pt will receive greater than 75% of his estimated needs through EN       Nutrition Monitoring and Evaluation:   Behavioral-Environmental Outcomes:  None Identified   Food/Nutrient Intake Outcomes:  Enteral Nutrition Intake/Tolerance  Physical Signs/Symptoms Outcomes:  Biochemical Data,GI Status,Hemodynamic Status,Weight     Discharge Planning:     Too soon to determine     Electronically signed by Kun Hernandez RD, LD on 2/04/07 at 8:55 PM EST    Contact: [phone]

## 2022-01-22 NOTE — PROGRESS NOTES
01/22/22 0717   Vent Information   $Ventilation $Subsequent Day   Vent Type 980   Vent Mode AC/PC   Vt Ordered 0 mL   Pressure Ordered 20   Rate Set 20 bmp   Peak Flow 0 L/min   Pressure Support 0 cmH20   FiO2  80 %   SpO2 93 %   SpO2/FiO2 ratio 116.25   Sensitivity 3   PEEP/CPAP 15   I Time/ I Time % 0 s   Humidification Source HME   Vent Patient Data   High Peep/I Pressure 20   Peak Inspiratory Pressure 36 cmH2O   Mean Airway Pressure 21 cmH20   Rate Measured 21 br/min   Vt Exhaled 770 mL   Minute Volume 15.2 Liters   I:E Ratio 1:2.80   Plateau Pressure 33 LUW13   Static Compliance 33 mL/cmH2O   Total PEEP 15 cmH20   Auto PEEP 0 cmH20   Cough/Sputum   Sputum How Obtained Endotracheal;Suctioned   $Obtained Sample $Induced Sputum   Cough Non-productive   Frequency Infrequent   Sputum Amount None   Sputum Color None   Tenacity None   Spontaneous Breathing Trial (SBT) RT Doc   Pulse 90   Breath Sounds   Right Upper Lobe Diminished   Right Middle Lobe Diminished   Right Lower Lobe Diminished   Left Upper Lobe Diminished   Left Lower Lobe Diminished   Additional Respiratory  Assessments   Resp 21   Position Semi-Phelps's   Oral Care Mouth suctioned   Alarm Settings   High Pressure Alarm 45 cmH2O   Delay Alarm 20 sec(s)   Low Minute Volume Alarm 2.5 L/min   Apnea (secs) 20 secs   High Respiratory Rate 40 br/min   Low Exhaled Vt  250 mL   ETT (adult)   Placement Date/Time: 01/10/22 (c) 0014   Preoxygenation: Yes  Mask Ventilation: Ventilated by mask (1)  Technique: Video laryngoscopy  Tube Size: 8 mm  Blade Size: 3  Location: Oral  Grade View: Full view of the glottis  Insertion attempts: 2  Placeme. ..    Secured at 24 cm   Measured From [address] [place] [address],[address] By [employer] tube jain   Site Condition Dry

## 2022-01-22 NOTE — PROGRESS NOTES
01/22/22 0209   Vent Information   Equipment Changed HME   Vent Type 980   Vent Mode AC/PC   Vt Ordered 0 mL   Rate Set 20 bmp   Peak Flow 0 L/min   Pressure Support 0 cmH20   FiO2  80 %   SpO2 93 %   SpO2/FiO2 ratio 116.25   Sensitivity 3   PEEP/CPAP 15   I Time/ I Time % 0 s   Humidification Source HME   Nitric Oxide/Epoprostenol In Use? No   Vent Patient Data   High Peep/I Pressure 20   Peak Inspiratory Pressure 36 cmH2O   Mean Airway Pressure 22 cmH20   Rate Measured 23 br/min   Vt Exhaled 824 mL   Minute Volume 18.2 Liters   I:E Ratio 1:2.80   Plateau Pressure 32 [id number]   Static Compliance 50 mL/cmH2O   Spontaneous Breathing Trial (SBT) RT Doc   Pulse 92   Breath Sounds   Right Upper Lobe Diminished   Right Middle Lobe Diminished   Right Lower Lobe Diminished   Left Upper Lobe Diminished   Left Lower Lobe Diminished   Additional Respiratory  Assessments   Resp 24   Position Semi-Phelps's   Oral Care Completed? No   Alarm Settings   High Pressure Alarm 45 cmH2O   Delay Alarm 20 sec(s)   Low Minute Volume Alarm 2.5 L/min   Apnea (secs) 20 secs   High Respiratory Rate 40 br/min   Low Exhaled Vt  250 mL   Patient Observation   Observations 8.0   ETT (adult)   Placement Date/Time: 01/10/22 (c) 0010   Preoxygenation: Yes  Mask Ventilation: Ventilated by mask (1)  Technique: Video laryngoscopy  Tube Size: 8 mm  Blade Size: 3  Location: Oral  Grade View: Full view of the glottis  Insertion attempts: 2  Placeme. ..    Secured at 24 cm   Measured From [address] [place] [address],[address] By Commercial tube jain   Site Condition Dry

## 2022-01-22 NOTE — CONSULTS
Infectious Disease Consult Note  2022   Patient Name: Halle Mazariegos : 1965   Impression  Critical COVID-19 pneumonia with acute hypoxic respiratory failure. Day 29 of illness, in hyperinflammatory phase, possible bacterial pneumonia. ? MDRO   Vaccinated:no  Date of symptom onset:2022  Date of positive SARS-CoV-2 NAAT/PCR:2022  Exposure: unknown   Oxygen supplementation: MV  Bacterial infection: probable  BMI: 25.65 kg/m²  COVID-associated Coagulopathy  Elevated D-dimer   Multi-morbidity: per PMHx  Plan:   Therapeutic: d/c cetriaxone and azithromycin. Start vancomycin, meropenem, Solumedrol   Diagnostic: respiratory cx, blood cx, Aspergillus Ag, Beta-1,3-D-glucan, respiratory cx   F/u test:    D/w Ex-wife, informed her that ivermectin has not shown proven efficacy, and I disagreed with its use for COVID. Thank you for allowing me to consult in the care of this patient.  ------------------------  REASON FOR CONSULT: Infective syndrome   Requested by: Dr. Jayme Patel  History obtained from chart review, RN and family member as the patient is intubated  HPI:Patient is a 64 y.o.  male medical history hyperlipidemia, seizure disorder, status post craniotomy who was admitted 2022 for further evaluation and management of shortness of breath. Was diagnosed with COVID-19 2 weeks prior to admission. On arrival in the emergency department his oxygen saturation was 77% on ambient air. He required 6 L/min of oxygen. His initial chest x-ray showed patchy bilateral lung infiltrates with basilar predominance compatible with COVID pneumonia. He rapidly worsened and required BiPAP. He was intubated on 2022. Initially received dexamethasone 10 mg as well as single dose and then subsequently 6 mg daily through present.   Ceftriaxone and azithromycin was started on  presumably for leukocytosis, the day after his procalcitonin was 6.53 ng/mL, his procalcitonin did drop but slowly not following the predicted dropped by half every 2 days. Dexamethasone was stopped on 1/20.  ? Infectious diseases service was consulted to evaluate the pt, and recommend further investigative and therapeutic measures. Review and summary of old records:  ROS: Other systems reviewed Including eyes, ENT, respiratory, cardiovascular, GI, , dermatologic, neurologic, psych, hem/lymphatic, musculoskeletal and endocrine were negative other than what is mentioned above. Unable to obtain; pt on vent  Patient Active Problem List    Diagnosis Date Noted    Pneumonia due to COVID-19 virus 01/07/2022    Open wedge compression fracture of T12 vertebra with routine healing 12/16/2021    Positive FIT (fecal immunochemical test) 11/08/2021    HLD 10/21/2021    Chronic right shoulder pain 10/21/2021    Open wedge compression fracture of T11 vertebra with routine healing 05/14/2021    Moderate episode of recurrent major depressive disorder ([address] 01/22/2021    S/P placement of VNS (vagus nerve stimulation) device 01/21/2021    Stenosis of right carotid artery 01/21/2021    Rhinitis, allergic 03/23/2017    Symptomatic localization-related epilepsy ([address] 03/23/2017    History of craniotomy 04/21/2016    Malformation of cortical development of brain ([address] 04/21/2016     Past Medical History:   Diagnosis Date    HLD 10/21/2021    Moderate episode of recurrent major depressive disorder ([address]     Seizures ([address]       Past Surgical History:   Procedure Laterality Date    BRAIN SURGERY        History reviewed. No pertinent family history. Infectious disease related family history - not contibutory. SOCIAL HISTORY  Social History     Tobacco Use    Smoking status: Never Smoker    Smokeless tobacco: Never Used   Substance Use Topics    Alcohol use: No      ?  ALLERGIES  No Known Allergies   MEDICATIONS  Reviewed and are per the chart/EMR.    IMMUNIZATION HISTORY  Immunization History   Administered Date(s)

## 2022-01-23 NOTE — PROGRESS NOTES
Rn contacted Dr Nuala Bumpers and questioned attempting to prone patient, He agreed and requested we attempt and see how patient tolerates.

## 2022-01-23 NOTE — PROGRESS NOTES
Tube feeding residuals at 130 rate at 25ml/hr at this AM assessment. Tube feeding has been clamped since. Residuals remain 90ml at this time. Dr Elbert Powell rounded, updated. Asked that we hold tube feeding for now and retry this evening. RN agreed.

## 2022-01-23 NOTE — PROGRESS NOTES
Patient had large cuff leak. Per previous chest x-ray, it was mentioned to advance tube placement. ETT was now advanced by 4 cm and cuff reinflated. Patient tolerates well. Cuff lead resolves. RN at bedside. Anesthesia still requested for further evaluation.

## 2022-01-23 NOTE — PROGRESS NOTES
Pt had large cuff leak. Dr Tony Gay and RT aware of leak. Anesthesia has been notified multiple times to assess and possibly replace ETT. She states she is in an emergent case and has not been able to leave. Pt pulling volumes and o2 sat currently 94%. Will continue to monitor closely.

## 2022-01-23 NOTE — PROGRESS NOTES
RN spoke with patients daughter José Miguel Olivo and Viv Eileen explained that we attempted to prone patient last weekend and patient did not tolerate it at all, he actually decline further. José Miguel Olivo states she would talk to her siblings and her mother and discuss if they feel comfortable attempting again. RN agreed to notify Dr Joce Barraza and see if he agrees with moving forward. Dr Joce Barraza did request we move forward and see how patient tolerates. RN agreed. Will wait on family to discuss and ensure they agree with plan of care.

## 2022-01-23 NOTE — ANESTHESIA PROCEDURE NOTES
Airway  Date/Time: 1/23/2022 9:30 AM  Urgency: urgent      General Information and Staff    Patient location during procedure: ICU  Resident/CRNA: PATRICE Wahl CRNA  Performed: resident/CRNA     Consent for Airway (if performed for an anesthetic, see related documentation for consents)  Patient identity confirmed: per hospital policy      Indications and Patient Condition  Indications for airway management: respiratory failure  Breathing: On vent since 1/10/22. Sedation level: deep (propofol and fentanyl gtts in situ)  Preoxygenated: yes  Patient position: sniffing  MILS not maintained throughout      Final Airway Details  Final airway type: endotracheal airway      Successful airway: ETT  Cuffed: yes   Successful intubation technique: video laryngoscopy  Blade: Helen  Blade size: #4  ETT size (mm): 7.5 (ETT in situ)  Cormack-Lehane Classification: grade I - full view of glottis  Placement verified by: chest auscultation and capnometry   Measured from: lips (centered)  Number of attempts at approach: 1    Additional Comments  Called to evaluated ETT on a Vented and sedated patient r/t audible air heard with breaths delivered. Used glidescope. Noted cuff was above the cords and overinflated, and ETT was curved against wall. Deflated cuff, advanced ETT past balloon and reinflated with 10cc only. Initially ETT was at 27cm. Currently ETT is at 25cm, fastened in center of mouth with mouth guard. PCXR ordered.

## 2022-01-23 NOTE — PROGRESS NOTES
Patient continued to have occasional reoccurance of leak and ETT lip line was found to be out by 2-3 cm. Patient's ETT jain was replaced and prior to placement, skin and ETT wiped with alcohol. ETT issues seem to resolve after placement of ETT jain. BS remain equal, VT and sats remain good.

## 2022-01-23 NOTE — PROGRESS NOTES
Patients tube adjusted via by Kevan Layton CRNA. Juliana Simon RT, Time Hinkle, and myself at bedside. Patient is pulling good volumes and o2 saturations 91% at this time. STAT chest xray ordered and xray called requesting they come to bedside as soon as possible. ,

## 2022-01-23 NOTE — PROGRESS NOTES
Hospitalist Progress Note       1/23/2022 9:57 AM  Admit Date: 1/7/2022    PCP: Brent Marina MD     Assessment and Plan:   1. Acute hypoxic respiratory failure: This is due to COVID-19 pneumonia. CTA-no PE. Patient was intubated on 1/9. Continue vent management. Pulmonary consult appreciated. 2.  Severe COVID-19 pneumonia: Status post dexamethasone. Baricitinib discontinued because of worsening LFT. Procalcitonin is elevated-status post Rocephin/Zithromax. Currently on IV vancomycin and meropenem for secondary bacterial pneumonia. ID input acknowledged. 3.  Hypotension: Echo-EF 50 to 55%. On midodrine. Status post albumin. Levophed drip successfully weaned off.    4.  Hypernatremia/hypokalemia: Due to dehydration. Resolved. Monitor BMP. 5.  Transaminitis: Due to COVID-19 pneumonia. Discontinued Lipitor and baricitinib. Monitor LFT. 6.  Acute on chronic anemia: FOBT is pending. On Protonix and Pepcid. H&H is currently stable. Transfuse if hemoglobin is less than 7.    7.  Hypocalcemia: Mostly due to hypoalbuminemia. Given calcium gluconate. Normal ionized calcium level. Chronic problems include history of craniotomy, seizure disorder and depression. DVT prophylaxis: Lovenox 80 mg twice daily  Disposition: To be determined.     Patient Active Problem List:     History of craniotomy     Malformation of cortical development of brain ([address]     Open wedge compression fracture of T11 vertebra with routine healing     Moderate episode of recurrent major depressive disorder (HCC)     Rhinitis, allergic     S/P placement of VNS (vagus nerve stimulation) device     Stenosis of right carotid artery     Symptomatic localization-related epilepsy (HCC)     HLD     Chronic right shoulder pain     Positive FIT (fecal immunochemical test)     Open wedge compression fracture of T12 vertebra with routine healing     Pneumonia due to COVID-19 virus      Subjective:     Chief Complaint   Patient presents with    Shortness of Breath     on 6L NC    Positive For Covid-19       F/U:  Interval History: Discussed with his nurse. ET tube was 8 cm above the nicol-pushed in this morning. Repeat chest x-ray is pending. Objective: Intake/Output Summary (Last 24 hours) at 1/23/2022 0957  Last data filed at 1/23/2022 0634  Gross per 24 hour   Intake 4400.75 ml   Output 950 ml   Net 3450.75 ml      Vitals:   Vitals:    01/23/22 0900   BP: 99/69   Pulse: 77   Resp: 18   Temp:    SpO2: 92%     Physical Exam:  General: Orally intubated. OG tube feeding. Neurologic: Sedated. Eyes: Not pale. Anicteric. Neck: No neck mass or thyromegaly  Cardiovascular: Regular. No murmur S3  Respiratory: Good air entry bilaterally. No wheezes or crepitation. Abdomen: Soft. No palpable mass. : Mcintyre catheter noted. Extremities: Mild edema of upper and lower extremities. The feet are warm.     Electronically signed by Archana Centeno MD on 1/23/2022 at 9:57 AM  [facility] Hospitalist

## 2022-01-23 NOTE — PROGRESS NOTES
01/23/22 1140   Vent Information   Equipment Changed HME   Vent Type 980   Vent Mode AC/PC   Vt Ordered 0 mL   Pressure Ordered 20   Rate Set 20 bmp   Peak Flow 0 L/min   Pressure Support 0 cmH20   FiO2  75 %   SpO2 94 %   SpO2/FiO2 ratio 125.33   Sensitivity 3   PEEP/CPAP 15   I Time/ I Time % 0 s   Humidification Source HME   Nitric Oxide/Epoprostenol In Use? No   Vent Patient Data   High Peep/I Pressure 20   Peak Inspiratory Pressure 37 cmH2O   Mean Airway Pressure 22 cmH20   Rate Measured 22 br/min   Vt Exhaled 827 mL   Minute Volume 15 Liters   I:E Ratio 1:2.80   Spontaneous Breathing Trial (SBT) RT Doc   Pulse 79   Breath Sounds   Right Upper Lobe Diminished   Right Middle Lobe Diminished   Right Lower Lobe Diminished   Left Upper Lobe Diminished   Left Lower Lobe Diminished   Additional Respiratory  Assessments   Resp 20   Position Semi-Phelps's   Alarm Settings   High Pressure Alarm 45 cmH2O   Delay Alarm 20 sec(s)   Low Minute Volume Alarm 2.5 L/min   Apnea (secs) 20 secs   High Respiratory Rate 45 br/min   Low Exhaled Vt  250 mL   ETT (adult)   Placement Date/Time: 01/23/22 (c 0930   Preoxygenation: Yes  Technique: Video laryngoscopy  Type: Cuffed  Tube Size: (c) 8 mm  Laryngoscope: Mac  Blade Size: 4  Location: Oral  Grade View: Full view of the glottis  Insertion attempts: 1  Placement Ve. ..    Secured at 25 cm   Measured From [address] [place] [address],[address] By [employer] tube jain   Site Condition Dry

## 2022-01-23 NOTE — PROGRESS NOTES
Patient starting to desaturate, 86-87% consistently. Called miles TAYLOR, she requested I increase Fi02 to 85% RN agreed. o2 sats improved to 90% for a min and slowly started trending back down to 86%, Luly Pablo called again to notify, requested I increase Fi02 to 100%, RN agreed. Dr Kendell Mack notified and daughter Rigoberto Teran updated.

## 2022-01-23 NOTE — PROGRESS NOTES
01/23/22 1517   Vent Information   Vent Type 980   Vent Mode AC/PC   Vt Ordered 0 mL   Pressure Ordered 20   Rate Set 20 bmp   Peak Flow 0 L/min   Pressure Support 0 cmH20   FiO2  100 %   SpO2 91 %   SpO2/FiO2 ratio 91   Sensitivity 3   PEEP/CPAP 15   I Time/ I Time % 0 s   Humidification Source HME   Vent Patient Data   High Peep/I Pressure 20   Peak Inspiratory Pressure 36 cmH2O   Mean Airway Pressure 21 cmH20   Rate Measured 21 br/min   Vt Exhaled 712 mL   Minute Volume 14.8 Liters   I:E Ratio 1:2.80   Cough/Sputum   Sputum How Obtained Endotracheal;Suctioned   $Obtained Sample $Induced Sputum   Cough Non-productive   Frequency Infrequent   Sputum Amount None   Sputum Color None   Tenacity None   Spontaneous Breathing Trial (SBT) RT Doc   Pulse 72   Breath Sounds   Right Upper Lobe Diminished   Right Middle Lobe Diminished   Right Lower Lobe Diminished   Left Upper Lobe Diminished   Left Lower Lobe Diminished   Additional Respiratory  Assessments   Resp 19   Position Semi-Phelps's   Alarm Settings   High Pressure Alarm 45 cmH2O   Delay Alarm 20 sec(s)   Low Minute Volume Alarm 2.5 L/min   Apnea (secs) 20 secs   High Respiratory Rate 45 br/min   Low Exhaled Vt  250 mL   ETT (adult)   Placement Date/Time: 01/23/22 (c 0930   Preoxygenation: Yes  Technique: Video laryngoscopy  Type: Cuffed  Tube Size: (c) 8 mm  Laryngoscope: Mac  Blade Size: 4  Location: Oral  Grade View: Full view of the glottis  Insertion attempts: 1  Placement Ve. ..    Secured at 25 cm   Measured From [address] [place] [address],[address] By Commercial tube jain   Site Condition Dry

## 2022-01-23 NOTE — PROGRESS NOTES
[facility]  consulted by Dr. Livan Brewer for monitoring and adjustment. Indication for treatment: Pneumonia (HAP)  Goal trough: 15 mcg/mL  AUC/MANSOOR: 400-600    Pertinent Laboratory Values:   Temp Readings from Last 3 Encounters:   01/23/22 98.2 °F (36.8 °C) (Rectal)   01/06/22 98.4 °F (36.9 °C) (Oral)   12/16/21 97.2 °F (36.2 °C) (Infrared)     Recent Labs     01/21/22  0600 01/22/22  0530 01/23/22  0530   WBC 23.5* 16.5* 19.1*     Recent Labs     01/21/22  0931 01/22/22  0530 01/23/22  0530   BUN 57* 45* 39*   CREATININE 1.0 0.8* 0.8*     Estimated Creatinine Clearance: 113 mL/min (A) (based on SCr of 0.8 mg/dL (L)). Intake/Output Summary (Last 24 hours) at 1/23/2022 0945  Last data filed at 1/23/2022 0634  Gross per 24 hour   Intake 4400.75 ml   Output 950 ml   Net 3450.75 ml       Pertinent Cultures:  Date    Source    Results  1/22   MRSA nasal screen  Pending      Vancomycin level:   TROUGH:    Recent Labs     01/23/22  0530   VANCOTROUGH 14.3     RANDOM:  No results for input(s): VANCORANDOM in the last 72 hours. Assessment:  · WBC and temperature: elevated WBC/febrile  · SCr, BUN, and urine output:   · Elevated BUN, low UOP. · SCr stable @ 0.8  · Day(s) of therapy:1  · Vancomycin concentration:  · 1/23:  14.3, 4h post dose    Plan:  · Started on Vancomycin 1250 mg IVPB q12h  · Random level therapeutic @ 14.3  · Predicted AUC: 563 at steady-state  · Repeat level in 2 days  · Pharmacy will continue to monitor patient and adjust therapy as indicated    [address] 01/25/22 @0600    Thank you for the consult.   Dhruv Moya, [facility]  1/23/2022 9:45 AM

## 2022-01-23 NOTE — PROGRESS NOTES
Daughter and ex wife at bedside,  Both children agree to prone patient. RN notified Dr Kendell Mack, order received to modify propfol infusion to titrate to a RASS of -3/-4 while prone. RN agreed. Answered all questions. RT stated she is available around 1700, family aware and agreeable.

## 2022-01-23 NOTE — PROGRESS NOTES
pulmonary      SUBJECTIVE:  Intubated on vent     OBJECTIVE    VITALS:  /70   Pulse 79   Temp 98.2 °F (36.8 °C) (Rectal)   Resp 20   Ht 6' (1.829 m)   Wt 189 lb 2.5 oz (85.8 kg)   SpO2 94%   BMI 25.65 kg/m²   HEAD AND FACE EXAM:  No throat injection, no active exudate,no thrush  NECK EXAM;No JVD, no masses, symmetrical  CHEST EXAM; Expansion equal and symmetrical, no masses  LUNG EXAM; Good breath sounds bilaterally.  There are expiratory wheezes both lungs, there are crackles at both lung bases  CARDIOVASCULAR EXAM: Positive S1 and S2, no S3 or S4, no clicks ,no murmurs  RIGHT AND LEFT LOWER EXTRIMITY EXAM: No edema, no swelling, no inflamation            LABS   Lab Results   Component Value Date    WBC 19.1 (H) 01/23/2022    HGB 8.5 (L) 01/23/2022    HCT 27.8 (L) 01/23/2022    .1 (H) 01/23/2022     01/23/2022     Lab Results   Component Value Date    CREATININE 0.8 (L) 01/23/2022    BUN 39 (H) 01/23/2022     (H) 01/23/2022    K 4.2 01/23/2022     (H) 01/23/2022    CO2 23 01/23/2022     No results found for: INR, PROTIME     No results found for: PHOS     Recent Labs     01/21/22  0600 01/22/22  0600 01/23/22  0600   PH 7.37 7.34 7.36   PO2ART 50* 59* 58*   MXC4LYL 40.0 44.0 39.0   O2SAT 84.7* 88.1* 89.4*         Wt Readings from Last 3 Encounters:   01/17/22 189 lb 2.5 oz (85.8 kg)   01/06/22 185 lb (83.9 kg)   12/16/21 186 lb 9.6 oz (84.6 kg)        EXAMINATION:   ONE XRAY VIEW OF THE CHEST       1/23/2022 9:24 am       COMPARISON:   1/22/2022       HISTORY:   ORDERING SYSTEM PROVIDED HISTORY: ETT placement   TECHNOLOGIST PROVIDED HISTORY:   Reason for exam:->ETT placement   Reason for Exam: ETT placement       FINDINGS:   The cardiomediastinal silhouette is mildly enlarged.  Endotracheal tube   projects 6.4 cm above the nicol.  Enteric tube is curled below the left   hemidiaphragm.  Patchy interstitial and alveolar opacities bilaterally.  Left   central venous catheter projects over the SVC.           Impression   Unchanged exam with consolidations in the mid to lower lung fields concerning   for multifocal pneumonia.       Endotracheal tube projects 6.4 cm above the nicol.  Consider advancement   approximately 1 cm for optimal placement. ASSESMENT  Ac resp failure  covid pneumonia  Ac bronchospasm        PLAN  1. cpm  2. Cont full vent support  3. Not weanable at present  4.  Tube feed on hold sec to high residual    1/23/2022  Gretchen Reed MD, [facility].

## 2022-01-23 NOTE — PROGRESS NOTES
01/23/22 0721   Vent Information   $Ventilation $Subsequent Day   Vent Type 980   Vent Mode AC/PC   Vt Ordered 0 mL   Pressure Ordered 20   Rate Set 20 bmp   Peak Flow 0 L/min   Pressure Support 0 cmH20   FiO2  75 %   SpO2 93 %   SpO2/FiO2 ratio 124   Sensitivity 3   PEEP/CPAP 15   I Time/ I Time % 0 s   Humidification Source HME   Nitric Oxide/Epoprostenol In Use? No   Vent Patient Data   High Peep/I Pressure 20   Peak Inspiratory Pressure 36 cmH2O   Mean Airway Pressure 21 cmH20   Rate Measured 20 br/min   Vt Exhaled 702 mL   Minute Volume 14.4 Liters   I:E Ratio 1:2.80   Plateau Pressure 33 [id number]   Static Compliance 26 mL/cmH2O   Total PEEP 15 cmH20   Auto PEEP 0 cmH20   Cough/Sputum   Sputum How Obtained Endotracheal;Suctioned   $Obtained Sample $Induced Sputum   Cough Non-productive   Frequency Infrequent   Sputum Amount None   Sputum Color None   Tenacity None   Spontaneous Breathing Trial (SBT) RT Doc   Pulse 83   Breath Sounds   Right Upper Lobe Diminished   Right Middle Lobe Diminished   Right Lower Lobe Diminished   Left Upper Lobe Diminished   Left Lower Lobe Diminished   Additional Respiratory  Assessments   Resp 23   Position Semi-Phelps's   Oral Care Mouth suctioned   Alarm Settings   High Pressure Alarm 45 cmH2O   Delay Alarm 20 sec(s)   Low Minute Volume Alarm 2.5 L/min   Apnea (secs) 20 secs   High Respiratory Rate 45 br/min   Low Exhaled Vt  250 mL   ETT (adult)   Placement Date/Time: 01/10/22 (c) 0014   Preoxygenation: Yes  Mask Ventilation: Ventilated by mask (1)  Technique: Video laryngoscopy  Tube Size: 8 mm  Blade Size: 3  Location: Oral  Grade View: Full view of the glottis  Insertion attempts: 2  Placeme. ..    Secured at 26 cm   Measured From [address] [place] [address],[address] By [employer] tube jain   Site Condition Dry

## 2022-01-23 NOTE — PROGRESS NOTES
Nikko Junior daughter, requested a call once the tube was readjusted, RN agreed. RN called daughter to update.  Answered all questions

## 2022-01-23 NOTE — PROGRESS NOTES
[address] [facility]  consulted by Dr. Hilton Moore for monitoring and adjustment. Indication for treatment: Pneumonia (HAP)  Goal trough: 15 mcg/mL  AUC/MANSOOR: 400-600    Pertinent Laboratory Values:   Temp Readings from Last 3 Encounters:   01/22/22 100.9 °F (38.3 °C) (Rectal)   01/06/22 98.4 °F (36.9 °C) (Oral)   12/16/21 97.2 °F (36.2 °C) (Infrared)     Recent Labs     01/21/22  0600 01/22/22  0530   WBC 23.5* 16.5*     Recent Labs     01/21/22  0931 01/22/22  0530   BUN 57* 45*   CREATININE 1.0 0.8*     Estimated Creatinine Clearance: 113 mL/min (A) (based on SCr of 0.8 mg/dL (L)). Intake/Output Summary (Last 24 hours) at 1/22/2022 1943  Last data filed at 1/22/2022 1830  Gross per 24 hour   Intake 4142. 35 ml   Output 1800 ml   Net 2342.35 ml       Pertinent Cultures:  Date    Source    Results  1/22   MRSA nasal screen  Ordered      Vancomycin level:   TROUGH:  No results for input(s): VANCOTROUGH in the last 72 hours. RANDOM:  No results for input(s): VANCORANDOM in the last 72 hours. Assessment:  WBC and temperature: elevated WBC/febrile  SCr, BUN, and urine output: Elevated BUN, low UOP. Day(s) of therapy:1  Vancomycin concentration: Pending    Plan:  Start vancomycin 1250 mg IVPB q12h  Predicted trough of 16.2 and AUC: 527 at steady-state  Pharmacy will continue to monitor patient and adjust therapy as indicated    Brianna 3 01/23/22 @0600    Thank you for the consult.   Timraghu Jackson, [address] [place] [address]  1/22/2022 7:43 PM

## 2022-01-24 PROBLEM — J96.01 ACUTE RESPIRATORY FAILURE WITH HYPOXIA (HCC): Status: ACTIVE | Noted: 2022-01-01

## 2022-01-24 NOTE — PROGRESS NOTES
Infectious Disease Progress Note  2022   Patient Name: Lewie Hashimoto : 1965       Reason for visit: F/u COVID-19 pneumonia, acute respiratory failure? History:? Interval history noted  Intubated, sedated and on mechanical ventilation  Physical Exam:  Vital Signs: /77   Pulse 79   Temp 98.1 °F (36.7 °C) (Axillary)   Resp 21   Ht 6' (1.829 m)   Wt 189 lb 2.5 oz (85.8 kg)   SpO2 95%   BMI 25.65 kg/m²     Gen: intubated and sedated  Skin: no stigmata of endocarditis  Wounds: C/D/I  HEMT: AT/NC ETT OGT  Eyes: PERRLA. Neck: Supple. Trachea midline. No LAD. Chest:  transmitted breath sounds. Heart: RRR  Abd: soft, non-distended, no tenderness, no hepatomegaly. Normoactive bowel sounds. Ext: no clubbing, cyanosis, or edema  Catheter Site: without erythema or tenderness  LDA: left arm PICC , Urethral catheter;  Neuro: sedated and on the ventilator. Radiologic / Imaging / TESTING  ONE XRAY VIEW OF THE CHEST     2022 6:50 am     COMPARISON:   2022     HISTORY:   ORDERING SYSTEM PROVIDED HISTORY: intubated patient   TECHNOLOGIST PROVIDED HISTORY:   Reason for exam:->intubated patient   Reason for Exam: VENT     Follow-up exam     FINDINGS:   Endotracheal tube tip is 7.1 cm above the nicol. Multifocal airspace   opacities are not significantly changed. Lines and tubes are otherwise   stable in positioning. No pleural effusion. Stable cardiomediastinal   silhouette. No pneumothorax. Osseous structures otherwise stable. Impression:   Endotracheal tube tip is 7.1 cm above the nicol.   Otherwise no significant   interval change in bilateral pneumonia        Labs:    Recent Results (from the past 24 hour(s))   POCT Glucose    Collection Time: 22 12:47 AM   Result Value Ref Range    POC Glucose 165 (H) 70 - 99 MG/DL   Procalcitonin    Collection Time: 22  5:46 AM   Result Value Ref Range    Procalcitonin 0.03    Basic metabolic panel    Collection Time: 22 5:46 AM   Result Value Ref Range    Sodium 149 (H) 135 - 145 MMOL/L    Potassium 4.3 3.5 - 5.1 MMOL/L    Chloride 117 (H) 99 - 110 mMol/L    CO2 23 21 - 32 MMOL/L    Anion Gap 9 4 - 16    BUN 43 (H) 6 - 23 MG/DL    CREATININE 0.9 0.9 - 1.3 MG/DL    Glucose 153 (H) 70 - 99 MG/DL    Calcium 7.9 (L) 8.3 - 10.6 MG/DL    GFR Non-African American >60 >60 mL/min/1.73m2    GFR African American >60 >60 mL/min/1.73m2   C-Reactive Protein    Collection Time: 01/24/22  5:46 AM   Result Value Ref Range    CRP, High Sensitivity 290.6 mg/L   Blood gas, arterial    Collection Time: 01/24/22  6:00 AM   Result Value Ref Range    pH, Bld 7.26 (L) 7.34 - 7.45    pCO2, Arterial 52.0 (H) 32 - 45 MMHG    pO2, Arterial 73 (L) 75 - 100 MMHG    Base Excess 4 (H) 0 - 3.3    HCO3, Arterial 23.3 (H) 18 - 23 MMOL/L    CO2 Content 24.9 (H) 19 - 24 MMOL/L    O2 Sat 92.5 (L) 96 - 97 %    Carbon Monoxide, Blood 2.1 0 - 5 %    Methemoglobin, Arterial 1.3 <1.5 %    Comment ACPC RR 20, PI 20, 100%, +15      CULTURE results: Invalid input(s): BLOOD CULTURE,  URINE CULTURE, SURGICAL CULTURE    Diagnosis:  Patient Active Problem List   Diagnosis    History of craniotomy    Malformation of cortical development of brain ([facility] [address]    Open wedge compression fracture of T11 vertebra with routine healing    Moderate episode of recurrent major depressive disorder (HCC)    Rhinitis, allergic    S/P placement of VNS (vagus nerve stimulation) device    Stenosis of right carotid artery    Symptomatic localization-related epilepsy (HCC)    HLD    Chronic right shoulder pain    Positive FIT (fecal immunochemical test)    Open wedge compression fracture of T12 vertebra with routine healing    Pneumonia due to COVID-19 virus    Acute respiratory failure with hypoxia (HCC)       Active Problems  Active Problems:    Pneumonia due to COVID-19 virus    Acute respiratory failure with hypoxia (HCC)  Resolved Problems:    * No resolved hospital problems. *      Impression and plan   Summary and rationale: Patient is a 64 y.o.  male with a medical hx of HLD, seizure disorder, s/p craniotomy admitted for severe COVID-19 pneumonia, intubated on 1/9/2022. He has critical COVID-19 pneumonia with hyper inflammation. Respiratory culture positive for Enterobacter cloacae   COVID-19 symptom onset: 12/24/2021   COVID-19 test date: 1/27/2022   Expected discontinuation of isolation precautions: 1/13/2022   Clinical status: Slight improvement.    Therapeutic:  o Ongoing antibiotics: Vancomycin, meropenem  o Immunomodulators:   - Solu-Medrol: Pulse dosing  o Completed antibiotics: Ceftriaxone, azithromycin  o Other agents:    Diagnostic: Trend CRP   F/u:   Other:      Electronically signed by: Electronically signed by Theresa Caraballo MD on 1/24/2022 at 6:09 PM

## 2022-01-24 NOTE — PROGRESS NOTES
pulmonary      SUBJECTIVE: intubated on vent     OBJECTIVE    VITALS:  /64   Pulse 93   Temp 98.2 °F (36.8 °C) (Rectal)   Resp 19   Ht 6' (1.829 m)   Wt 189 lb 2.5 oz (85.8 kg)   SpO2 94%   BMI 25.65 kg/m²   HEAD AND FACE EXAM:  No throat injection, no active exudate,no thrush  NECK EXAM;No JVD, no masses, symmetrical  CHEST EXAM; Expansion equal and symmetrical, no masses  LUNG EXAM; Good breath sounds bilaterally. There are expiratory wheezes both lungs, there are crackles at both lung bases  CARDIOVASCULAR EXAM: Positive S1 and S2, no S3 or S4, no clicks ,no murmurs  RIGHT AND LEFT LOWER EXTRIMITY EXAM: No edema, no swelling, no inflamation            LABS   Lab Results   Component Value Date    WBC 19.1 (H) 01/23/2022    HGB 8.5 (L) 01/23/2022    HCT 27.8 (L) 01/23/2022    .1 (H) 01/23/2022     01/23/2022     Lab Results   Component Value Date    CREATININE 0.8 (L) 01/23/2022    BUN 39 (H) 01/23/2022     (H) 01/23/2022    K 4.2 01/23/2022     (H) 01/23/2022    CO2 23 01/23/2022     No results found for: INR, PROTIME     No results found for: PHOS     Recent Labs     01/22/22  0600 01/23/22  0600 01/24/22  0600   PH 7.34 7.36 7.26*   PO2ART 59* 58* 73*   MMO1BRN 44.0 39.0 52.0*   O2SAT 88.1* 89.4* 92.5*         Wt Readings from Last 3 Encounters:   01/17/22 189 lb 2.5 oz (85.8 kg)   01/06/22 185 lb (83.9 kg)   12/16/21 186 lb 9.6 oz (84.6 kg)          EXAMINATION:   ONE XRAY VIEW OF THE CHEST       1/24/2022 6:50 am       COMPARISON:   1/23/2022       HISTORY:   ORDERING SYSTEM PROVIDED HISTORY: intubated patient   TECHNOLOGIST PROVIDED HISTORY:   Reason for exam:->intubated patient   Reason for Exam: VENT       Follow-up exam       FINDINGS:   Endotracheal tube tip is 7.1 cm above the nicol.  Multifocal airspace   opacities are not significantly changed.  Lines and tubes are otherwise   stable in positioning.  No pleural effusion.  Stable cardiomediastinal   silhouette.  No pneumothorax.  Osseous structures otherwise stable.           Impression   Endotracheal tube tip is 7.1 cm above the nicol.  Otherwise no significant   interval change in bilateral pneumonia       The findings were sent to the Radiology Results [address] at 7:43   am on 1/24/2022to be communicated to a licensed caregiver.                 ASSESMENT  Ac resp failure  covid pneumonia  abct pneumonia        PLAN  1. Cont present rx  2. Cont full vent support  3. He remains on high vent settings so not weanable at present  4.  Tube feed  5. cxr pending    1/24/2022  Mariely Ramos MD, M.D.

## 2022-01-24 NOTE — PROGRESS NOTES
[facility]  consulted by Dr. Elina Montes for monitoring and adjustment. Indication for treatment: Pneumonia (HAP)  Goal trough: 15 mcg/mL  AUC/MANSOOR: 400-600    Pertinent Laboratory Values:   Temp Readings from Last 3 Encounters:   01/24/22 98.1 °F (36.7 °C) (Axillary)   01/06/22 98.4 °F (36.9 °C) (Oral)   12/16/21 97.2 °F (36.2 °C) (Infrared)     Recent Labs     01/22/22  0530 01/23/22  0530   WBC 16.5* 19.1*     Recent Labs     01/22/22  0530 01/23/22  0530 01/24/22  0546   BUN 45* 39* 43*   CREATININE 0.8* 0.8* 0.9     Estimated Creatinine Clearance: 101 mL/min (based on SCr of 0.9 mg/dL). Intake/Output Summary (Last 24 hours) at 1/24/2022 1654  Last data filed at 1/24/2022 0916  Gross per 24 hour   Intake 1729.21 ml   Output 1225 ml   Net 504.21 ml       Pertinent Cultures:  Date    Source    Results  1/22   MRSA nasal screen  Pending  1/23   Respiratory   Enterobacter      Vancomycin level:   TROUGH:    Recent Labs     01/23/22  0530   VANCOTROUGH 14.3     RANDOM:  No results for input(s): VANCORANDOM in the last 72 hours. Assessment:  · WBC and temperature: elevated WBC/afebrile  · SCr, BUN, and urine output:   · Elevated BUN, low UOP  · SCr stable @ 0.9  · Day(s) of therapy: 2  · Vancomycin concentration:  · 1/23:  14.3, 4h post dose    Plan:  · Continue vancomycin 1250 mg IVPB q12h  · Random level therapeutic @ 14.3  · Predicted AUC: 563 at steady-state  · Repeat level in 24h  · Pharmacy will continue to monitor patient and adjust therapy as indicated    Brianna 3 01/25/22 @0600    Thank you for the consult.   General Beckman, [address] [place] [address]  1/24/2022 4:54 PM

## 2022-01-24 NOTE — PROGRESS NOTES
Hospitalist Progress Note      Name:  Shantal Upton /Age/Sex: 1965  (64 y.o. male)   MRN & CSN:  [id number] & [id number] Admission Date/Time: 2022  9:37 AM   Location:  -A PCP: Lenny Bowen MD         Hospital Day: 18      Assessment and Plan:   Patient is 54-year-old male who was admitted for acute hypoxic kirill failure now intubated. #.  Acute hypoxic respiratory failure: Currently intubated  #. Viral pneumonia due to COVID-19  #. Suspected superimposed bacterial pneumonia  #. Shock likely recommendation COVID-19 and suspected superimposed bacterial pneumonia: Weaned off of Levophed  #. Transaminitis secondary to COVID-19 (Lipitor and baricitinib discontinued  #. Acute on chronic anemia  #. Other medical problems include seizure disorder, depression, history of craniotomy    Plan:  Patient remains intubated, sedated  Continue bronchodilators and wean off oxygen as tolerated  Continue methylprednisolone per ID recommendations  Continue meropenem and vancomycin per ID recommendations  Continue Lamictal for history of seizure disorder  Appreciate pulmonary and ID recommendations    Patient overall prognosis remains poor due to severe COVID-19 pneumonia. DVT prophylaxis: Lovenox  CODE STATUS: Total support      Subjective. :     Patient was seen and examined today. Patient remains intubated and mechanically ventilated. Objective:   Vitals:   Vitals:    22 1116   BP:    Pulse:    Resp: 25   Temp:    SpO2: 95%         Physical Exam:   GEN: Intubated, sedated  NECK: Supple, no apparent thyromegaly or masses. RESP: Clear lungs to auscultation  CARDIO/VASC: Regular rate and rhythm, normal S1, S2, no murmurs  GI: Abdomen is soft, nontender, no significant organomegaly noted, bowel sounds present  MSK: No gross joint deformities.   Skin: No significant rashes, skin lesions noted  Neuro: Intubated, mechanically ventilated, sedated  Medications:   Medications:    methylPREDNISolone  1,000 mg IntraVENous Daily    pantoprazole  40 mg IntraVENous Daily    meropenem  1,000 mg IntraVENous Q8H    vancomycin  1,250 mg IntraVENous Q12H    midodrine  10 mg Oral TID WC    cloBAZam  20 mg Oral BID    enoxaparin  1 mg/kg SubCUTAneous BID    lidocaine PF  5 mL IntraDERmal Once    sodium chloride flush  5-40 mL IntraVENous 2 times per day    chlorhexidine  15 mL Mouth/Throat BID    famotidine (PEPCID) injection  20 mg IntraVENous BID    albuterol sulfate HFA  2 puff Inhalation Q4H WA    ipratropium  2 puff Inhalation 4x daily    lamoTRIgine  150 mg Oral BID    topiramate  200 mg Oral BID    [Held by provider] atorvastatin  80 mg Oral Nightly    sertraline  25 mg Oral Daily    ascorbic acid  500 mg Oral BID    guaiFENesin  600 mg Oral BID    sodium chloride flush  5-40 mL IntraVENous 2 times per day    Vitamin D  2,000 Units Oral Daily    influenza virus vaccine  0.5 mL IntraMUSCular Prior to discharge      Infusions:    sodium chloride 50 mL/hr at 01/23/22 1849    norepinephrine Stopped (01/19/22 1706)    sodium chloride Stopped (01/15/22 0125)    propofol 43.703 mcg/kg/min (01/24/22 0928)    fentaNYL 62.5 mcg/hr (01/23/22 2244)    sodium chloride       PRN Meds: potassium chloride, 40 mEq, PRN   Or  potassium alternative oral replacement, 40 mEq, PRN   Or  potassium chloride, 10 mEq, PRN  sodium chloride flush, 5-40 mL, PRN  sodium chloride, 25 mL, PRN  LORazepam, 1 mg, Q4H PRN  clonazePAM, 0.5 mg, BID PRN  bisacodyl, 10 mg, Daily PRN  sodium chloride flush, 5-40 mL, PRN  sodium chloride, 25 mL, PRN  ondansetron, 4 mg, Q8H PRN   Or  ondansetron, 4 mg, Q6H PRN  polyethylene glycol, 17 g, Daily PRN  acetaminophen, 650 mg, Q6H PRN   Or  acetaminophen, 650 mg, Q6H PRN          Electronically signed by Bruce Jeffries MD on 1/24/2022 at 11:22 AM

## 2022-01-24 NOTE — DISCHARGE SUMMARY
Outpatient Physical Therapy  [place]           [x] Phone: [phone]   Fax: [phone]  [facility]           [] Phone: [phone]   Fax: [phone]        Physical Therapy Daily Discharge Note  Date:  2022    Patient Name:  Rosa Gibson    :  1965  MRN: [id number]    Restrictions/Precautions:    Diagnosis:    Chronic R shoulder pain  Date of Injury/Surgery:   Treatment Diagnosis:  R shoulder weakness, decreased ROM of R shoulder, R shoulder pain    Insurance/Certification information: [facility] -   Referring Physician:   Dr. Jazmyne Encinas   Next Doctor Visit:    Plan of care signed (Y/N):  yes  Outcome Measure: Quick DASH: 29.54%  Visit# / total visits: 10/12  Pain level:      0/10 currently  Goals:         Short term goals  Time Frame for Short term goals: 6 visits  Short term goal 1: Pt will be independent with HEP to maximize rehab potential outside of clinic MET    Short term goal 2: Pt will improve R shoulder abd AROM to 110 degrees to aide in ability to perform ADLs MET    Long term goals  Time Frame for Long term goals : 12 visits  Long term goal 1: Pt will improve quick DASH score to a 33% or lower to show subjective patient improvement in function. MET    Long term goal 2: Pt will improve global RUE strength to at least a 4+/5 to aide in ability to perform ADLs and overhead activities Progressing    Long term goal 3: Pt will improve R shoulder abd AROM to 150 degrees or more to aide in ability to perform overhead activities and reaching tasks. MET       Summary of Evaluation: Assessment: Pt is a 65 yo male that presents to outpatient physical therapy with R shoulder pain, ROM deficits, and weakness. Positive Special Tests include Speeds test and Carrasco-Peewee. Tenderness to palpation was noted over the proximal biceps tendon and over the supraspinatus muscle belly. 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  Pt will benefit from skilled physical therapy to improve above listed impairments, aide in ADLs, and to progress toward goal completion.          Objective:    Unable to complete an assessment of the patient and their progress towards their goals secondary to discontinuation of therapy. Curt Odonnell last appointment was on 12/20/22. Communication with other providers:    Faxed Discharge note secondary to discontinuation of therapy festus      Assessment:    Curt Odonnell has discontinued therapy services and at this time they will be discharged from our facility. If their is any future needs please don't hesitate to call our offices and resubmit a new therapy order. We appreciate your referral and letting us serve your patients.        Interventions PRN:  [x] Therapeutic Exercise  [x] Modalities:  [x] Therapeutic Activity     [] Ultrasound  [] Estim  [] Gait Training      [] Cervical Traction [] Lumbar Traction  [x] Neuromuscular Re-education    [] Cold/hotpack [] Iontophoresis   [x] Instruction in HEP      [x] Vasopneumatic   [] Dry Needling    [x] Manual Therapy               [] Aquatic Therapy              Electronically signed by:    Adam Cardona PT,DPT   ,  1/24/2022, 1:43 PM

## 2022-01-24 NOTE — PLAN OF CARE
Problem: Falls - Risk of:  Goal: Will remain free from falls  Description: Will remain free from falls  Outcome: Ongoing  Goal: Absence of physical injury  Description: Absence of physical injury  Outcome: Ongoing     Problem: Skin Integrity:  Goal: Will show no infection signs and symptoms  Description: Will show no infection signs and symptoms  Outcome: Ongoing  Goal: Absence of new skin breakdown  Description: Absence of new skin breakdown  Outcome: Ongoing     Problem: Airway Clearance - Ineffective  Goal: Achieve or maintain patent airway  Outcome: Ongoing     Problem: Gas Exchange - Impaired  Goal: Absence of hypoxia  Outcome: Ongoing  Goal: Promote optimal lung function  Outcome: Ongoing     Problem: Breathing Pattern - Ineffective  Goal: Ability to achieve and maintain a regular respiratory rate  Outcome: Ongoing     Problem:  Body Temperature -  Risk of, Imbalanced  Goal: Ability to maintain a body temperature within defined limits  Outcome: Ongoing  Goal: Will regain or maintain usual level of consciousness  Outcome: Ongoing  Goal: Complications related to the disease process, condition or treatment will be avoided or minimized  Outcome: Ongoing     Problem: Isolation Precautions - Risk of Spread of Infection  Goal: Prevent transmission of infection  Outcome: Ongoing     Problem: Nutrition Deficits  Goal: Optimize nutritional status  Outcome: Ongoing     Problem: Risk for Fluid Volume Deficit  Goal: Maintain normal heart rhythm  Outcome: Ongoing  Goal: Maintain absence of muscle cramping  Outcome: Ongoing  Goal: Maintain normal serum potassium, sodium, calcium, phosphorus, and pH  Outcome: Ongoing     Problem: Loneliness or Risk for Loneliness  Goal: Demonstrate positive use of time alone when socialization is not possible  Outcome: Ongoing     Problem: Fatigue  Goal: Verbalize increase energy and improved vitality  Outcome: Ongoing     Problem: Patient Education: Go to Patient Education Activity  Goal: Patient/Family Education  Outcome: Ongoing     Problem: Nutrition  Goal: Optimal nutrition therapy  Outcome: Ongoing     Problem: Pain:  Goal: Pain level will decrease  Description: Pain level will decrease  Outcome: Ongoing  Goal: Control of acute pain  Description: Control of acute pain  Outcome: Ongoing  Goal: Control of chronic pain  Description: Control of chronic pain  Outcome: Ongoing

## 2022-01-24 NOTE — PROGRESS NOTES
01/24/22 0253   Vent Information   Vent Type 980   Vent Mode AC/PC   Pressure Ordered 20   Rate Set 20 bmp   FiO2  100 %   SpO2 94 %   SpO2/FiO2 ratio 94   Sensitivity 3   PEEP/CPAP 15   I Time/ I Time % 0.8 s   Humidification Source HME   Vent Patient Data   Peak Inspiratory Pressure 36 cmH2O   Mean Airway Pressure 21 cmH20   Rate Measured 20 br/min   Vt Exhaled 606 mL   Minute Volume 12.9 Liters   I:E Ratio 1:2.5   Breath Sounds   Right Upper Lobe Diminished   Right Middle Lobe Diminished   Right Lower Lobe Diminished   Left Upper Lobe Diminished   Left Lower Lobe Diminished   Alarm Settings   High Pressure Alarm 45 cmH2O   Delay Alarm 20 sec(s)   Low Minute Volume Alarm 2.5 L/min   Apnea (secs) 20 secs   High Respiratory Rate 40 br/min   Low Exhaled Vt  250 mL   ETT (adult)   Placement Date/Time: 01/10/22 (c) 0014   Preoxygenation: Yes  Mask Ventilation: Ventilated by mask (1)  Technique: Video laryngoscopy  Tube Size: 8 mm  Blade Size: 3  Location: Oral  Grade View: Full view of the glottis  Insertion attempts: 2  Placeme. ..    Secured at 24 cm   Measured From Lips   ET Placement Left   Secured By Commercial tube jain   Site Condition Dry   Cuff Pressure   (MOV)

## 2022-01-25 NOTE — PROGRESS NOTES
01/25/22 0824   Vent Information   $Ventilation $Subsequent Day   Vent Type 980   Vent Mode AC/PC   Vt Ordered 0 mL   Rate Set 24 bmp   Peak Flow 0 L/min   Pressure Support 0 cmH20   FiO2  60 %   SpO2 96 %   SpO2/FiO2 ratio 160   Sensitivity 3   PEEP/CPAP 12   I Time/ I Time % 0 s   Vent Patient Data   High Peep/I Pressure 20   Peak Inspiratory Pressure 33 cmH2O   Mean Airway Pressure 19 cmH20   Rate Measured 24 br/min   Vt Exhaled 682 mL   Minute Volume 16.39 Liters   I:E Ratio 1:2.10   Plateau Pressure 28 HCD90   Static Compliance 40 mL/cmH2O   Total PEEP 12 cmH20   Auto PEEP 0.4 cmH20   Cough/Sputum   Sputum How Obtained Endotracheal;Suctioned   $Obtained Sample $Induced Sputum   Cough Productive   Sputum Amount Small   Sputum Color Creamy; White   Tenacity Thick   Spontaneous Breathing Trial (SBT) RT Doc   Pulse 81   Breath Sounds   Right Upper Lobe Diminished   Right Middle Lobe Diminished   Right Lower Lobe Diminished   Left Upper Lobe Diminished   Left Lower Lobe Diminished   Additional Respiratory  Assessments   Resp 24   Alarm Settings   High Pressure Alarm 45 cmH2O   Low Minute Volume Alarm 2.5 L/min   Apnea (secs) 20 secs   High Respiratory Rate 40 br/min   Low Exhaled Vt  250 mL   ETT (adult)   Placement Date/Time: 01/10/22 (c) 0014   Preoxygenation: Yes  Mask Ventilation: Ventilated by mask (1)  Technique: Video laryngoscopy  Tube Size: 8 mm  Blade Size: 3  Location: Oral  Grade View: Full view of the glottis  Insertion attempts: 2  Placeme. ..    Secured at 24 cm   Measured From Lips   ET Placement Right   Secured By Commercial tube jain

## 2022-01-25 NOTE — PROGRESS NOTES
Hospitalist Progress Note      Name:  Darlin Farooq /Age/Sex: 1965  (64 y.o. male)   MRN & CSN:  [id number] & [id number] Admission Date/Time: 2022  9:37 AM   Location:  -A PCP: Josue Melendrez MD         Hospital Day: 19      Assessment and Plan:   Patient is 59-year-old male who was admitted for acute hypoxic kirill failure now intubated. #.  Acute hypoxic respiratory failure: Patient remains intubated on mechanical ventilation. #.  Viral pneumonia due to COVID-19  #. Suspected superimposed bacterial pneumonia  #. Shock likely secondary to COVID-19 and suspected superimposed bacterial pneumonia: Weaned off of Levophed  #. Transaminitis secondary to COVID-19 (Lipitor and baricitinib discontinued)  #. Acute on chronic anemia  #. Other medical problems include seizure disorder, depression, history of craniotomy    Plan:  Patient remains intubated, sedated  Continue bronchodilators and wean off oxygen as tolerated  Continue methylprednisolone per ID recommendations  Continue meropenem and vancomycin per ID recommendations  Continue Lamictal for history of seizure disorder  Appreciate pulmonary and ID recommendations    Patient overall prognosis remains poor due to severe COVID-19 pneumonia. DVT prophylaxis: Lovenox  CODE STATUS: Total support      Subjective. :     Patient seen and examined. He remains intubated and on mechanical ventilation. Objective:   Vitals:   Vitals:    22 1503   BP: (!) 144/91   Pulse: 93   Resp: 21   Temp:    SpO2: 90%         Physical Exam:   GEN: Intubated, sedated  NECK: Supple, no apparent thyromegaly or masses. RESP: Clear lungs to auscultation  CARDIO/VASC: Regular rate and rhythm, normal S1, S2, no murmurs  GI: Abdomen is soft, nontender, no significant organomegaly noted, bowel sounds present  MSK: No gross joint deformities.   Skin: No significant rashes, skin lesions noted  Neuro: Intubated, mechanically ventilated,

## 2022-01-25 NOTE — PROGRESS NOTES
Comprehensive Nutrition Assessment    Type and Reason for Visit:  Reassess    Nutrition Recommendations/Plan:   EN Order: Vital HP @ 60 ml/hr which will provide 2039 kcal (including kcal from propofol) and 126 g protein  Advance to goal rate as tolerated  Will closely monitor GI tolerance  Will monitor propofol rate and adjust TF goal rate as needed  Will monitor nutrition status, weight trends, poc    Nutrition Assessment:  pt remains sedated on vent, EN infusing @ 20 ml/hr per flowsheet, will follow at high nutrition risk    Malnutrition Assessment:  Malnutrition Status:  Insufficient data    Context:  Acute Illness       Estimated Daily Nutrient Needs:  Energy (kcal):  2057 ([facility] 2003b); Weight Used for Energy Requirements:  Current     Protein (g):  101-167 (1.2-2 g/kg IBW); Weight Used for Protein Requirements:  Ideal        Fluid (ml/day):  2057 or per MD; Method Used for Fluid Requirements:  1 ml/kcal      Nutrition Related Findings:  Labs: Na 146, Glucose 107, H/H: 8.2/27.0, WBC 17.4      Wounds:  None       Current Nutrition Therapies:    Current Tube Feeding (TF) Orders:  · Feeding Route: Orogastric  · Formula: Peptide Based High Protein  · Schedule: Continuous (20 ml/hr per flowsheet)  · Current TF & Flush Orders Provides: 480 kcal and 42 g protein    Additional Calorie Sources:   Pt is receiving ~599 kcal from current propofol rate    Anthropometric Measures:  · Height: 6' 0.01\" (182.9 cm)  · Current Body Weight: 189 lb 2.5 oz (85.8 kg)   · Admission Body Weight: 184 lb 15.5 oz (83.9 kg)    · Usual Body Weight: 188 lb 6.4 oz (85.5 kg) (10/21/21)     · Ideal Body Weight: 178 lbs; % Ideal Body Weight 106.3 %   · BMI: 25.6  · BMI Categories: Overweight (BMI 25.0-29. 9)       Nutrition Diagnosis:   · Inadequate oral intake related to acute injury/trauma as evidenced by NPO or clear liquid status due to medical condition    Nutrition Interventions:   Food and/or Nutrient Delivery:  Modify Tube Feeding  Nutrition Education/Counseling:  No recommendation at this time   Coordination of Nutrition Care:  Continue to monitor while inpatient    Goals:  Pt will meet greater than 75% of estimated nutrient needs via EN       Nutrition Monitoring and Evaluation:   Behavioral-Environmental Outcomes:  None Identified   Food/Nutrient Intake Outcomes:  Enteral Nutrition Intake/Tolerance  Physical Signs/Symptoms Outcomes:  Biochemical Data,GI Status,Hemodynamic Status,Fluid Status or Edema,Weight,Skin     Discharge Planning:     Too soon to determine     Electronically signed by Princess Howard MS, RD, LD on 1/25/22 at 9:55 AM EST    Contact: [phone]

## 2022-01-25 NOTE — PROGRESS NOTES
[facility]  consulted by Dr. Tali Patel for monitoring and adjustment. Indication for treatment: Pneumonia (HAP)  Goal trough: 15 mcg/mL  AUC/MANSOOR: 400-600    Pertinent Laboratory Values:   Temp Readings from Last 3 Encounters:   01/25/22 98 °F (36.7 °C) (Oral)   01/06/22 98.4 °F (36.9 °C) (Oral)   12/16/21 97.2 °F (36.2 °C) (Infrared)     Recent Labs     01/23/22  0530 01/25/22  0532   WBC 19.1* 17.4*     Recent Labs     01/23/22  0530 01/24/22  0546 01/25/22  0532   BUN 39* 43* 40*   CREATININE 0.8* 0.9 0.8*     Estimated Creatinine Clearance: 113 mL/min (A) (based on SCr of 0.8 mg/dL (L)).     Intake/Output Summary (Last 24 hours) at 1/25/2022 1202  Last data filed at 1/25/2022 4861  Gross per 24 hour   Intake 2012.55 ml   Output 1425 ml   Net 587.55 ml       Pertinent Cultures:  Date    Source    Results  1/22   MRSA nasal screen  Pending  1/23   Respiratory   Enterobacter      Vancomycin level:   TROUGH:    Recent Labs     01/23/22  0530   VANCOTROUGH 14.3     RANDOM:    Recent Labs     01/25/22  0532   VANCORANDOM 23.1       Assessment:  · WBC and temperature: elevated WBC/afebrile  · PCT trending down  · SCr, BUN, and urine output:   · BUN remains slightly elevated  · SCr stable @ 0.8  · Continue to monitor renal trends closely  · Day(s) of therapy: 3  · Vancomycin concentration:  · 1/23:  14.3, 4h post dose,  (1250 mg q12h)  · 1/25: 23.1, 9.5h post dose,  (1250 mg q12h)    Plan:  · Continue vancomycin 1250 mg IVPB q12h with a therapeutic level  · Predicted (12h) trough: 14.7,   · Repeat next level in 3-5 days or sooner as appropriate per renal trends  · Pharmacy will continue to monitor patient and adjust therapy as indicated    [address] 1/28 @ 0600    Thank you for the consult,  Candida Velarde, [address] [place] [address], PharmD  1/25/2022 12:02 PM

## 2022-01-25 NOTE — PROGRESS NOTES
Patient's family in this morning and updated on patient's status. Family verbalizes understanding at this time. Patient flipped to supine position at 1530. Patient tolerated procedure without difficult. VSS. No s/s of acute distress noted. Will continue to monitor.

## 2022-01-25 NOTE — CARE COORDINATION
Patient now out of isolation; intubated 15 days. Awaiting physician discussion (with daughter) of trach/PEG for next level of care.  Mello Roland RN

## 2022-01-25 NOTE — PROGRESS NOTES
Infectious Disease Progress Note  2022   Patient Name: Emily Lara : 1965       Reason for visit: F/u COVID-19 pneumonia, acute respiratory failure? History:? Interval history noted  Intubated, sedated and on mechanical ventilation  Physical Exam:  Vital Signs: BP (!) 159/93   Pulse 86   Temp 98 °F (36.7 °C) (Oral)   Resp 24   Ht 6' 0.01\" (1.829 m)   Wt 189 lb 2.5 oz (85.8 kg)   SpO2 96%   BMI 25.65 kg/m²     Gen: intubated and sedated  Skin: no stigmata of endocarditis  Wounds: C/D/I  HEMT: AT/NC ETT OGT  Eyes: PERRLA. Neck: Supple. Trachea midline. No LAD. Chest:  transmitted breath sounds. Heart: RRR  Abd: soft, non-distended, no tenderness, no hepatomegaly. Normoactive bowel sounds. Ext: no clubbing, cyanosis, or edema  Catheter Site: without erythema or tenderness  LDA: left arm PICC , Urethral catheter;  Neuro: sedated and on the ventilator. Radiologic / Imaging / TESTING  ONE XRAY VIEW OF THE CHEST     2022 6:50 am     COMPARISON:   2022     HISTORY:   ORDERING SYSTEM PROVIDED HISTORY: intubated patient   TECHNOLOGIST PROVIDED HISTORY:   Reason for exam:->intubated patient   Reason for Exam: VENT     Follow-up exam     FINDINGS:   Endotracheal tube tip is 7.1 cm above the nicol. Multifocal airspace   opacities are not significantly changed. Lines and tubes are otherwise   stable in positioning. No pleural effusion. Stable cardiomediastinal   silhouette. No pneumothorax. Osseous structures otherwise stable. Impression:   Endotracheal tube tip is 7.1 cm above the nicol.   Otherwise no significant   interval change in bilateral pneumonia        Labs:    Recent Results (from the past 24 hour(s))   Basic metabolic panel    Collection Time: 22  5:32 AM   Result Value Ref Range    Sodium 146 (H) 135 - 145 MMOL/L    Potassium 3.7 3.5 - 5.1 MMOL/L    Chloride 114 (H) 99 - 110 mMol/L    CO2 23 21 - 32 MMOL/L    Anion Gap 9 4 - 16    BUN 40 (H) 6 - 23 MG/DL CREATININE 0.8 (L) 0.9 - 1.3 MG/DL    Glucose 107 (H) 70 - 99 MG/DL    Calcium 7.7 (L) 8.3 - 10.6 MG/DL    GFR Non-African American >60 >60 mL/min/1.73m2    GFR African American >60 >60 mL/min/1.73m2   CBC auto differential    Collection Time: 01/25/22  5:32 AM   Result Value Ref Range    WBC 17.4 (H) 4.0 - 10.5 K/CU MM    RBC 2.62 (L) 4.6 - 6.2 M/CU MM    Hemoglobin 8.3 (L) 13.5 - 18.0 GM/DL    Hematocrit 27.0 (L) 42 - 52 %    .1 (H) 78 - 100 FL    MCH 31.7 (H) 27 - 31 PG    MCHC 30.7 (L) 32.0 - 36.0 %    RDW 13.2 11.7 - 14.9 %    Platelets 826 729 - 872 K/CU MM    MPV 10.2 7.5 - 11.1 FL    Myelocyte Percent 2 (H) 0.0 %    Metamyelocytes Relative 2 (H) 0.0 %    Bands Relative 8 5 - 11 %    Segs Relative 81.0 (H) 36 - 66 %    Lymphocytes % 6.0 (L) 24 - 44 %    Monocytes % 1.0 0 - 4 %    Myelocytes Absolute 0.35 K/CU MM    Metamyelocytes Absolute 0.35 K/CU MM    Bands Absolute 1.39 K/CU MM    Segs Absolute 14.1 K/CU MM    Lymphocytes Absolute 1.0 K/CU MM    Monocytes Absolute 0.2 K/CU MM    Differential Type MANUAL DIFFERENTIAL     Anisocytosis 1+     Macrocytes 1+     Polychromasia 1+     Toxic Granulation PRESENT     PLT Morphology FEW    Procalcitonin    Collection Time: 01/25/22  5:32 AM   Result Value Ref Range    Procalcitonin 1.52    Vancomycin, random    Collection Time: 01/25/22  5:32 AM   Result Value Ref Range    Vancomycin Rm 23.1 UG/ML    DOSE AMOUNT DOSE AMT.  GIVEN - 1250mg     DOSE TIME DOSE TIME GIVEN - 0800    Blood gas, arterial    Collection Time: 01/25/22  6:00 AM   Result Value Ref Range    pH, Bld 7.37 7.34 - 7.45    pCO2, Arterial 34.0 32 - 45 MMHG    pO2, Arterial 109 (H) 75 - 100 MMHG    Base Excess 5 (H) 0 - 3.3    HCO3, Arterial 19.7 18 - 23 MMOL/L    CO2 Content 20.7 19 - 24 MMOL/L    O2 Sat 97.4 (H) 96 - 97 %    Carbon Monoxide, Blood 1.2 0 - 5 %    Methemoglobin, Arterial 1.3 <1.5 %    Comment PC 20, RR 24, O2 .70, PEEP 15    POCT Glucose    Collection Time: 01/25/22  9:36 AM Result Value Ref Range    POC Glucose 97 70 - 99 MG/DL     CULTURE results: Invalid input(s): BLOOD CULTURE,  URINE CULTURE, SURGICAL CULTURE    Diagnosis:  Patient Active Problem List   Diagnosis    History of craniotomy    Malformation of cortical development of brain ([address]    Open wedge compression fracture of T11 vertebra with routine healing    Moderate episode of recurrent major depressive disorder (HCC)    Rhinitis, allergic    S/P placement of VNS (vagus nerve stimulation) device    Stenosis of right carotid artery    Symptomatic localization-related epilepsy (HCC)    HLD    Chronic right shoulder pain    Positive FIT (fecal immunochemical test)    Open wedge compression fracture of T12 vertebra with routine healing    Pneumonia due to COVID-19 virus    Acute respiratory failure with hypoxia (HCC)       Active Problems  Active Problems:    Pneumonia due to COVID-19 virus    Acute respiratory failure with hypoxia (HCC)  Resolved Problems:    * No resolved hospital problems. *      Impression and plan   Summary and rationale: Patient is a 64 y.o.  male with a medical hx of HLD, seizure disorder, s/p craniotomy admitted for severe COVID-19 pneumonia, intubated on 1/9/2022. He has critical COVID-19 pneumonia with hyper inflammation. Respiratory culture positive for Enterobacter cloacae   Enterobacter cloacae superimposed bacterial infection.  COVID-19 symptom onset: 12/24/2021   COVID-19 test date: 1/27/2022   Expected discontinuation of isolation precautions: 1/13/2022   Clinical status: Some improvement as FiO2 is down to 60%, he is afebrile, CRP down to 154.7   Therapeutic:  o Ongoing antibiotics: continue meropenem 1/22-  o Immunomodulators:   - Solu-Medrol: Pulse dosing, 3-day course of 1000 mg daily to be completed on 1/25/2022. . continue on Solu-Medrol 60 mg every 6 hours  o Completed antibiotics: Ceftriaxone, azithromycin, vancomycin 1/22-25,  o Other agents:    Diagnostic: Trend CRP   F/u:   Other:      Electronically signed by: Electronically signed by Jennie Faye MD on 1/25/2022 at 11:01 AM

## 2022-01-25 NOTE — PROGRESS NOTES
01/25/22 0617   Vent Information   Vent Type 980   Vent Mode AC/PC   Vt Ordered 0 mL   Pressure Ordered 20   Rate Set 24 bmp   Peak Flow 0 L/min   Pressure Support 0 cmH20   FiO2  60 %   SpO2 97 %   SpO2/FiO2 ratio 161.67   Sensitivity 3   PEEP/CPAP 12   I Time/ I Time % 0 s   Vent Patient Data   High Peep/I Pressure 20   Peak Inspiratory Pressure 33 cmH2O   Mean Airway Pressure 19 cmH20   Rate Measured 24 br/min   Vt Exhaled 696 mL   Minute Volume 16.7 Liters   I:E Ratio 1:2.10   Spontaneous Breathing Trial (SBT) RT Doc   Pulse 82   Additional Respiratory  Assessments   Resp 24   Alarm Settings   High Pressure Alarm 45 cmH2O   Delay Alarm 20 sec(s)   Low Minute Volume Alarm 2.5 L/min   Apnea (secs) 20 secs   High Respiratory Rate 40 br/min   Low Exhaled Vt  250 mL

## 2022-01-25 NOTE — PROGRESS NOTES
pulmonary      SUBJECTIVE:  He remains on full vent support     OBJECTIVE    VITALS:  /88   Pulse 82   Temp 97.7 °F (36.5 °C) (Rectal)   Resp 24   Ht 6' (1.829 m)   Wt 189 lb 2.5 oz (85.8 kg)   SpO2 97%   BMI 25.65 kg/m²   HEAD AND FACE EXAM:  No throat injection, no active exudate,no thrush  NECK EXAM;No JVD, no masses, symmetrical  CHEST EXAM; Expansion equal and symmetrical, no masses  LUNG EXAM; Good breath sounds bilaterally.  There are expiratory wheezes both lungs, there are crackles at both lung bases  CARDIOVASCULAR EXAM: Positive S1 and S2, no S3 or S4, no clicks ,no murmurs  RIGHT AND LEFT LOWER EXTRIMITY EXAM: No edema, no swelling, no inflamation            LABS   Lab Results   Component Value Date    WBC 17.4 (H) 01/25/2022    HGB 8.3 (L) 01/25/2022    HCT 27.0 (L) 01/25/2022    .1 (H) 01/25/2022     01/25/2022     Lab Results   Component Value Date    CREATININE 0.8 (L) 01/25/2022    BUN 40 (H) 01/25/2022     (H) 01/25/2022    K 3.7 01/25/2022     (H) 01/25/2022    CO2 23 01/25/2022     No results found for: INR, PROTIME     No results found for: PHOS     Recent Labs     01/23/22  0600 01/24/22  0600 01/25/22  0600   PH 7.36 7.26* 7.37   PO2ART 58* 73* 109*   TCB9PIW 39.0 52.0* 34.0   O2SAT 89.4* 92.5* 97.4*         Wt Readings from Last 3 Encounters:   01/17/22 189 lb 2.5 oz (85.8 kg)   01/06/22 185 lb (83.9 kg)   12/16/21 186 lb 9.6 oz (84.6 kg)        EXAMINATION:   ONE XRAY VIEW OF THE CHEST       1/25/2022 6:05 am       COMPARISON:   Chest radiograph dated January 24, 2022       HISTORY:   ORDERING SYSTEM PROVIDED HISTORY: intubated patient   TECHNOLOGIST PROVIDED HISTORY:   Reason for exam:->intubated patient   Reason for Exam: intubated patient       FINDINGS:   Tip of the ET tube is 6.8 cm above the nicol.  The enteric tube extends   below the diaphragm.  The cardiomediastinal silhouette is stable.  A   left-sided PICC line is seen.  Bibasilar parenchymal infiltrates are noted   without significant change.  There is no evidence of a pneumothorax.           Impression   No significant change in bibasilar parenchymal infiltrates.                   ASSESMENT  Ac resp failure  covid pneumonia  Bact pneumonia        PLAN  1. cpm  2. Cont full vent support  3. Continue to wean o2 and peep  4.  Tube feed    1/25/2022  Anthony Madrigal MD, M.D.

## 2022-01-26 NOTE — PROGRESS NOTES
Patients spouse is at the bedside and updated on plan of care. She expressed that she would like to talk to Dr. Laurel Irizarry about future plans of care. Dr. Laurel Irizarry notified via [employer].

## 2022-01-26 NOTE — PROGRESS NOTES
pulmonary      SUBJECTIVE: intubated on vent     OBJECTIVE    VITALS:  BP 94/61   Pulse 68   Temp 98.1 °F (36.7 °C) (Rectal)   Resp 25   Ht 6' 0.01\" (1.829 m)   Wt 189 lb 2.5 oz (85.8 kg)   SpO2 93%   BMI 25.65 kg/m²   HEAD AND FACE EXAM:  No throat injection, no active exudate,no thrush  NECK EXAM;No JVD, no masses, symmetrical  CHEST EXAM; Expansion equal and symmetrical, no masses  LUNG EXAM; Good breath sounds bilaterally.  There are expiratory wheezes both lungs, there are crackles at both lung bases  CARDIOVASCULAR EXAM: Positive S1 and S2, no S3 or S4, no clicks ,no murmurs  RIGHT AND LEFT LOWER EXTRIMITY EXAM: No edema, no swelling, no inflamation            LABS   Lab Results   Component Value Date    WBC 17.4 (H) 01/25/2022    HGB 8.3 (L) 01/25/2022    HCT 27.0 (L) 01/25/2022    .1 (H) 01/25/2022     01/25/2022     Lab Results   Component Value Date    CREATININE 0.8 (L) 01/25/2022    BUN 40 (H) 01/25/2022     01/25/2022    K 4.1 01/25/2022     (H) 01/25/2022    CO2 21 01/25/2022     No results found for: INR, PROTIME       Lab Results   Component Value Date    PHOS 4.2 01/25/2022        Recent Labs     01/24/22  0600 01/25/22  0600 01/26/22  0600   PH 7.26* 7.37 7.40   PO2ART 73* 109* 76   [id number] 52.0* 34.0 36.0   O2SAT 92.5* 97.4* 90.6*         Wt Readings from Last 3 Encounters:   01/17/22 189 lb 2.5 oz (85.8 kg)   01/06/22 185 lb (83.9 kg)   12/16/21 186 lb 9.6 oz (84.6 kg)        EXAMINATION:   ONE XRAY VIEW OF THE CHEST       1/26/2022 6:26 am       COMPARISON:   Chest radiograph dated January 25, 2022       HISTORY:   ORDERING SYSTEM PROVIDED HISTORY: intubated patient   TECHNOLOGIST PROVIDED HISTORY:   Reason for exam:->intubated patient   Reason for Exam: VENT       FINDINGS:   Tip of the ET tube is 8.9 cm above the nicol.  This should be further   advanced.  The enteric tube extends below the diaphragm.  A left-sided PICC   line is in place. Aye cardiomediastinal silhouette is stable.  Patchy   bibasilar all infiltrates are noted.  There is no significant change.  There   is no pneumothorax.           Impression   1. Tip of the ET tube is 8.9 cm above the nicol.  This should be further   advanced. 2. Bibasilar pulmonary infiltrates are noted without significant change.                   ASSESMENT  Ac resp failure  covid pneumonia  Bact pneumonia        PLAN  1. cpm  2. Cont full vent support  3. Try to wean peep  4. Tube feed  5.  D/w pt mom yesterday over the phone and updated her on pt condition  6. cxr pending    1/26/2022  Pancho Mckeon MD, M.D.

## 2022-01-26 NOTE — PROGRESS NOTES
This RN asked Dr. Mili Yang if he would put in insulin parameters and orders for this patient in case he were to need coverage. Dr. Mili Yang responded with \"Ok. \"

## 2022-01-26 NOTE — PROGRESS NOTES
01/26/22 0330   Vent Information   Vent Type 980   Vent Mode AC/PC   Vt Ordered 0 mL   Pressure Ordered 20   Rate Set 24 bmp   Peak Flow 0 L/min   Pressure Support 0 cmH20   FiO2  65 %   SpO2 92 %   SpO2/FiO2 ratio 141.54   Sensitivity 3   PEEP/CPAP 12   I Time/ I Time % 0 s   Vent Patient Data   High Peep/I Pressure 20   Peak Inspiratory Pressure 33 cmH2O   Mean Airway Pressure 19 cmH20   Rate Measured 25 br/min   Vt Exhaled 734 mL   Minute Volume 17.39 Liters   I:E Ratio 1:2.10   Cough/Sputum   Sputum How Obtained Endotracheal;Suctioned   $Obtained Sample $Induced Sputum   Sputum Amount Small   Sputum Color Creamy   Spontaneous Breathing Trial (SBT) RT Doc   Pulse 70   Additional Respiratory  Assessments   Resp 23   Alarm Settings   High Pressure Alarm 45 cmH2O   Delay Alarm 20 sec(s)   Low Minute Volume Alarm 2.5 L/min   Apnea (secs) 20 secs   High Respiratory Rate 40 br/min   Low Exhaled Vt  250 mL   ETT (adult)   Placement Date/Time: 01/10/22 (c) 0014   Preoxygenation: Yes  Mask Ventilation: Ventilated by mask (1)  Technique: Video laryngoscopy  Tube Size: 8 mm  Blade Size: 3  Location: Oral  Grade View: Full view of the glottis  Insertion attempts: 2  Placeme. .. Secured at 24 cm   Measured From Lips   ET Placement Right   Secured By Commercial tube jain   Site Condition Dry   ETT (adult)   Placement Date/Time: 01/23/22 (c) 0930   Preoxygenation: Yes  Technique: Video laryngoscopy  Type: Cuffed  Tube Size: (c) 8 mm  Laryngoscope: Mac  Blade Size: 4  Location: Oral  Grade View: Full view of the glottis  Insertion attempts: 1  Placement Ve. ..    Secured at 24 cm   Measured From Lips   ET Placement Right   Secured By Commercial tube jain   Site Condition Dry

## 2022-01-26 NOTE — PROGRESS NOTES
01/26/22 1114   Vent Information   Equipment Changed HME   Vent Type 980   Vent Mode AC/PC   Vt Ordered 0 mL   Pressure Ordered 20   Rate Set 24 bmp   Peak Flow 0 L/min   Pressure Support 0 cmH20   FiO2  65 %   SpO2 92 %   SpO2/FiO2 ratio 141.54   Sensitivity 3   PEEP/CPAP 11   I Time/ I Time % 0 s   Humidification Source HME   Vent Patient Data   High Peep/I Pressure 20   Peak Inspiratory Pressure 32 cmH2O   Mean Airway Pressure 18 cmH20   Rate Measured 24 br/min   Vt Exhaled 706 mL   Minute Volume 16.89 Liters   I:E Ratio 1:2.10   Spontaneous Breathing Trial (SBT) RT Doc   Pulse 69   Breath Sounds   Right Upper Lobe Diminished   Right Middle Lobe Diminished   Right Lower Lobe Diminished   Left Upper Lobe Diminished   Left Lower Lobe Diminished   Additional Respiratory  Assessments   Resp 18   Position Semi-Phelps's   Alarm Settings   High Pressure Alarm 45 cmH2O   Delay Alarm 20 sec(s)   Low Minute Volume Alarm 2.5 L/min   Apnea (secs) 20 secs   High Respiratory Rate 40 br/min   Low Exhaled Vt  250 mL   ETT (adult)   Placement Date/Time: 01/23/22 (c 0930   Preoxygenation: Yes  Technique: Video laryngoscopy  Type: Cuffed  Tube Size: (c) 8 mm  Laryngoscope: Mac  Blade Size: 4  Location: Oral  Grade View: Full view of the glottis  Insertion attempts: 1  Placement Ve. ..    Secured at 27 cm   Measured From Lips   ET Placement Left   Secured By Commercial tube jain   Site Condition Dry

## 2022-01-26 NOTE — PROGRESS NOTES
D/w pt mother about his serious condition over the phone. Name is sandra. She says that daughters are concerned and would like pt to be transferred to [facility]. I tild her that they will not do anything different and probably would decline transfer. If they still want pt to be transferred then they need to call [facility] and if they agree then we would be happy to transfer.

## 2022-01-26 NOTE — PROGRESS NOTES
Hospitalist Progress Note      Name:  Owen Blanchard /Age/Sex: 1965  (64 y.o. male)   MRN & CSN:  [id number] & [id number] Admission Date/Time: 2022  9:37 AM   Location:  -A PCP: Kalyn Dodd MD         Hospital Day: 20      Assessment and Plan:   Patient is 70-year-old male who was admitted for acute hypoxic kirill failure now intubated. #.  Acute hypoxic respiratory failure: Patient remains intubated on mechanical ventilation. #.  Viral pneumonia due to COVID-19  #. Suspected superimposed bacterial pneumonia  #. Shock likely secondary to COVID-19 and suspected superimposed bacterial pneumonia: Weaned off of Levophed  #. Transaminitis secondary to COVID-19 (Lipitor and baricitinib discontinued)  #. Acute on chronic anemia  #. Other medical problems include seizure disorder, depression, history of craniotomy    Plan:  Patient remains intubated, sedated  Continue bronchodilators and wean ventilator as tolerated. Continue methylprednisolone per ID recommendations  Continue meropenem per ID recommendations  Continue Lamictal for history of seizure disorder  Appreciate pulmonary and ID recommendations    Patient overall prognosis remains poor due to severe COVID-19 pneumonia. DVT prophylaxis: Lovenox  CODE STATUS: Total support      Subjective. :     Patient seen and examined. He remains intubated and on mechanical ventilation. Objective:   Vitals:   Vitals:    22 1600   BP: 106/76   Pulse: 67   Resp: 24   Temp:    SpO2: 92%         Physical Exam:   GEN: Intubated, sedated  NECK: Supple, no apparent thyromegaly or masses. RESP: Clear lungs to auscultation  CARDIO/VASC: Regular rate and rhythm, normal S1, S2, no murmurs  GI: Abdomen is soft, nontender, no significant organomegaly noted, bowel sounds present  MSK: No gross joint deformities.   Skin: No significant rashes, skin lesions noted  Neuro: Intubated, mechanically ventilated, sedated  Medications: Medications:    methylPREDNISolone  60 mg IntraVENous Q6H    dilTIAZem  30 mg Oral 4 times per day    [START ON 1/27/2022] fluconazole  400 mg IntraVENous Q24H    fluconazole  800 mg IntraVENous Once    pantoprazole  40 mg IntraVENous Daily    meropenem  1,000 mg IntraVENous Q8H    midodrine  10 mg Oral TID WC    cloBAZam  20 mg Oral BID    enoxaparin  1 mg/kg SubCUTAneous BID    lidocaine PF  5 mL IntraDERmal Once    sodium chloride flush  5-40 mL IntraVENous 2 times per day    chlorhexidine  15 mL Mouth/Throat BID    famotidine (PEPCID) injection  20 mg IntraVENous BID    albuterol sulfate HFA  2 puff Inhalation Q4H WA    ipratropium  2 puff Inhalation 4x daily    lamoTRIgine  150 mg Oral BID    topiramate  200 mg Oral BID    [Held by provider] atorvastatin  80 mg Oral Nightly    sertraline  25 mg Oral Daily    ascorbic acid  500 mg Oral BID    guaiFENesin  600 mg Oral BID    sodium chloride flush  5-40 mL IntraVENous 2 times per day    Vitamin D  2,000 Units Oral Daily    influenza virus vaccine  0.5 mL IntraMUSCular Prior to discharge      Infusions:    sodium chloride 50 mL/hr at 01/26/22 1237    norepinephrine Stopped (01/19/22 1706)    sodium chloride Stopped (01/15/22 0125)    propofol 45 mcg/kg/min (01/26/22 1235)    fentaNYL 62.5 mcg/hr (01/25/22 2356)    sodium chloride       PRN Meds: potassium chloride, 40 mEq, PRN   Or  potassium alternative oral replacement, 40 mEq, PRN   Or  potassium chloride, 10 mEq, PRN  sodium chloride flush, 5-40 mL, PRN  sodium chloride, 25 mL, PRN  LORazepam, 1 mg, Q4H PRN  clonazePAM, 0.5 mg, BID PRN  bisacodyl, 10 mg, Daily PRN  sodium chloride flush, 5-40 mL, PRN  sodium chloride, 25 mL, PRN  ondansetron, 4 mg, Q8H PRN   Or  ondansetron, 4 mg, Q6H PRN  polyethylene glycol, 17 g, Daily PRN  acetaminophen, 650 mg, Q6H PRN   Or  acetaminophen, 650 mg, Q6H PRN          Electronically signed by Gallito Bansal MD on 1/26/2022 at 4:22 PM

## 2022-01-26 NOTE — PROGRESS NOTES
Advanced ET tube from 24cm @lip to 27cm @lip per Dr. Kendell Mack and according to chest x-ray. Repeat chest x-ray has been ordered.

## 2022-01-26 NOTE — PROGRESS NOTES
01/26/22 1600   Vent Information   Vent Type 980   Vent Mode AC/PC   Vt Ordered 0 mL   Pressure Ordered 20   Rate Set 24 bmp   Peak Flow 0 L/min   Pressure Support 0 cmH20   FiO2  85 %   SpO2 92 %   SpO2/FiO2 ratio 108.24   Sensitivity 3   PEEP/CPAP 11   I Time/ I Time % 0 s   Humidification Source HME   Nitric Oxide/Epoprostenol In Use? No   Vent Patient Data   High Peep/I Pressure 20   Peak Inspiratory Pressure 32 cmH2O   Mean Airway Pressure 18 cmH20   Rate Measured 24 br/min   Vt Exhaled 705 mL   Minute Volume 16.89 Liters   I:E Ratio 1:2.10   Cough/Sputum   Sputum How Obtained Endotracheal;Suctioned   $Obtained Sample $Induced Sputum   Cough Non-productive   Frequency Infrequent   Sputum Amount None   Sputum Color None   Tenacity None   Spontaneous Breathing Trial (SBT) RT Doc   Pulse 67   Breath Sounds   Right Upper Lobe Diminished   Right Middle Lobe Diminished   Right Lower Lobe Diminished   Left Upper Lobe Diminished   Left Lower Lobe Diminished   Additional Respiratory  Assessments   Resp 24   Position Semi-Phelps's   Alarm Settings   High Pressure Alarm 45 cmH2O   Delay Alarm 20 sec(s)   Low Minute Volume Alarm 2.5 L/min   Apnea (secs) 20 secs   High Respiratory Rate 40 br/min   Low Exhaled Vt  250 mL   ETT (adult)   Placement Date/Time: 01/23/22 (c 0930   Preoxygenation: Yes  Technique: Video laryngoscopy  Type: Cuffed  Tube Size: (c) 8 mm  Laryngoscope: Mac  Blade Size: 4  Location: Oral  Grade View: Full view of the glottis  Insertion attempts: 1  Placement Ve. ..    Secured at 26 cm   Measured From Lips   ET Placement Left   Secured By Commercial tube jain   Site Condition Dry

## 2022-01-26 NOTE — CONSULTS
INPATIENT CARDIOLOGY CONSULT NOTE         Reason for consultation:  AFIB    Referring physician:  Sammie Hare MD     Primary care physician: Marisol Purcell MD      Dear Sammie Hare MD Thank you for the consult    Chief Complaint   Patient presents with    Shortness of Breath     on 6L NC    Positive For Covid-19       History of present illness:Brendan is a 64 y. o.year old who  presents with   Chief Complaint   Patient presents with    Shortness of Breath     on 6L NC    Positive For Covid-19     Patient is a 80-year-old gentleman was admitted to the [facility] ICU intubated with COVID-19 pneumonia. Cardiology is consulted to evaluate patient for new onset atrial fibrillation with rapid ventricular response overnight. Currently patient is in sinus rhythm    Last evening patient had new onset atrial fibrillation which spontaneously converted back to sinus rhythm    Patient is currently intubated and cannot provide any further history    Past medical history:    has a past medical history of HLD, Moderate episode of recurrent major depressive disorder ([address] and Seizures ([address] Past surgical history:   has a past surgical history that includes brain surgery. Social History:   reports that he has never smoked. He has never used smokeless tobacco. He reports that he does not drink alcohol and does not use drugs.   Family history:   no family history of CAD, STROKE of DM    No Known Allergies    methylPREDNISolone sodium (SOLU-MEDROL) injection 60 mg, Q6H  amiodarone (CORDARONE) 450 mg in dextrose 5 % 250 mL infusion, Continuous  pantoprazole (PROTONIX) injection 40 mg, Daily  meropenem (MERREM) 1,000 mg in sodium chloride 0.9 % 100 mL IVPB (mini-bag), Q8H  midodrine (PROAMATINE) tablet 10 mg, TID WC  potassium chloride (KLOR-CON M) extended release tablet 40 mEq, PRN   Or  potassium bicarb-citric acid (EFFER-K) effervescent tablet 40 mEq, PRN   Or  potassium chloride 10 mEq/100 mL IVPB (Peripheral Line), PRN  cloBAZam (ONFI) tablet 20 mg, BID  0.45 % sodium chloride infusion, Continuous  norepinephrine (LEVOPHED) 16 mg in sodium chloride 0.9 % 250 mL infusion, Continuous  enoxaparin (LOVENOX) injection 80 mg, BID  lidocaine PF 1 % injection 5 mL, Once  sodium chloride flush 0.9 % injection 5-40 mL, 2 times per day  sodium chloride flush 0.9 % injection 5-40 mL, PRN  0.9 % sodium chloride infusion, PRN  LORazepam (ATIVAN) injection 1 mg, Q4H PRN  chlorhexidine (PERIDEX) 0.12 % solution 15 mL, BID  famotidine (PEPCID) injection 20 mg, BID  propofol injection, Continuous  fentaNYL (SUBLIMAZE) 1,000 mcg in sodium chloride 0.9% 100 mL infusion, Continuous  albuterol sulfate  (90 Base) MCG/ACT inhaler 2 puff, Q4H WA  ipratropium (ATROVENT HFA) 17 MCG/ACT inhaler 2 puff, 4x daily  lamoTRIgine (LAMICTAL) tablet 150 mg, BID  topiramate (TOPAMAX) tablet 200 mg, BID  clonazePAM (KLONOPIN) tablet 0.5 mg, BID PRN  [Held by provider] atorvastatin (LIPITOR) tablet 80 mg, Nightly  bisacodyl (DULCOLAX) EC tablet 10 mg, Daily PRN  sertraline (ZOLOFT) tablet 25 mg, Daily  ascorbic acid (VITAMIN C) tablet 500 mg, BID  guaiFENesin (MUCINEX) extended release tablet 600 mg, BID  sodium chloride flush 0.9 % injection 5-40 mL, 2 times per day  sodium chloride flush 0.9 % injection 5-40 mL, PRN  0.9 % sodium chloride infusion, PRN  ondansetron (ZOFRAN-ODT) disintegrating tablet 4 mg, Q8H PRN   Or  ondansetron (ZOFRAN) injection 4 mg, Q6H PRN  polyethylene glycol (GLYCOLAX) packet 17 g, Daily PRN  acetaminophen (TYLENOL) tablet 650 mg, Q6H PRN   Or  acetaminophen (TYLENOL) suppository 650 mg, Q6H PRN  Vitamin D (CHOLECALCIFEROL) tablet 2,000 Units, Daily  influenza quadrivalent split vaccine (FLUZONE;FLUARIX;FLULAVAL;AFLURIA) injection 0.5 mL, Prior to discharge      Current Facility-Administered Medications   Medication Dose Route Frequency Provider Last Rate Last Admin    methylPREDNISolone sodium (SOLU-MEDROL) injection 60 mg  60 mg IntraVENous Be Brown MD        amiodarone (CORDARONE) 450 mg in dextrose 5 % 250 mL infusion  0.5 mg/min IntraVENous Continuous Joanie Byrnes MD 16.7 mL/hr at 01/26/22 0351 0.5 mg/min at 01/26/22 0351    pantoprazole (PROTONIX) injection 40 mg  40 mg IntraVENous Daily Lizette Abdi MD   40 mg at 01/26/22 0853    meropenem (MERREM) 1,000 mg in sodium chloride 0.9 % 100 mL IVPB (mini-bag)  1,000 mg IntraVENous Be Brown  mL/hr at 01/26/22 1125 1,000 mg at 01/26/22 1125    midodrine (PROAMATINE) tablet 10 mg  10 mg Oral TID WC Josie Enriquez MD   10 mg at 01/26/22 1115    potassium chloride (KLOR-CON M) extended release tablet 40 mEq  40 mEq Oral PRN Josie Enriquez MD        Or    potassium bicarb-citric acid (EFFER-K) effervescent tablet 40 mEq  40 mEq Oral PRN Josie Enriquez MD   40 mEq at 01/20/22 1017    Or    potassium chloride 10 mEq/100 mL IVPB (Peripheral Line)  10 mEq IntraVENous PRN Josie Enriquez MD        cloBAZam (ONFI) tablet 20 mg  20 mg Oral BID Angella Lanza MD   20 mg at 01/26/22 0855    0.45 % sodium chloride infusion   IntraVENous Continuous Josie Enriquez MD 50 mL/hr at 01/24/22 1819 New Bag at 01/24/22 1819    norepinephrine (LEVOPHED) 16 mg in sodium chloride 0.9 % 250 mL infusion  2-100 mcg/min IntraVENous Continuous Josie Enriquez MD   Stopped at 01/19/22 1706    enoxaparin (LOVENOX) injection 80 mg  1 mg/kg SubCUTAneous BID Lizette Abdi MD   80 mg at 01/26/22 0855    lidocaine PF 1 % injection 5 mL  5 mL IntraDERmal Once Cathryne Khat, APRN - NP        sodium chloride flush 0.9 % injection 5-40 mL  5-40 mL IntraVENous 2 times per day Cathryne Khat, APRN - NP   10 mL at 01/26/22 0908    sodium chloride flush 0.9 % injection 5-40 mL  5-40 mL IntraVENous PRN Cathryne Khat, APRN - NP        0.9 % sodium chloride infusion  25 mL IntraVENous PRN Cathryne Khat, APRN - NP   Stopped at 01/15/22 0125    LORazepam (ATIVAN) injection 1 mg  1 mg IntraVENous Q4H PRN Frankie Leonard MD   1 mg at 01/09/22 2044    chlorhexidine (PERIDEX) 0.12 % solution 15 mL  15 mL Mouth/Throat BID PATRICE Palmer NP   15 mL at 01/26/22 0853    famotidine (PEPCID) injection 20 mg  20 mg IntraVENous BID PATRICE Palmer NP   20 mg at 01/26/22 0853    propofol injection  5-80 mcg/kg/min IntraVENous Continuous Ether MD Ron 22.7 mL/hr at 01/26/22 0850 45 mcg/kg/min at 01/26/22 0850    fentaNYL (SUBLIMAZE) 1,000 mcg in sodium chloride 0.9% 100 mL infusion  12.5-200 mcg/hr IntraVENous Continuous PATRICE Palmer NP 6.3 mL/hr at 01/25/22 2356 62.5 mcg/hr at 01/25/22 2356    albuterol sulfate  (90 Base) MCG/ACT inhaler 2 puff  2 puff Inhalation Q4H WA Frankie Leonard MD   2 puff at 01/26/22 1108    ipratropium (ATROVENT HFA) 17 MCG/ACT inhaler 2 puff  2 puff Inhalation 4x daily Frankie Leonard MD   2 puff at 01/26/22 1108    lamoTRIgine (LAMICTAL) tablet 150 mg  150 mg Oral BID Kin Walker MD   150 mg at 01/26/22 5163    topiramate (TOPAMAX) tablet 200 mg  200 mg Oral BID Kin Walker MD   200 mg at 01/26/22 0855    clonazePAM (KLONOPIN) tablet 0.5 mg  0.5 mg Oral BID PRN Kin Walker MD        [Held by provider] atorvastatin (LIPITOR) tablet 80 mg  80 mg Oral Nightly Angella Lanza MD   80 mg at 01/11/22 2151    bisacodyl (DULCOLAX) EC tablet 10 mg  10 mg Oral Daily PRN Kin Walker MD        sertraline (ZOLOFT) tablet 25 mg  25 mg Oral Daily Angella Lanza MD   25 mg at 01/26/22 0854    ascorbic acid (VITAMIN C) tablet 500 mg  500 mg Oral BID Kin Walker MD   500 mg at 01/26/22 0854    guaiFENesin (MUCINEX) extended release tablet 600 mg  600 mg Oral BID Angella Lanza MD   600 mg at 01/25/22 0935    sodium chloride flush 0.9 % injection 5-40 mL  5-40 mL IntraVENous 2 times per day Kin Walker MD   20 mL at 01/26/22 0908    sodium chloride flush 0.9 % injection 5-40 mL  5-40 mL IntraVENous PRN Angella LITTLE MD Myla        0.9 % sodium chloride infusion  25 mL IntraVENous PRN Dulce Maria Pina MD        ondansetron (ZOFRAN-ODT) disintegrating tablet 4 mg  4 mg Oral Q8H PRN Dulce Maria Pina MD        Or    ondansetron (ZOFRAN) injection 4 mg  4 mg IntraVENous Q6H PRN Angella Lanza MD        polyethylene glycol (GLYCOLAX) packet 17 g  17 g Oral Daily PRN Dulce Maria Pina MD        acetaminophen (TYLENOL) tablet 650 mg  650 mg Oral Q6H PRN Dulce Maria Pina MD   650 mg at 01/22/22 1438    Or    acetaminophen (TYLENOL) suppository 650 mg  650 mg Rectal Q6H PRN Dulce Maria Pina MD   650 mg at 01/22/22 2134    Vitamin D (CHOLECALCIFEROL) tablet 2,000 Units  2,000 Units Oral Daily Dulce Maria Pina MD   2,000 Units at 01/26/22 0910    influenza quadrivalent split vaccine (FLUZONE;FLUARIX;FLULAVAL;AFLURIA) injection 0.5 mL  0.5 mL IntraMUSCular Prior to discharge Dulce Maria Pina MD             Review of Systems:       Review of system could not be obtained as patient is intubated      Physical Examination:      Vitals:    01/26/22 1130   BP: 100/68   Pulse: 71   Resp: 24   Temp:    SpO2: 93%      Wt Readings from Last 3 Encounters:   01/17/22 189 lb 2.5 oz (85.8 kg)   01/06/22 185 lb (83.9 kg)   12/16/21 186 lb 9.6 oz (84.6 kg)     Body mass index is 25.65 kg/m². General Appearance: Sedated and intubated   Constitutional:  Well developed, Well nourished  HEENT:  Normocephalic, Atraumatic, Oropharynx moist, No oral exudates,   Nose normal. Neck Supple Carotid: no carotid bruit  Eyes:  Conjunctiva normal, No discharge. Respiratory:    Coarse breath sounds. Mechanical ventilator support  Cardiovascular: S1-S2 No murmurs auscultated. No rubs, thrills or gallops. Normal  rhythm. Pedal pulses are normal. No pedal edema  GI:  Soft Non tender, non distended. Musculoskeletal:    No cyanosis, No clubbing. Integument:  Warm, Dry, No erythema, No rash. Lymphatic:  No lymphadenopathy noted.    Neurologic: Sedated   Psychiatric:  Could not be assessed       Lab Review     Recent Labs     01/26/22  0602   WBC 17.1*   HGB 8.2*   HCT 27.0*         Recent Labs     01/25/22  2149 01/25/22  2149 01/26/22  0602      < > 140   K 4.1   < > 3.6   *   < > 110   CO2 21   < > 22   PHOS 4.2  --   --    BUN 40*   < > 41*   CREATININE 0.8*   < > 0.8*    < > = values in this interval not displayed. No results for input(s): AST, ALT, ALB, BILIDIR, BILITOT, ALKPHOS in the last 72 hours. No results for input(s): TROPONINI in the last 72 hours. No results found for: BNP  No results found for: INR, PROTIME      All labs, images, EKGs were personally reviewed      Assessment: 64 y. o.year old with PMH of  has a past medical history of HLD, Moderate episode of recurrent major depressive disorder ([facility] [address] and Seizures ([facility] [address] Medical Decision Making :       1. Paroxysmal, new onset atrial fibrillation with rapid ventricular response. 2. Currently back in normal sinus rhythm      Likely situational A. fib in the setting of COVID-19 pneumonia  Currently on IV amiodarone  Patient has transaminitis we will stop IV amiodarone  start patient on oral Cardizem  Cardizem 30 mg every 6 hours  Recent echocardiogram shows preserved LV ejection fraction  Patient is on therapeutic Lovenox, 1 mg/kg twice daily. Continue    3. Septic shock: Patient is on IV pressors, IV antibiotics  4. Essential hypertension: Hold off antihypertensive given shock  5.  Hyperlipidemia: Statins holding off due to transaminitis        Thank you for the consult    Dr. Frank Rock  1/26/2022 11:56 AM

## 2022-01-26 NOTE — PROGRESS NOTES
01/26/22 0721   Vent Information   $Ventilation $Subsequent Day   Vent Type 980   Vent Mode AC/PC   Vt Ordered 0 mL   Pressure Ordered 20   Rate Set 24 bmp   Peak Flow 0 L/min   Pressure Support 0 cmH20   FiO2  65 %   SpO2 94 %   SpO2/FiO2 ratio 144.62   Sensitivity 3   PEEP/CPAP 11   I Time/ I Time % 0 s   Humidification Source HME   Nitric Oxide/Epoprostenol In Use? No   Vent Patient Data   High Peep/I Pressure 20   Peak Inspiratory Pressure 32 cmH2O   Mean Airway Pressure 18 cmH20   Rate Measured 24 br/min   Vt Exhaled 762 mL   Minute Volume 17.6 Liters   I:E Ratio 1:2.10   Plateau Pressure 27 JID25   Static Compliance 38 mL/cmH2O   Total PEEP 12 cmH20   Auto PEEP 0.7 cmH20   Cough/Sputum   Sputum How Obtained Endotracheal;Suctioned   $Obtained Sample $Induced Sputum   Cough Non-productive   Frequency Infrequent   Sputum Amount None   Sputum Color None   Tenacity None   Spontaneous Breathing Trial (SBT) RT Doc   Pulse 66   Breath Sounds   Right Upper Lobe Diminished   Right Middle Lobe Diminished   Right Lower Lobe Diminished   Left Upper Lobe Diminished   Left Lower Lobe Diminished   Additional Respiratory  Assessments   Resp 19   Position Semi-Phelps's   Oral Care Mouth suctioned   Alarm Settings   High Pressure Alarm 45 cmH2O   Delay Alarm 20 sec(s)   Low Minute Volume Alarm 2.5 L/min   Apnea (secs) 20 secs   High Respiratory Rate 40 br/min   Low Exhaled Vt  250 mL   ETT (adult)   Placement Date/Time: 01/23/22 (c) 0930   Preoxygenation: Yes  Technique: Video laryngoscopy  Type: Cuffed  Tube Size: (c) 8 mm  Laryngoscope: Mac  Blade Size: 4  Location: Oral  Grade View: Full view of the glottis  Insertion attempts: 1  Placement Ve. ..    Secured at 24 cm   Measured From Lips   ET Placement Right   Secured By Commercial tube jain   Site Condition Dry

## 2022-01-26 NOTE — PROGRESS NOTES
I discussed with and updated patient's mom on [phone]. All her questions with addressed.     Christopher Tavares MD

## 2022-01-26 NOTE — PROGRESS NOTES
Patients ex wife asked me to contact Dr. Devon Gann about future plan of care and the possibility of transferring him to another facility. Dr. Devon Gann notified via [employer]. If Dr. Devon Gann cannot stop by the bedside the family requested him to call Narg[facility] Landry ([phone]).

## 2022-01-26 NOTE — PROGRESS NOTES
Infectious Disease Progress Note  2022   Patient Name: Gerardo Pacheco : 1965       Reason for visit: F/u COVID-19 pneumonia, acute respiratory failure? History:? Interval history noted  Intubated, sedated and on mechanical ventilation  Physical Exam:  Vital Signs: /76   Pulse 62   Temp 98.2 °F (36.8 °C) (Rectal)   Resp 24   Ht 6' 0.01\" (1.829 m)   Wt 189 lb 2.5 oz (85.8 kg)   SpO2 90%   BMI 25.65 kg/m²     Gen: intubated and sedated  Skin: no stigmata of endocarditis  Wounds: C/D/I  HEMT: AT/NC ETT OGT  Eyes: PERRLA. Neck: Supple. Trachea midline. No LAD. Chest:  transmitted breath sounds. Heart: RRR  Abd: soft, non-distended, no tenderness, no hepatomegaly. Normoactive bowel sounds. Ext: no clubbing, cyanosis, or edema  Catheter Site: without erythema or tenderness  LDA: left arm PICC , Urethral catheter;  Neuro: sedated and on the ventilator. Radiologic / Imaging / TESTING  ONE XRAY VIEW OF THE CHEST     2022 6:50 am     COMPARISON:   2022     HISTORY:   ORDERING SYSTEM PROVIDED HISTORY: intubated patient   TECHNOLOGIST PROVIDED HISTORY:   Reason for exam:->intubated patient   Reason for Exam: VENT     Follow-up exam     FINDINGS:   Endotracheal tube tip is 7.1 cm above the nicol. Multifocal airspace   opacities are not significantly changed. Lines and tubes are otherwise   stable in positioning. No pleural effusion. Stable cardiomediastinal   silhouette. No pneumothorax. Osseous structures otherwise stable. Impression:   Endotracheal tube tip is 7.1 cm above the nicol.   Otherwise no significant   interval change in bilateral pneumonia        Labs:    Recent Results (from the past 24 hour(s))   EKG 12 Lead    Collection Time: 22  5:46 PM   Result Value Ref Range    Ventricular Rate 139 BPM    Atrial Rate 125 BPM    QRS Duration 74 ms    Q-T Interval 290 ms    QTc Calculation (Bazett) 441 ms    R Axis 43 degrees    T Axis 3 degrees    Diagnosis Atrial fibrillation with rapid ventricular response  Nonspecific T wave abnormality  Abnormal ECG  When compared with ECG of 07-JAN-2022 09:46,  Atrial fibrillation has replaced Sinus rhythm  Vent.  rate has increased BY  66 BPM  Nonspecific T wave abnormality, worse in Inferior leads  T wave inversion now evident in Anterior leads  Confirmed by Gloria Appiah ([id number]) on 1/26/2022 10:56:37 AM     Critical Care Panel    Collection Time: 01/25/22  9:49 PM   Result Value Ref Range    Sodium 142 135 - 145 MMOL/L    Potassium 4.1 3.5 - 5.1 MMOL/L    Chloride 111 (H) 99 - 110 mMol/L    CO2 21 21 - 32 MMOL/L    Anion Gap 10 4 - 16    BUN 40 (H) 6 - 23 MG/DL    CREATININE 0.8 (L) 0.9 - 1.3 MG/DL    Glucose 170 (H) 70 - 99 MG/DL    Calcium 7.5 (L) 8.3 - 10.6 MG/DL    GFR Non-African American >60 >60 mL/min/1.73m2    GFR African American >60 >60 mL/min/1.73m2    Phosphorus 4.2 2.5 - 4.9 MG/DL    Magnesium 2.3 1.8 - 2.4 mg/dl   Blood gas, arterial    Collection Time: 01/26/22  6:00 AM   Result Value Ref Range    pH, Bld 7.40 7.34 - 7.45    pCO2, Arterial 36.0 32 - 45 MMHG    pO2, Arterial 76 75 - 100 MMHG    Base Excess 2 0 - 3.3    HCO3, Arterial 22.3 18 - 23 MMOL/L    CO2 Content 23.4 19 - 24 MMOL/L    O2 Sat 90.6 (L) 96 - 97 %    Carbon Monoxide, Blood 2.1 0 - 5 %    Methemoglobin, Arterial 1.0 <1.5 %    Comment PC 20, RR 24, O2 .65, PEEP 12    Basic metabolic panel    Collection Time: 01/26/22  6:02 AM   Result Value Ref Range    Sodium 140 135 - 145 MMOL/L    Potassium 3.6 3.5 - 5.1 MMOL/L    Chloride 110 99 - 110 mMol/L    CO2 22 21 - 32 MMOL/L    Anion Gap 8 4 - 16    BUN 41 (H) 6 - 23 MG/DL    CREATININE 0.8 (L) 0.9 - 1.3 MG/DL    Glucose 147 (H) 70 - 99 MG/DL    Calcium 7.6 (L) 8.3 - 10.6 MG/DL    GFR Non-African American >60 >60 mL/min/1.73m2    GFR African American >60 >60 mL/min/1.73m2   CBC auto differential    Collection Time: 01/26/22  6:02 AM   Result Value Ref Range    WBC 17.1 (H) 4.0 - 10.5 K/CU MM    RBC 2.62 (L) 4.6 - 6.2 M/CU MM    Hemoglobin 8.2 (L) 13.5 - 18.0 GM/DL    Hematocrit 27.0 (L) 42 - 52 %    .1 (H) 78 - 100 FL    MCH 31.3 (H) 27 - 31 PG    MCHC 30.4 (L) 32.0 - 36.0 %    RDW 13.2 11.7 - 14.9 %    Platelets 642 956 - 240 K/CU MM    MPV 10.1 7.5 - 11.1 FL    Myelocyte Percent 1 (H) 0.0 %    Metamyelocytes Relative 4 (H) 0.0 %    Bands Relative 14 (H) 5 - 11 %    Segs Relative 73.0 (H) 36 - 66 %    Lymphocytes % 3.0 (L) 24 - 44 %    Monocytes % 5.0 (H) 0 - 4 %    nRBC 2     Myelocytes Absolute 0.17 K/CU MM    Metamyelocytes Absolute 0.68 K/CU MM    Bands Absolute 2.39 K/CU MM    Segs Absolute 12.5 K/CU MM    Lymphocytes Absolute 0.5 K/CU MM    Monocytes Absolute 0.9 K/CU MM    Differential Type MANUAL DIFFERENTIAL     RBC Morphology OCCASIONAL     Anisocytosis 1+      CULTURE results: Invalid input(s): BLOOD CULTURE,  URINE CULTURE, SURGICAL CULTURE    Diagnosis:  Patient Active Problem List   Diagnosis    History of craniotomy    Malformation of cortical development of brain ([facility] [address]    Open wedge compression fracture of T11 vertebra with routine healing    Moderate episode of recurrent major depressive disorder (HCC)    Rhinitis, allergic    S/P placement of VNS (vagus nerve stimulation) device    Stenosis of right carotid artery    Symptomatic localization-related epilepsy (HCC)    HLD    Chronic right shoulder pain    Positive FIT (fecal immunochemical test)    Open wedge compression fracture of T12 vertebra with routine healing    Pneumonia due to COVID-19 virus    Acute respiratory failure with hypoxia (HCC)       Active Problems  Active Problems:    Pneumonia due to COVID-19 virus    Acute respiratory failure with hypoxia (HCC)  Resolved Problems:    * No resolved hospital problems. *      Impression and plan   Summary and rationale: Patient is a 64 y.o.  male with a medical hx of HLD, seizure disorder, s/p craniotomy admitted for severe COVID-19 pneumonia, intubated on 1/9/2022. He has critical COVID-19 pneumonia with hyper inflammation. Respiratory culture positive for Enterobacter cloacae   Enterobacter cloacae superimposed bacterial infection.  COVID-19 symptom onset: 12/24/2021   COVID-19 test date: 1/27/2022   Expected discontinuation of isolation precautions: 1/13/2022   Clinical status: Some improvement as FiO2 is down to 60%, he is afebrile, CRP down to 154.7   Therapeutic:  o Ongoing antibiotics: continue meropenem 1/22-, fluconazole 1/26  o Immunomodulators:   - Solu-Medrol: Pulse dosing, 3-day course of 1000 mg daily to be completed on 1/25/2022. . continue on Solu-Medrol 60 mg every 6 hours  o Completed antibiotics: Ceftriaxone, azithromycin, vancomycin 1/22-25,  o Other agents:    Diagnostic: Trend CRP   F/u:   Other: Discussed with his Ex-wife: Miriam Erwin, at the bedside, questions about antibiotics were answered.       Electronically signed by: Electronically signed by Jennie Faye MD on 1/26/2022 at 2:12 PM

## 2022-01-26 NOTE — PROGRESS NOTES
Dr. Toya Sheikh notified this RN that he contacted the family last evening and updated Yolie Mack about the current status. Dr. Toya Sheikh said he was busy until 1600 and that after 1600 he could call the family if they still wanted. This RN relayed this message to the family at the bedside. 56- Dr. Toya Sheikh notified that Yolie Mack would still like him to contact her later today.

## 2022-01-26 NOTE — PROGRESS NOTES
This RN was notified that patient O2 sat was 85%. This RN suctioned and bumped his O2 up to 100%. He is now back up at 92% O2. Respiratory notified that he is gargling and to check his cuff.

## 2022-01-26 NOTE — PROGRESS NOTES
Notified Dr. Kartik Barakat face to face about the visitors wishes for the patient to get a third antibiotic for the patient.

## 2022-01-27 NOTE — PROGRESS NOTES
01/27/22 1526   Vent Information   Equipment Changed HME   Vent Type 980   Vent Mode AC/PC   Vt Ordered 0 mL   Pressure Ordered 20   Rate Set 24 bmp   Peak Flow 0 L/min   Pressure Support 0 cmH20   FiO2  90 %   SpO2 91 %   SpO2/FiO2 ratio 101.11   Sensitivity 3   PEEP/CPAP 11   I Time/ I Time % 0 s   Humidification Source HME   Vent Patient Data   High Peep/I Pressure 20   Peak Inspiratory Pressure 32 cmH2O   Mean Airway Pressure 18 cmH20   Rate Measured 24 br/min   Vt Exhaled 665 mL   Minute Volume 16 Liters   I:E Ratio 1:2.1   Cough/Sputum   Sputum How Obtained Endotracheal;Suctioned   $Obtained Sample $Induced Sputum   Cough Non-productive   Frequency Infrequent   Sputum Amount None   Sputum Color None   Tenacity None   Breath Sounds   Right Upper Lobe Clear   Right Middle Lobe Diminished   Right Lower Lobe Diminished   Left Upper Lobe Clear   Left Lower Lobe Diminished   Additional Respiratory  Assessments   Position Semi-Phelps's   Alarm Settings   High Pressure Alarm 45 cmH2O   Delay Alarm 20 sec(s)   Low Minute Volume Alarm 2.5 L/min   Apnea (secs) 20 secs   High Respiratory Rate 40 br/min   Low Exhaled Vt  250 mL   ETT (adult)   Placement Date/Time: 01/23/22 (c) 1831   Preoxygenation: Yes  Technique: Video laryngoscopy  Type: Cuffed  Tube Size: (c) 8 mm  Laryngoscope: Mac  Blade Size: 4  Location: Oral  Grade View: Full view of the glottis  Insertion attempts: 1  Placement Ve. ..    Secured at 26 cm   Measured From Lips   ET Placement Left   Secured By Commercial tube jain   Site Condition Dry

## 2022-01-27 NOTE — PROGRESS NOTES
CARDIOLOGY PROGRESS NOTE                                                  Name:  Kaiden Ernandez /Age/Sex: 1965  (64 y.o. male)   MRN & CSN:  [id number] & [id number] Admission Date/Time: 2022  9:37 AM   Location:  -A PCP: Kusum La MD         Admit Date:  2022  Hospital Day: 21      SUBJECTIVE:   Seen patient as follow up as consultation for AFIB    Is intubated, restful    TELEMETRY: SR       Intake/Output Summary (Last 24 hours) at 2022 1146  Last data filed at 2022 1124  Gross per 24 hour   Intake 1379 ml   Output 1725 ml   Net -346 ml       Assessment/Plan:        1. Paroxysmal, new onset atrial fibrillation with rapid ventricular response. 2. Currently back in normal sinus rhythm        Likely situational A. fib in the setting of COVID-19 pneumonia  Currently on IV amiodarone  Patient has transaminitis we will stop IV amiodarone  start patient on oral Cardizem  Cont. Cardizem 30 mg every 6 hours, will switch to oral Cardiazem 120 mg daily   Echocardiogram shows preserved LV ejection fraction  Patient is on therapeutic Lovenox, 1 mg/kg twice daily. Continue     3. Septic shock: Patient is on IV pressors, IV antibiotics  4. Acute respiratory failure, patient is intubated on mechanical ventilator. 5. Essential hypertension: Hold off antihypertensive given shock  6. Hyperlipidemia: Statins holding off due to transaminitis     It was discussed with patient nursing staff and mother at bedside     Past medical history:    has a past medical history of HLD, Moderate episode of recurrent major depressive disorder ([facility] [address] and Seizures ([facility] [address] Past surgical history:   has a past surgical history that includes brain surgery. Social History:   reports that he has never smoked. He has never used smokeless tobacco. He reports that he does not drink alcohol and does not use drugs. Family history:  family history is not on file.     OBJECTIVE:     /75   Pulse 70 Temp 97.7 °F (36.5 °C) (Bladder)   Resp 25   Ht 6' 0.01\" (1.829 m)   Wt 189 lb 2.5 oz (85.8 kg)   SpO2 (!) 87%   BMI 25.65 kg/m²       Intake/Output Summary (Last 24 hours) at 1/27/2022 1146  Last data filed at 1/27/2022 1124  Gross per 24 hour   Intake 1379 ml   Output 1725 ml   Net -346 ml       Physical Exam:    General Appearance: Sedated and intubated   Constitutional:  Well developed, Well nourished  HEENT:  Normocephalic, Atraumatic, Oropharynx moist, No oral exudates,   Nose normal. Neck Supple Carotid: no carotid bruit  Eyes:  Conjunctiva normal, No discharge. Respiratory:    Coarse breath sounds. Mechanical ventilator support  Cardiovascular: S1-S2 No murmurs auscultated. No rubs, thrills or gallops. Normal  rhythm. Pedal pulses are normal. No pedal edema  GI:  Soft Non tender, non distended. Musculoskeletal:    No cyanosis, No clubbing. Integument:  Warm, Dry, No erythema, No rash. Lymphatic:  No lymphadenopathy noted.    Neurologic:  Sedated   Psychiatric:  Could not be assessed      MEDICATIONS:     insulin lispro  0-12 Units SubCUTAneous TID WC    insulin lispro  0-6 Units SubCUTAneous Nightly    methylPREDNISolone  60 mg IntraVENous Q6H    dilTIAZem  30 mg Oral 4 times per day    fluconazole  400 mg IntraVENous Q24H    pantoprazole  40 mg IntraVENous Daily    meropenem  1,000 mg IntraVENous Q8H    midodrine  10 mg Oral TID WC    cloBAZam  20 mg Oral BID    enoxaparin  1 mg/kg SubCUTAneous BID    lidocaine PF  5 mL IntraDERmal Once    sodium chloride flush  5-40 mL IntraVENous 2 times per day    chlorhexidine  15 mL Mouth/Throat BID    famotidine (PEPCID) injection  20 mg IntraVENous BID    albuterol sulfate HFA  2 puff Inhalation Q4H WA    ipratropium  2 puff Inhalation 4x daily    lamoTRIgine  150 mg Oral BID    topiramate  200 mg Oral BID    [Held by provider] atorvastatin  80 mg Oral Nightly    sertraline  25 mg Oral Daily    ascorbic acid  500 mg Oral BID    guaiFENesin  600 mg Oral BID    sodium chloride flush  5-40 mL IntraVENous 2 times per day    Vitamin D  2,000 Units Oral Daily    influenza virus vaccine  0.5 mL IntraMUSCular Prior to discharge      dextrose      sodium chloride 50 mL/hr at 01/27/22 0340    norepinephrine Stopped (01/19/22 1706)    sodium chloride Stopped (01/15/22 0125)    propofol 40 mcg/kg/min (01/27/22 0757)    fentaNYL 62.5 mcg/hr (01/27/22 1110)    sodium chloride       glucose, dextrose, glucagon (rDNA), dextrose, potassium chloride **OR** potassium alternative oral replacement **OR** potassium chloride, sodium chloride flush, sodium chloride, LORazepam, clonazePAM, bisacodyl, sodium chloride flush, sodium chloride, ondansetron **OR** ondansetron, polyethylene glycol, acetaminophen **OR** acetaminophen  No Known Allergies    Lab Data:  CBC:   Recent Labs     01/25/22  0532 01/26/22  0602 01/27/22  0524   WBC 17.4* 17.1* 19.6*   HGB 8.3* 8.2* 9.0*   HCT 27.0* 27.0* 29.4*   .1* 103.1* 103.9*    303 354     BMP:   Recent Labs     01/25/22  2149 01/26/22  0602 01/27/22  0524    140 141   K 4.1 3.6 3.9   * 110 109   CO2 21 22 22   PHOS 4.2  --   --    BUN 40* 41* 38*   CREATININE 0.8* 0.8* 0.7*         Brittnee Hull MD, MD 1/27/2022 11:46 AM

## 2022-01-27 NOTE — PROGRESS NOTES
01/27/22 1231   Vent Information   Vent Type 980   Vt Ordered 0 mL   Pressure Ordered 20   Rate Set 24 bmp   Peak Flow 0 L/min   Pressure Support 0 cmH20   FiO2  85 %   SpO2 (!) 88 %   SpO2/FiO2 ratio 103.53   Sensitivity 3   PEEP/CPAP 11   I Time/ I Time % 0 s   Vent Patient Data   High Peep/I Pressure 20   Peak Inspiratory Pressure 32 cmH2O   Mean Airway Pressure 18 cmH20   Rate Measured 24 br/min   Vt Exhaled 701 mL   Minute Volume 17.1 Liters   I:E Ratio 1:2.10   Spontaneous Breathing Trial (SBT) RT Doc   Pulse 62   Breath Sounds   Right Upper Lobe Clear   Right Middle Lobe Diminished   Right Lower Lobe Diminished   Left Upper Lobe Clear   Left Lower Lobe Diminished   Additional Respiratory  Assessments   Resp 24   Alarm Settings   High Pressure Alarm 45 cmH2O   Delay Alarm 20 sec(s)   Low Minute Volume Alarm 2.5 L/min   Apnea (secs) 20 secs   High Respiratory Rate 40 br/min   Low Exhaled Vt  250 mL   ETT (adult)   Placement Date/Time: 01/23/22 (c) 0930   Preoxygenation: Yes  Technique: Video laryngoscopy  Type: Cuffed  Tube Size: (c) 8 mm  Laryngoscope: Mac  Blade Size: 4  Location: Oral  Grade View: Full view of the glottis  Insertion attempts: 1  Placement Ve. ..    Secured at 26 cm   Measured From Lips   ET Placement Left   Secured By Commercial tube jain   Site Condition Dry

## 2022-01-27 NOTE — PROGRESS NOTES
pulmonary      SUBJECTIVE: intubated on vent     OBJECTIVE    VITALS:  /76   Pulse 60   Temp 97.7 °F (36.5 °C) (Bladder)   Resp 24   Ht 6' 0.01\" (1.829 m)   Wt 189 lb 2.5 oz (85.8 kg)   SpO2 91%   BMI 25.65 kg/m²   HEAD AND FACE EXAM:  No throat injection, no active exudate,no thrush  NECK EXAM;No JVD, no masses, symmetrical  CHEST EXAM; Expansion equal and symmetrical, no masses  LUNG EXAM; Good breath sounds bilaterally.  There are expiratory wheezes both lungs, there are crackles at both lung bases  CARDIOVASCULAR EXAM: Positive S1 and S2, no S3 or S4, no clicks ,no murmurs  RIGHT AND LEFT LOWER EXTRIMITY EXAM: No edema, no swelling, no inflamation            LABS   Lab Results   Component Value Date    WBC 19.6 (H) 01/27/2022    HGB 9.0 (L) 01/27/2022    HCT 29.4 (L) 01/27/2022    .9 (H) 01/27/2022     01/27/2022     Lab Results   Component Value Date    CREATININE 0.7 (L) 01/27/2022    BUN 38 (H) 01/27/2022     01/27/2022    K 3.9 01/27/2022     01/27/2022    CO2 22 01/27/2022     No results found for: INR, PROTIME       Lab Results   Component Value Date    PHOS 4.2 01/25/2022        Recent Labs     01/25/22  0600 01/26/22  0600 01/27/22  0600   PH 7.37 7.40 7.34   PO2ART 109* 76 65*   FCL0HBE 34.0 36.0 38.0   O2SAT 97.4* 90.6* 83.5*         Wt Readings from Last 3 Encounters:   01/17/22 189 lb 2.5 oz (85.8 kg)   01/06/22 185 lb (83.9 kg)   12/16/21 186 lb 9.6 oz (84.6 kg)          EXAMINATION:   ONE XRAY VIEW OF THE CHEST       1/27/2022 5:42 am       COMPARISON:   Chest radiograph dated January 26, 2022       HISTORY:   ORDERING SYSTEM PROVIDED HISTORY: intubated patient   TECHNOLOGIST PROVIDED HISTORY:   Reason for exam:->intubated patient   Reason for Exam: VENT       FINDINGS:   Tip of the ET tube is 4.3 cm above the nicol.  A left-sided PICC line is in   place.  The cardiomediastinal silhouette is stable.  Bibasilar parenchymal   opacities are noted without significant change. [place] Sack is no evidence of a   pneumothorax.           Impression   Bibasilar parenchymal opacities are seen without significant change             ASSESMENT  Ac resp failure  covid pneumonia  Bact pneumonia        PLAN  1. Cont full vent support  2. cpm  3. Tube feed  4.  Not weanable at present    1/27/2022  Julia Davis MD, MMARTHA.

## 2022-01-27 NOTE — PROGRESS NOTES
01/27/22 0712   Vent Information   $Ventilation $Subsequent Day   Vent Type 980   Vt Ordered 0 mL   Pressure Ordered 20   Rate Set 24 bmp   Peak Flow 0 L/min   Pressure Support 0 cmH20   FiO2  85 %   SpO2 90 %   SpO2/FiO2 ratio 105.88   Sensitivity 3   PEEP/CPAP 11   I Time/ I Time % 0 s   Humidification Source HME   Vent Patient Data   High Peep/I Pressure 20   Peak Inspiratory Pressure 31 cmH2O   Mean Airway Pressure 18 cmH20   Rate Measured 25 br/min   Vt Exhaled 688 mL   Minute Volume 16.89 Liters   I:E Ratio 1:2.10   Plateau Pressure 28 UPJ84   Static Compliance 44 mL/cmH2O   Total PEEP 12 cmH20   Auto PEEP 1.4 cmH20   Cough/Sputum   Sputum How Obtained Endotracheal;Suctioned   $Obtained Sample $Induced Sputum   Cough Non-productive   Frequency Infrequent   Sputum Amount None   Sputum Color None   Tenacity None   Spontaneous Breathing Trial (SBT) RT Doc   Pulse 64   Breath Sounds   Right Upper Lobe Clear   Right Middle Lobe Diminished   Right Lower Lobe Diminished   Left Upper Lobe Clear   Left Lower Lobe Diminished   Additional Respiratory  Assessments   Resp 23   Position Semi-Phelps's   Oral Care Mouth suctioned   Alarm Settings   High Pressure Alarm 45 cmH2O   Delay Alarm 20 sec(s)   Low Minute Volume Alarm 2.5 L/min   Apnea (secs) 20 secs   High Respiratory Rate 40 br/min   Low Exhaled Vt  250 mL   ETT (adult)   Placement Date/Time: 01/23/22 (c) 0930   Preoxygenation: Yes  Technique: Video laryngoscopy  Type: Cuffed  Tube Size: (c) 8 mm  Laryngoscope: Mac  Blade Size: 4  Location: Oral  Grade View: Full view of the glottis  Insertion attempts: 1  Placement Ve. ..    Secured at 26 cm   Measured From Lips   ET Placement Left   Secured By Commercial tube jain   Site Condition Dry

## 2022-01-27 NOTE — FLOWSHEET NOTE
Pt's ex wife at the bedside and informed that there is no visitation on the covid unit. Due to pt being out of isolation we would be allowing 30 min visitation on a daily basis and only one person at a time. They would need to coordinate the visit daily with the nurse. Ex-wife informed that she would get a half hour at this time. Pt's daughter who is the \"next of kin\" was called and informed of the visitation that would be permitted while the pt is on the covid unit.

## 2022-01-27 NOTE — PROGRESS NOTES
I discussed with [facility] through access center yesterday. They do not have ICU bed available and did not see any reason to put him on the waiting list.   They advised that family tell Dr Karina Gonsalez contact [address] [place] [address] if he feels there is a need to transfer patient to [facility]. I will communicate information to the family.     Ricky Prasad MD

## 2022-01-27 NOTE — PROGRESS NOTES
Hospitalist Progress Note      Name:  Caitlyn Wakefield /Age/Sex: 1965  (64 y.o. male)   MRN & CSN:  [id number] & [id number] Admission Date/Time: 2022  9:37 AM   Location:  [facility] PCP: Zari Cruz MD         Hospital Day:       Assessment and Plan:   Patient is 49-year-old male who was admitted for acute hypoxic kirill failure now intubated. #.  Acute hypoxic respiratory failure: Patient remains intubated on mechanical ventilation. #.  Viral pneumonia due to COVID-19  #. Suspected superimposed bacterial pneumonia  #. New onset A. fib  #. Shock likely secondary to COVID-19 and suspected superimposed bacterial pneumonia: Weaned off of Levophed  #. Transaminitis secondary to COVID-19 (Lipitor and baricitinib discontinued)  #. Acute on chronic anemia  #. Other medical problems include seizure disorder, depression, history of craniotomy    Plan:  Patient remains intubated, sedated  Continue bronchodilators and wean ventilator as tolerated. Continue methylprednisolone per ID recommendations  Continue meropenem per ID recommendations  Continue Lamictal for history of seizure disorder  Continue Cardizem through NG tube. Appreciate pulmonary, cardiologist and ID recommendations    Patient overall prognosis remains poor due to severe COVID-19 pneumonia. DVT prophylaxis: Lovenox  CODE STATUS: Total support      Subjective. :     Patient seen and examined. He remains intubated and on mechanical ventilation. Objective:   Vitals:   Vitals:    22 0712   BP:    Pulse: 64   Resp: 23   Temp:    SpO2: 90%         Physical Exam:   GEN: Intubated, sedated  NECK: Supple, no apparent thyromegaly or masses. RESP: Clear lungs to auscultation  CARDIO/VASC: Regular rate and rhythm, normal S1, S2, no murmurs  GI: Abdomen is soft, nontender, no significant organomegaly noted, bowel sounds present  MSK: No gross joint deformities.   Skin: No significant rashes, skin lesions noted  Neuro: Intubated, mechanically ventilated, sedated  Medications:   Medications:    insulin lispro  0-12 Units SubCUTAneous TID WC    insulin lispro  0-6 Units SubCUTAneous Nightly    methylPREDNISolone  60 mg IntraVENous Q6H    dilTIAZem  30 mg Oral 4 times per day    fluconazole  400 mg IntraVENous Q24H    pantoprazole  40 mg IntraVENous Daily    meropenem  1,000 mg IntraVENous Q8H    midodrine  10 mg Oral TID WC    cloBAZam  20 mg Oral BID    enoxaparin  1 mg/kg SubCUTAneous BID    lidocaine PF  5 mL IntraDERmal Once    sodium chloride flush  5-40 mL IntraVENous 2 times per day    chlorhexidine  15 mL Mouth/Throat BID    famotidine (PEPCID) injection  20 mg IntraVENous BID    albuterol sulfate HFA  2 puff Inhalation Q4H WA    ipratropium  2 puff Inhalation 4x daily    lamoTRIgine  150 mg Oral BID    topiramate  200 mg Oral BID    [Held by provider] atorvastatin  80 mg Oral Nightly    sertraline  25 mg Oral Daily    ascorbic acid  500 mg Oral BID    guaiFENesin  600 mg Oral BID    sodium chloride flush  5-40 mL IntraVENous 2 times per day    Vitamin D  2,000 Units Oral Daily    influenza virus vaccine  0.5 mL IntraMUSCular Prior to discharge      Infusions:    dextrose      sodium chloride 50 mL/hr at 01/27/22 0340    norepinephrine Stopped (01/19/22 1706)    sodium chloride Stopped (01/15/22 0125)    propofol 40 mcg/kg/min (01/27/22 0757)    fentaNYL 62.5 mcg/hr (01/26/22 1902)    sodium chloride       PRN Meds: glucose, 15 g, PRN  dextrose, 12.5 g, PRN  glucagon (rDNA), 1 mg, PRN  dextrose, 100 mL/hr, PRN  potassium chloride, 40 mEq, PRN   Or  potassium alternative oral replacement, 40 mEq, PRN   Or  potassium chloride, 10 mEq, PRN  sodium chloride flush, 5-40 mL, PRN  sodium chloride, 25 mL, PRN  LORazepam, 1 mg, Q4H PRN  clonazePAM, 0.5 mg, BID PRN  bisacodyl, 10 mg, Daily PRN  sodium chloride flush, 5-40 mL, PRN  sodium chloride, 25 mL, PRN  ondansetron, 4 mg, Q8H PRN Or  ondansetron, 4 mg, Q6H PRN  polyethylene glycol, 17 g, Daily PRN  acetaminophen, 650 mg, Q6H PRN   Or  acetaminophen, 650 mg, Q6H PRN          Electronically signed by Dave Francois MD on 1/27/2022 at 8:40 AM

## 2022-01-27 NOTE — PROGRESS NOTES
Infectious Disease Progress Note  2022   Patient Name: Yumiko Draper : 1965       Reason for visit: F/u COVID-19 pneumonia, acute respiratory failure? History:? Interval history noted  Intubated, sedated and on mechanical ventilation  Physical Exam:  Vital Signs: /69   Pulse 64   Temp 97.7 °F (36.5 °C) (Bladder)   Resp 23   Ht 6' 0.01\" (1.829 m)   Wt 189 lb 2.5 oz (85.8 kg)   SpO2 90%   BMI 25.65 kg/m²     Gen: intubated and sedated  Skin: no stigmata of endocarditis  Wounds: C/D/I  HEMT: AT/NC ETT OGT  Eyes: PERRLA. Neck: Supple. Trachea midline. No LAD. Chest:  transmitted breath sounds. Heart: RRR  Abd: soft, non-distended, no tenderness, no hepatomegaly. Normoactive bowel sounds. Ext: no clubbing, cyanosis, or edema  Catheter Site: without erythema or tenderness  LDA: left arm PICC , Urethral catheter;  Neuro: sedated and on the ventilator. Radiologic / Imaging / TESTING  ONE XRAY VIEW OF THE CHEST     2022 6:50 am     COMPARISON:   2022     HISTORY:   ORDERING SYSTEM PROVIDED HISTORY: intubated patient   TECHNOLOGIST PROVIDED HISTORY:   Reason for exam:->intubated patient   Reason for Exam: VENT     Follow-up exam     FINDINGS:   Endotracheal tube tip is 7.1 cm above the nicol. Multifocal airspace   opacities are not significantly changed. Lines and tubes are otherwise   stable in positioning. No pleural effusion. Stable cardiomediastinal   silhouette. No pneumothorax. Osseous structures otherwise stable. Impression:   Endotracheal tube tip is 7.1 cm above the nicol.   Otherwise no significant   interval change in bilateral pneumonia        Labs:    Recent Results (from the past 24 hour(s))   POCT Glucose    Collection Time: 22  4:22 PM   Result Value Ref Range    POC Glucose 138 (H) 70 - 99 MG/DL   POCT Glucose    Collection Time: 22  8:44 PM   Result Value Ref Range    POC Glucose 127 (H) 70 - 99 MG/DL   POCT Glucose    Collection Time: 01/27/22 12:47 AM   Result Value Ref Range    POC Glucose 112 (H) 70 - 99 MG/DL   D-dimer, quantitative    Collection Time: 01/27/22  5:24 AM   Result Value Ref Range    D-Dimer, Quant 1010 (H) <230 NG/mL(DDU)   Procalcitonin    Collection Time: 01/27/22  5:24 AM   Result Value Ref Range    Procalcitonin 3.470    Basic metabolic panel    Collection Time: 01/27/22  5:24 AM   Result Value Ref Range    Sodium 141 135 - 145 MMOL/L    Potassium 3.9 3.5 - 5.1 MMOL/L    Chloride 109 99 - 110 mMol/L    CO2 22 21 - 32 MMOL/L    Anion Gap 10 4 - 16    BUN 38 (H) 6 - 23 MG/DL    CREATININE 0.7 (L) 0.9 - 1.3 MG/DL    Glucose 141 (H) 70 - 99 MG/DL    Calcium 7.7 (L) 8.3 - 10.6 MG/DL    GFR Non-African American >60 >60 mL/min/1.73m2    GFR African American >60 >60 mL/min/1.73m2   CBC auto differential    Collection Time: 01/27/22  5:24 AM   Result Value Ref Range    WBC 19.6 (H) 4.0 - 10.5 K/CU MM    RBC 2.83 (L) 4.6 - 6.2 M/CU MM    Hemoglobin 9.0 (L) 13.5 - 18.0 GM/DL    Hematocrit 29.4 (L) 42 - 52 %    .9 (H) 78 - 100 FL    MCH 31.8 (H) 27 - 31 PG    MCHC 30.6 (L) 32.0 - 36.0 %    RDW 13.5 11.7 - 14.9 %    Platelets 715 639 - 061 K/CU MM    MPV 10.4 7.5 - 11.1 FL   C-Reactive Protein    Collection Time: 01/27/22  5:24 AM   Result Value Ref Range    CRP, High Sensitivity 81.1 mg/L   POCT Glucose    Collection Time: 01/27/22  5:35 AM   Result Value Ref Range    POC Glucose 129 (H) 70 - 99 MG/DL   Blood gas, arterial    Collection Time: 01/27/22  6:00 AM   Result Value Ref Range    pH, Bld 7.34 7.34 - 7.45    pCO2, Arterial 38.0 32 - 45 MMHG    pO2, Arterial 65 (L) 75 - 100 MMHG    Base Excess 5 (H) 0 - 3.3    HCO3, Arterial 20.5 18 - 23 MMOL/L    CO2 Content 21.7 19 - 24 MMOL/L    O2 Sat 83.5 (L) 96 - 97 %    Carbon Monoxide, Blood 1.8 0 - 5 %    Methemoglobin, Arterial 1.2 <1.5 %    Comment PC20 R24 +11 85%    POCT Glucose    Collection Time: 01/27/22  7:59 AM   Result Value Ref Range    POC Glucose 148 (H) 70 - 99 MG/DL CULTURE results: Invalid input(s): BLOOD CULTURE,  URINE CULTURE, SURGICAL CULTURE    Diagnosis:  Patient Active Problem List   Diagnosis    History of craniotomy    Malformation of cortical development of brain ([facility] [address]    Open wedge compression fracture of T11 vertebra with routine healing    Moderate episode of recurrent major depressive disorder (HCC)    Rhinitis, allergic    S/P placement of VNS (vagus nerve stimulation) device    Stenosis of right carotid artery    Symptomatic localization-related epilepsy (HCC)    HLD    Chronic right shoulder pain    Positive FIT (fecal immunochemical test)    Open wedge compression fracture of T12 vertebra with routine healing    Pneumonia due to COVID-19 virus    Acute respiratory failure with hypoxia (HCC)       Active Problems  Active Problems:    Pneumonia due to COVID-19 virus    Acute respiratory failure with hypoxia (HCC)  Resolved Problems:    * No resolved hospital problems. *      Impression and plan   Summary and rationale: Patient is a 64 y.o.  male with a medical hx of HLD, seizure disorder, s/p craniotomy admitted for severe COVID-19 pneumonia, intubated on 1/9/2022. He has critical COVID-19 pneumonia with hyper inflammation. Respiratory culture positive for Enterobacter cloacae   Enterobacter cloacae superimposed bacterial infection.  COVID-19 symptom onset: 12/24/2021   COVID-19 test date: 1/27/2022   Expected discontinuation of isolation precautions: 1/13/2022   Clinical status: Some improvement as FiO2 is down to 90%, he is afebrile, CRP down to 81.1   Therapeutic:  o Ongoing antibiotics: continue meropenem 1/22-, fluconazole 1/26  o Immunomodulators:   - Solu-Medrol: Pulse dosing, 3-day course of 1000 mg daily to be completed on 1/25/2022. . continue on Solu-Medrol 60 mg every 6 hours  o Completed antibiotics: Ceftriaxone, azithromycin, vancomycin 1/22-25,  o Other agents:    Diagnostic: Trend CRP   F/u:   Other: Discussed with his Ex-wife: Kemi Sunshine, at the bedside, questions about antibiotics were answered.       Electronically signed by: Electronically signed by Kaushik Leiva MD on 1/27/2022 at 9:12 AM

## 2022-01-28 NOTE — PROGRESS NOTES
CARDIOLOGY PROGRESS NOTE                                                  Name:  Taz Vilchis /Age/Sex: 1965  (64 y.o. male)   MRN & CSN:  [id number] & [id number] Admission Date/Time: 2022  9:37 AM   Location:  [facility] PCP: Stephanie Howell MD         Admit Date:  2022  Hospital Day: 25      SUBJECTIVE:   Seen patient as follow up as consultation for AFIB    Is intubated, restful    TELEMETRY: SR       Intake/Output Summary (Last 24 hours) at 2022 8059  Last data filed at 2022 0800  Gross per 24 hour   Intake 8156.03 ml   Output 2150 ml   Net 6006.03 ml       Assessment/Plan:        1. Paroxysmal, new onset atrial fibrillation with rapid ventricular response. 2. Currently back in normal sinus rhythm        Likely situational A. fib in the setting of COVID-19 pneumonia    Patient has transaminitis - stop IV amiodarone  Long-acting Cardizem could not crush through NG tube  Continue with short acting Cardizem 30 mg every 6 hours  Echocardiogram shows preserved LV ejection fraction  Patient is on therapeutic Lovenox, 1 mg/kg twice daily. Continue     3. Septic shock: Patient is on IV pressors, IV antibiotics  4. Acute respiratory failure, patient is intubated on mechanical ventilator. 5. Essential hypertension: Hold off antihypertensive given shock  6. Hyperlipidemia: Statins holding off due to transaminitis        Past medical history:    has a past medical history of HLD, Moderate episode of recurrent major depressive disorder ([facility] [address] and Seizures ([facility] [address] Past surgical history:   has a past surgical history that includes brain surgery. Social History:   reports that he has never smoked. He has never used smokeless tobacco. He reports that he does not drink alcohol and does not use drugs. Family history:  family history is not on file.     OBJECTIVE:     /81   Pulse 69   Temp 97.3 °F (36.3 °C) (Rectal)   Resp 26   Ht 6' 0.01\" (1.829 m)   Wt 189 lb 2.5 oz (85.8 kg) SpO2 90%   BMI 25.65 kg/m²       Intake/Output Summary (Last 24 hours) at 1/28/2022 1244  Last data filed at 1/28/2022 0800  Gross per 24 hour   Intake 8156.03 ml   Output 2150 ml   Net 6006.03 ml       Physical Exam:    General Appearance: Sedated and intubated   Constitutional:  Well developed, Well nourished  HEENT:  Normocephalic, Atraumatic, Oropharynx moist, No oral exudates,   Nose normal. Neck Supple Carotid: no carotid bruit  Eyes:  Conjunctiva normal, No discharge. Respiratory:    Coarse breath sounds. Mechanical ventilator support  Cardiovascular: S1-S2 No murmurs auscultated. No rubs, thrills or gallops. Normal  rhythm. Pedal pulses are normal. No pedal edema  GI:  Soft Non tender, non distended. Musculoskeletal:    No cyanosis, No clubbing. Integument:  Warm, Dry, No erythema, No rash. Lymphatic:  No lymphadenopathy noted.    Neurologic:  Sedated   Psychiatric:  Could not be assessed      MEDICATIONS:     insulin lispro  0-12 Units SubCUTAneous TID WC    insulin lispro  0-6 Units SubCUTAneous Nightly    methylPREDNISolone  60 mg IntraVENous Q6H    dilTIAZem  30 mg Oral 4 times per day    fluconazole  400 mg IntraVENous Q24H    pantoprazole  40 mg IntraVENous Daily    meropenem  1,000 mg IntraVENous Q8H    midodrine  10 mg Oral TID WC    cloBAZam  20 mg Oral BID    enoxaparin  1 mg/kg SubCUTAneous BID    lidocaine PF  5 mL IntraDERmal Once    sodium chloride flush  5-40 mL IntraVENous 2 times per day    chlorhexidine  15 mL Mouth/Throat BID    famotidine (PEPCID) injection  20 mg IntraVENous BID    albuterol sulfate HFA  2 puff Inhalation Q4H WA    ipratropium  2 puff Inhalation 4x daily    lamoTRIgine  150 mg Oral BID    topiramate  200 mg Oral BID    [Held by provider] atorvastatin  80 mg Oral Nightly    sertraline  25 mg Oral Daily    ascorbic acid  500 mg Oral BID    guaiFENesin  600 mg Oral BID    sodium chloride flush  5-40 mL IntraVENous 2 times per day    Vitamin D  2,000 Units Oral Daily    influenza virus vaccine  0.5 mL IntraMUSCular Prior to discharge      dextrose      sodium chloride 50 mL/hr at 01/28/22 0130    norepinephrine Stopped (01/19/22 1706)    sodium chloride Stopped (01/15/22 0125)    propofol 35 mcg/kg/min (01/28/22 0547)    fentaNYL 62.5 mcg/hr (01/28/22 0418)    sodium chloride       glucose, dextrose, glucagon (rDNA), dextrose, potassium chloride **OR** potassium alternative oral replacement **OR** potassium chloride, sodium chloride flush, sodium chloride, LORazepam, clonazePAM, bisacodyl, sodium chloride flush, sodium chloride, ondansetron **OR** ondansetron, polyethylene glycol, acetaminophen **OR** acetaminophen  No Known Allergies    Lab Data:  CBC:   Recent Labs     01/26/22  0602 01/27/22  0524 01/28/22  0515   WBC 17.1* 19.6* 22.3*   HGB 8.2* 9.0* 9.5*   HCT 27.0* 29.4* 31.1*   .1* 103.9* 102.3*    354 380     BMP:   Recent Labs     01/25/22  2149 01/25/22  2149 01/26/22  0602 01/27/22  0524 01/28/22  0515      < > 140 141 139   K 4.1   < > 3.6 3.9 3.8   *   < > 110 109 106   CO2 21   < > 22 22 22   PHOS 4.2  --   --   --   --    BUN 40*   < > 41* 38* 36*   CREATININE 0.8*   < > 0.8* 0.7* 0.6*    < > = values in this interval not displayed.          Love Duffy MD, MD 1/28/2022 9:22 AM

## 2022-01-28 NOTE — PROGRESS NOTES
01/28/22 1107   Vent Information   $Ventilation $Subsequent Day   Vent Type 980   Vent Mode AC/PC   Vt Ordered 0 mL   Rate Set 24 bmp   Peak Flow 0 L/min   Pressure Support 0 cmH20   FiO2  90 %   SpO2 91 %   SpO2/FiO2 ratio 101.11   Sensitivity 3   PEEP/CPAP 11   I Time/ I Time % 0 s   Vent Patient Data   High Peep/I Pressure 20   Peak Inspiratory Pressure 32 cmH2O   Mean Airway Pressure 18 cmH20   Rate Measured 24 br/min   Vt Exhaled 672 mL   Minute Volume 16.1 Liters   I:E Ratio 1:2.10   Plateau Pressure 28 YSO24   Static Compliance 40 mL/cmH2O   Total PEEP 12 cmH20   Auto PEEP 0.8 cmH20   Cough/Sputum   Sputum How Obtained Endotracheal;Suctioned   $Obtained Sample $Induced Sputum   Cough Productive   Sputum Amount Small   Sputum Color Cloudy;Creamy   Tenacity Thick   Spontaneous Breathing Trial (SBT) RT Doc   Pulse 64   Breath Sounds   Right Upper Lobe Diminished;Rhonchi   Right Middle Lobe Diminished   Right Lower Lobe Diminished   Left Upper Lobe Diminished;Rhonchi   Left Lower Lobe Diminished   Additional Respiratory  Assessments   Resp 24   Alarm Settings   High Pressure Alarm 45 cmH2O   Low Minute Volume Alarm 2.5 L/min   Apnea (secs) 20 secs   High Respiratory Rate 40 br/min   Low Exhaled Vt  250 mL   ETT (adult)   Placement Date/Time: 01/23/22 (c) 0930   Preoxygenation: Yes  Technique: Video laryngoscopy  Type: Cuffed  Tube Size: (c) 8 mm  Laryngoscope: Mac  Blade Size: 4  Location: Oral  Grade View: Full view of the glottis  Insertion attempts: 1  Placement Ve. ..    Secured at 26 cm   Measured From [address] [place] [address],[address] By Commercial tube jain

## 2022-01-28 NOTE — PROGRESS NOTES
Comprehensive Nutrition Assessment    Type and Reason for Visit:  Reassess    Nutrition Recommendations/Plan:   · Continue EN as ordered    Nutrition Assessment:  Pt is now at goal at 60 mL/hr and appears to be tolerating the treatment. Will continue EN as ordered until the pt is able to be extubated    Malnutrition Assessment:  Malnutrition Status:  Insufficient data    Context:  Acute Illness       Estimated Daily Nutrient Needs:  Energy (kcal):  2057 ([facility] 2003b); Weight Used for Energy Requirements:  Current     Protein (g):  101-167 (1.2-2 g/kg IBW); Weight Used for Protein Requirements:  Ideal        Fluid (ml/day):  2057 or per MD; Method Used for Fluid Requirements:  1 ml/kcal       Wounds:  None       Current Nutrition Therapies:    Current Tube Feeding (TF) Orders:  · Feeding Route: Orogastric  · Formula: Peptide Based High Protein  · Schedule: Continuous (60 ml/hr per flowsheet)  · Current TF & Flush Orders Provides: 1080 kcal and 94 g protein    Anthropometric Measures:  · Height: 6' 0.01\" (182.9 cm)  · Current Body Weight: 189 lb 2.5 oz (85.8 kg)   · Admission Body Weight: 184 lb 15.5 oz (83.9 kg)    · Usual Body Weight: 188 lb 6.4 oz (85.5 kg) (10/21/21)     · Ideal Body Weight: 178 lbs; % Ideal Body Weight 106.3 %   · BMI: 25.6  · BMI Categories: Overweight (BMI 25.0-29. 9)       Nutrition Diagnosis:   · Inadequate oral intake related to acute injury/trauma as evidenced by NPO or clear liquid status due to medical condition      Nutrition Interventions:   Food and/or Nutrient Delivery:  Modify Tube Feeding  Nutrition Education/Counseling:  No recommendation at this time   Coordination of Nutrition Care:  Continue to monitor while inpatient    Goals:  Pt will meet greater than 75% of estimated nutrient needs via EN       Nutrition Monitoring and Evaluation:   Behavioral-Environmental Outcomes:  None Identified   Food/Nutrient Intake Outcomes:  Enteral Nutrition Intake/Tolerance  Physical Signs/Symptoms Outcomes:  Biochemical Data,GI Status,Hemodynamic Status,Fluid Status or Edema,Weight,Skin     Discharge Planning:     Too soon to determine     Electronically signed by Milad Mesa RD, [place] on 4/46/13 at 6:25 PM EST    Contact: [phone]

## 2022-01-28 NOTE — PROGRESS NOTES
pulmonary      SUBJECTIVE: intubated on vent     OBJECTIVE    VITALS:  /81   Pulse 69   Temp 97.3 °F (36.3 °C) (Rectal)   Resp 26   Ht 6' 0.01\" (1.829 m)   Wt 189 lb 2.5 oz (85.8 kg)   SpO2 90%   BMI 25.65 kg/m²   HEAD AND FACE EXAM:  No throat injection, no active exudate,no thrush  NECK EXAM;No JVD, no masses, symmetrical  CHEST EXAM; Expansion equal and symmetrical, no masses  LUNG EXAM; Good breath sounds bilaterally.  There are expiratory wheezes both lungs, there are crackles at both lung bases  CARDIOVASCULAR EXAM: Positive S1 and S2, no S3 or S4, no clicks ,no murmurs  RIGHT AND LEFT LOWER EXTRIMITY EXAM: No edema, no swelling, no inflamation            LABS   Lab Results   Component Value Date    WBC 22.3 (H) 01/28/2022    HGB 9.5 (L) 01/28/2022    HCT 31.1 (L) 01/28/2022    .3 (H) 01/28/2022     01/28/2022     Lab Results   Component Value Date    CREATININE 0.6 (L) 01/28/2022    BUN 36 (H) 01/28/2022     01/28/2022    K 3.8 01/28/2022     01/28/2022    CO2 22 01/28/2022     No results found for: INR, PROTIME       Lab Results   Component Value Date    PHOS 4.2 01/25/2022        Recent Labs     01/26/22  0600 01/27/22  0600 01/28/22  0600   PH 7.40 7.34 7.29*   PO2ART 76 65* 46*   DCH3ZOF 36.0 38.0 44.0   O2SAT 90.6* 83.5* 77.2*         Wt Readings from Last 3 Encounters:   01/17/22 189 lb 2.5 oz (85.8 kg)   01/06/22 185 lb (83.9 kg)   12/16/21 186 lb 9.6 oz (84.6 kg)          EXAMINATION:   ONE XRAY VIEW OF THE CHEST       1/28/2022 6:16 am       COMPARISON:   Chest radiograph dated January 27, 2022       HISTORY:   ORDERING SYSTEM PROVIDED HISTORY: intubated patient   TECHNOLOGIST PROVIDED HISTORY:   Reason for exam:->intubated patient   Reason for Exam: ON VENT       FINDINGS:   The ET tube is 4.6 cm above the nicol.  The enteric tube extends below the   diaphragm.  A left-sided PICC line is in place.  The cardiomediastinal   silhouette is stable.  Bilateral airspace opacities are seen with low lung   volumes.  There is no definite pneumothorax.  There is no significant change.           Impression   Bilateral airspace opacities are seen without significant change.                 ASSESMENT  Ac resp failure  covid pneumonia        PLAN  1. cpm  2. Cont full vent support  3. Tube feed  4.  He is not weanable at present    1/28/2022  Mattie Newsome MD, M.D.

## 2022-01-28 NOTE — PLAN OF CARE
Problem: Falls - Risk of:  Goal: Will remain free from falls  Description: Will remain free from falls  1/28/2022 0014 by Luisa Mosley RN  Outcome: Met This Shift  1/27/2022 1843 by Rex Zamora RN  Outcome: Ongoing  Goal: Absence of physical injury  Description: Absence of physical injury  1/28/2022 0014 by Luisa Mosley RN  Outcome: Met This Shift  1/27/2022 1843 by Rex Zamora RN  Outcome: Ongoing     Problem: Skin Integrity:  Goal: Will show no infection signs and symptoms  Description: Will show no infection signs and symptoms  1/28/2022 0014 by Luisa Mosley RN  Outcome: Ongoing  1/27/2022 1843 by Rex Zamora RN  Outcome: Ongoing  Goal: Absence of new skin breakdown  Description: Absence of new skin breakdown  1/28/2022 0014 by Luisa Mosley RN  Outcome: Ongoing  1/27/2022 1843 by Rex Zamora RN  Outcome: Ongoing     Problem: Airway Clearance - Ineffective  Goal: Achieve or maintain patent airway  1/28/2022 0014 by Luisa Mosley RN  Outcome: Ongoing  1/27/2022 1843 by Rex Zamora RN  Outcome: Ongoing     Problem: Gas Exchange - Impaired  Goal: Absence of hypoxia  1/28/2022 0014 by Luisa Mosley RN  Outcome: Ongoing  1/27/2022 1843 by Rex Zamora RN  Outcome: Ongoing  Goal: Promote optimal lung function  1/28/2022 0014 by Luisa Mosley RN  Outcome: Ongoing  1/27/2022 1843 by Rex Zamora RN  Outcome: Ongoing     Problem: Breathing Pattern - Ineffective  Goal: Ability to achieve and maintain a regular respiratory rate  1/28/2022 0014 by Luisa Mosley RN  Outcome: Not Met This Shift  1/27/2022 1843 by Rex Zamora RN  Outcome: Ongoing     Problem:  Body Temperature -  Risk of, Imbalanced  Goal: Ability to maintain a body temperature within defined limits  1/28/2022 0014 by Luisa Mosley RN  Outcome: Ongoing  1/27/2022 1843 by Rex Zamora RN  Outcome: Ongoing  Goal: Will regain or maintain usual level of consciousness  1/28/2022 0014 by Luisa Mosley RN  Outcome: Not Met This Shift  1/27/2022 1843 by Paula Pickard RN  Outcome: Ongoing  Goal: Complications related to the disease process, condition or treatment will be avoided or minimized  1/28/2022 0014 by Susy Browne RN  Outcome: Ongoing  1/27/2022 1843 by Paula Pickard RN  Outcome: Ongoing     Problem: Isolation Precautions - Risk of Spread of Infection  Goal: Prevent transmission of infection  1/28/2022 0014 by Susy Browne RN  Outcome: Met This Shift  1/27/2022 1843 by Paula Pickard RN  Outcome: Ongoing     Problem: Nutrition Deficits  Goal: Optimize nutritional status  1/28/2022 0014 by Suys Browne RN  Outcome: Ongoing  1/27/2022 1843 by Paula Pickard RN  Outcome: Ongoing     Problem: Risk for Fluid Volume Deficit  Goal: Maintain normal heart rhythm  1/28/2022 0014 by Susy Browne RN  Outcome: Met This Shift  1/27/2022 1843 by Paula Pickard RN  Outcome: Ongoing  Goal: Maintain absence of muscle cramping  1/28/2022 0014 by Susy Browne RN  Outcome: Ongoing  1/27/2022 1843 by Paula Pickard RN  Outcome: Ongoing  Goal: Maintain normal serum potassium, sodium, calcium, phosphorus, and pH  1/28/2022 0014 by Susy Browne RN  Outcome: Ongoing  1/27/2022 1843 by Paula Pickard RN  Outcome: Ongoing     Problem: Loneliness or Risk for Loneliness  Goal: Demonstrate positive use of time alone when socialization is not possible  1/28/2022 0014 by Susy Browne RN  Outcome: Not Met This Shift  1/27/2022 1843 by Paula Pickard RN  Outcome: Ongoing     Problem: Fatigue  Goal: Verbalize increase energy and improved vitality  1/28/2022 0014 by Susy Browne RN  Outcome: Not Met This Shift  1/27/2022 1843 by Paula Pickard RN  Outcome: Ongoing     Problem: Patient Education: Go to Patient Education Activity  Goal: Patient/Family Education  1/28/2022 0014 by Susy Browne RN  Outcome: Ongoing  1/27/2022 1843 by Paula Pickard RN  Outcome: Ongoing     Problem: Nutrition  Goal: Optimal nutrition therapy  1/28/2022 0014 by Lia Spencer RN  Outcome: Ongoing  1/27/2022 1843 by Erika Parker RN  Outcome: Ongoing     Problem: Pain:  Goal: Pain level will decrease  Description: Pain level will decrease  1/28/2022 0014 by Lia Spencer RN  Outcome: Ongoing  1/27/2022 1843 by Erika Parker RN  Outcome: Ongoing  Goal: Control of acute pain  Description: Control of acute pain  1/28/2022 0014 by Lia Spencer RN  Outcome: Ongoing  1/27/2022 1843 by Erika Parker RN  Outcome: Ongoing  Goal: Control of chronic pain  Description: Control of chronic pain  1/28/2022 0014 by Lia Spencer RN  Outcome: Ongoing  1/27/2022 1843 by Erika Parker RN  Outcome: Ongoing

## 2022-01-28 NOTE — PROGRESS NOTES
Hospitalist Progress Note      Name:  Merle Kam /Age/Sex: 1965  (64 y.o. male)   MRN & CSN:  [id number] & [id number] Admission Date/Time: 2022  9:37 AM   Location:  [facility] PCP: Patricia Alcala MD         Hospital Day:       Assessment and Plan:   Patient is 49-year-old male who was admitted for acute hypoxic respiratory failure. Patient remains intubated. #.  Acute hypoxic respiratory failure: Patient remains intubated on mechanical ventilation. #.  Viral pneumonia due to COVID-19  #. Suspected superimposed bacterial pneumonia  #. New onset A. fib  #. Shock likely secondary to COVID-19 and suspected superimposed bacterial pneumonia: Weaned off of Levophed  #. Transaminitis secondary to COVID-19 (Lipitor and baricitinib discontinued)  #. Acute on chronic anemia  #. Other medical problems include seizure disorder, depression, history of craniotomy    Plan:  Patient remains intubated, sedated  Continue bronchodilators and wean ventilator as tolerated. Continue methylprednisolone per ID recommendations  Continue meropenem per ID recommendations  Continue Lamictal for history of seizure disorder  Continue Cardizem through NG tube. Appreciate pulmonary, cardiologist and ID recommendations    Patient overall prognosis remains poor due to severe COVID-19 pneumonia. DVT prophylaxis: Lovenox  CODE STATUS: Total support      Subjective. :     Patient seen and examined. He remains intubated and on mechanical ventilation. Objective:   Vitals:   Vitals:    22 1015   BP: 99/70   Pulse: 62   Resp: 24   Temp:    SpO2:          Physical Exam:   GEN: Intubated, sedated  NECK: Supple, no apparent thyromegaly or masses. RESP: Clear lungs to auscultation  CARDIO/VASC: Regular rate and rhythm, normal S1, S2, no murmurs  GI: Abdomen is soft, nontender, no significant organomegaly noted, bowel sounds present  MSK: No gross joint deformities.   Skin: No significant rashes, skin lesions noted  Neuro: Intubated, mechanically ventilated, sedated  Medications:   Medications:    [Held by provider] dilTIAZem  120 mg Oral Daily    dilTIAZem  30 mg Oral 4 times per day    insulin lispro  0-12 Units SubCUTAneous TID WC    insulin lispro  0-6 Units SubCUTAneous Nightly    methylPREDNISolone  60 mg IntraVENous Q6H    fluconazole  400 mg IntraVENous Q24H    pantoprazole  40 mg IntraVENous Daily    meropenem  1,000 mg IntraVENous Q8H    midodrine  10 mg Oral TID WC    cloBAZam  20 mg Oral BID    enoxaparin  1 mg/kg SubCUTAneous BID    lidocaine PF  5 mL IntraDERmal Once    sodium chloride flush  5-40 mL IntraVENous 2 times per day    chlorhexidine  15 mL Mouth/Throat BID    famotidine (PEPCID) injection  20 mg IntraVENous BID    albuterol sulfate HFA  2 puff Inhalation Q4H WA    ipratropium  2 puff Inhalation 4x daily    lamoTRIgine  150 mg Oral BID    topiramate  200 mg Oral BID    [Held by provider] atorvastatin  80 mg Oral Nightly    sertraline  25 mg Oral Daily    ascorbic acid  500 mg Oral BID    guaiFENesin  600 mg Oral BID    sodium chloride flush  5-40 mL IntraVENous 2 times per day    Vitamin D  2,000 Units Oral Daily    influenza virus vaccine  0.5 mL IntraMUSCular Prior to discharge      Infusions:    dextrose      sodium chloride 50 mL/hr at 01/28/22 0130    norepinephrine Stopped (01/19/22 1706)    sodium chloride Stopped (01/15/22 0125)    propofol 35 mcg/kg/min (01/28/22 0547)    fentaNYL 62.5 mcg/hr (01/28/22 0418)    sodium chloride       PRN Meds: glucose, 15 g, PRN  dextrose, 12.5 g, PRN  glucagon (rDNA), 1 mg, PRN  dextrose, 100 mL/hr, PRN  potassium chloride, 40 mEq, PRN   Or  potassium alternative oral replacement, 40 mEq, PRN   Or  potassium chloride, 10 mEq, PRN  sodium chloride flush, 5-40 mL, PRN  sodium chloride, 25 mL, PRN  LORazepam, 1 mg, Q4H PRN  clonazePAM, 0.5 mg, BID PRN  bisacodyl, 10 mg, Daily PRN  sodium chloride flush, 5-40 mL, PRN  sodium chloride, 25 mL, PRN  ondansetron, 4 mg, Q8H PRN   Or  ondansetron, 4 mg, Q6H PRN  polyethylene glycol, 17 g, Daily PRN  acetaminophen, 650 mg, Q6H PRN   Or  acetaminophen, 650 mg, Q6H PRN          Electronically signed by Dave Francois MD on 1/28/2022 at 10:22 AM

## 2022-01-29 NOTE — PROGRESS NOTES
01/29/22 1542   Vent Information   Equipment Changed Suction catheter   Vent Mode AC/PC   Vt Ordered 0 mL   Rate Set 24 bmp   Peak Flow 0 L/min   Pressure Support 0 cmH20   FiO2  90 %   SpO2 90 %   SpO2/FiO2 ratio 100   Sensitivity 3   PEEP/CPAP 11   I Time/ I Time % 0 s   Vent Patient Data   High Peep/I Pressure 20   Peak Inspiratory Pressure 32 cmH2O   Mean Airway Pressure 18 cmH20   Rate Measured 24 br/min   Vt Exhaled 679 mL   Minute Volume 16.3 Liters   I:E Ratio 1:2.10   Spontaneous Breathing Trial (SBT) RT Doc   Pulse 70   Alarm Settings   High Pressure Alarm 45 cmH2O

## 2022-01-29 NOTE — PROGRESS NOTES
CARDIOLOGY PROGRESS NOTE                                                  Name:  Mariann Davis /Age/Sex: 1965  (64 y.o. male)   MRN & CSN:  [id number] & [id number] Admission Date/Time: 2022  9:37 AM   Location:  [facility] PCP: Mi Coley MD         Admit Date:  2022  Hospital Day: 21      SUBJECTIVE:   Seen patient as follow up as consultation for AFIB    Is intubated, restful    TELEMETRY: SR       Intake/Output Summary (Last 24 hours) at 2022 1246  Last data filed at 2022 0350  Gross per 24 hour   Intake 506 ml   Output 1450 ml   Net -944 ml       Assessment/Plan:        1. Paroxysmal, new onset atrial fibrillation with rapid ventricular response. 2. Currently back in normal sinus rhythm        Likely situational A. fib in the setting of COVID-19 pneumonia    Patient has transaminitis - stop IV amiodarone  Continue with short acting Cardizem 30 mg every 6 hours  Echocardiogram shows preserved LV ejection fraction  Patient is on therapeutic Lovenox, 1 mg/kg twice daily. Continue     3. Septic shock: Patient is on IV pressors, IV antibiotics  4. Acute respiratory failure, patient is intubated on mechanical ventilator. 5. Essential hypertension: Hold off antihypertensive given shock  6. Hyperlipidemia: Statins holding off due to transaminitis     Please call us with question      Past medical history:    has a past medical history of HLD, Moderate episode of recurrent major depressive disorder ([facility] [address] and Seizures ([facility] [address] Past surgical history:   has a past surgical history that includes brain surgery. Social History:   reports that he has never smoked. He has never used smokeless tobacco. He reports that he does not drink alcohol and does not use drugs. Family history:  family history is not on file.     OBJECTIVE:     BP (!) 141/88   Pulse 84   Temp 97.8 °F (36.6 °C) (Rectal)   Resp 24   Ht 6' (1.829 m)   Wt 223 lb 1.7 oz (101.2 kg)   SpO2 92%   BMI 30.26 kg/m² Intake/Output Summary (Last 24 hours) at 1/29/2022 1246  Last data filed at 1/29/2022 0350  Gross per 24 hour   Intake 506 ml   Output 1450 ml   Net -944 ml       Physical Exam:    General Appearance: Sedated and intubated   Constitutional:  Well developed, Well nourished  HEENT:  Normocephalic, Atraumatic, Oropharynx moist, No oral exudates,   Nose normal. Neck Supple Carotid: no carotid bruit  Eyes:  Conjunctiva normal, No discharge. Respiratory:    Coarse breath sounds. Mechanical ventilator support  Cardiovascular: S1-S2 No murmurs auscultated. No rubs, thrills or gallops. Normal  rhythm. Pedal pulses are normal. No pedal edema  GI:  Soft Non tender, non distended. Musculoskeletal:    No cyanosis, No clubbing. Integument:  Warm, Dry, No erythema, No rash. Lymphatic:  No lymphadenopathy noted.    Neurologic:  Sedated   Psychiatric:  Could not be assessed      MEDICATIONS:     enoxaparin  1 mg/kg SubCUTAneous BID    dilTIAZem  30 mg Oral 4 times per day    insulin lispro  0-12 Units SubCUTAneous TID WC    insulin lispro  0-6 Units SubCUTAneous Nightly    methylPREDNISolone  60 mg IntraVENous Q6H    fluconazole  400 mg IntraVENous Q24H    pantoprazole  40 mg IntraVENous Daily    meropenem  1,000 mg IntraVENous Q8H    midodrine  10 mg Oral TID WC    cloBAZam  20 mg Oral BID    lidocaine PF  5 mL IntraDERmal Once    sodium chloride flush  5-40 mL IntraVENous 2 times per day    chlorhexidine  15 mL Mouth/Throat BID    famotidine (PEPCID) injection  20 mg IntraVENous BID    albuterol sulfate HFA  2 puff Inhalation Q4H WA    ipratropium  2 puff Inhalation 4x daily    lamoTRIgine  150 mg Oral BID    topiramate  200 mg Oral BID    [Held by provider] atorvastatin  80 mg Oral Nightly    sertraline  25 mg Oral Daily    ascorbic acid  500 mg Oral BID    guaiFENesin  600 mg Oral BID    sodium chloride flush  5-40 mL IntraVENous 2 times per day    Vitamin D  2,000 Units Oral Daily    influenza virus vaccine  0.5 mL IntraMUSCular Prior to discharge      dextrose      sodium chloride Stopped (01/29/22 0839)    norepinephrine Stopped (01/19/22 1706)    sodium chloride Stopped (01/15/22 0125)    propofol 35 mcg/kg/min (01/29/22 0627)    fentaNYL 62.5 mcg/hr (01/28/22 2302)    sodium chloride       glucose, dextrose, glucagon (rDNA), dextrose, potassium chloride **OR** potassium alternative oral replacement **OR** potassium chloride, sodium chloride flush, sodium chloride, LORazepam, clonazePAM, bisacodyl, sodium chloride flush, sodium chloride, ondansetron **OR** ondansetron, polyethylene glycol, acetaminophen **OR** acetaminophen  No Known Allergies    Lab Data:  CBC:   Recent Labs     01/27/22 0524 01/28/22  0515 01/29/22 0822   WBC 19.6* 22.3* 21.8*   HGB 9.0* 9.5* 9.0*   HCT 29.4* 31.1* 30.2*   .9* 102.3* 104.5*    380 392     BMP:   Recent Labs     01/27/22  0524 01/28/22  0515 01/29/22 0822    139 141   K 3.9 3.8 4.1    106 110   CO2 22 22 24   BUN 38* 36* 34*   CREATININE 0.7* 0.6* 0.6*         Tristan Chan MD, MD 1/29/2022 12:46 PM

## 2022-01-29 NOTE — PROGRESS NOTES
Hospitalist Progress Note      Name:  Orlando Colby /Age/Sex: 1965  (64 y.o. male)   MRN & CSN:  [id number] & [id number] Admission Date/Time: 2022  9:37 AM   Location:  [facility] PCP: Chelsi Rodriguez MD         Hospital Day: 23      Assessment and Plan:   Patient is 59-year-old male who was admitted for acute hypoxic respiratory failure. Patient remains intubated. #.  Acute hypoxic respiratory failure: Patient remains intubated on mechanical ventilation. #.  Viral pneumonia due to COVID-19  #. Suspected superimposed bacterial pneumonia  #. New onset A. fib  #. Shock likely secondary to COVID-19 and suspected superimposed bacterial pneumonia: Weaned off of Levophed  #. Transaminitis secondary to COVID-19 (Lipitor and baricitinib discontinued)  #. Acute on chronic anemia  #. Other medical problems include seizure disorder, depression, history of craniotomy    Plan:  Patient remains intubated, sedated  Continue bronchodilators and wean ventilator as tolerated. Continue methylprednisolone per ID recommendations  Continue meropenem per ID recommendations  Continue Lamictal for history of seizure disorder  Continue Cardizem through NG tube. Appreciate pulmonary, cardiologist and ID recommendations    Patient overall prognosis remains poor due to severe COVID-19 pneumonia. DVT prophylaxis: Lovenox  CODE STATUS: Total support      Subjective. :     Patient seen and examined. He remains intubated and on mechanical ventilation. Objective:   Vitals:   Vitals:    22 0848   BP: (!) 177/102   Pulse: 72   Resp: 22   Temp:    SpO2: (!) 88%         Physical Exam:   GEN: Intubated, sedated  NECK: Supple, no apparent thyromegaly or masses. RESP: Clear lungs to auscultation  CARDIO/VASC: Regular rate and rhythm, normal S1, S2, no murmurs  GI: Abdomen is soft, nontender, no significant organomegaly noted, bowel sounds present  MSK: No gross joint deformities.   Skin: No significant rashes, skin lesions noted  Neuro: Intubated, mechanically ventilated, sedated  Medications:   Medications:    enoxaparin  1 mg/kg SubCUTAneous BID    dilTIAZem  30 mg Oral 4 times per day    insulin lispro  0-12 Units SubCUTAneous TID WC    insulin lispro  0-6 Units SubCUTAneous Nightly    methylPREDNISolone  60 mg IntraVENous Q6H    fluconazole  400 mg IntraVENous Q24H    pantoprazole  40 mg IntraVENous Daily    meropenem  1,000 mg IntraVENous Q8H    midodrine  10 mg Oral TID WC    cloBAZam  20 mg Oral BID    lidocaine PF  5 mL IntraDERmal Once    sodium chloride flush  5-40 mL IntraVENous 2 times per day    chlorhexidine  15 mL Mouth/Throat BID    famotidine (PEPCID) injection  20 mg IntraVENous BID    albuterol sulfate HFA  2 puff Inhalation Q4H WA    ipratropium  2 puff Inhalation 4x daily    lamoTRIgine  150 mg Oral BID    topiramate  200 mg Oral BID    [Held by provider] atorvastatin  80 mg Oral Nightly    sertraline  25 mg Oral Daily    ascorbic acid  500 mg Oral BID    guaiFENesin  600 mg Oral BID    sodium chloride flush  5-40 mL IntraVENous 2 times per day    Vitamin D  2,000 Units Oral Daily    influenza virus vaccine  0.5 mL IntraMUSCular Prior to discharge      Infusions:    dextrose      sodium chloride Stopped (01/29/22 0839)    norepinephrine Stopped (01/19/22 1706)    sodium chloride Stopped (01/15/22 0125)    propofol 35 mcg/kg/min (01/29/22 0627)    fentaNYL 62.5 mcg/hr (01/28/22 2302)    sodium chloride       PRN Meds: glucose, 15 g, PRN  dextrose, 12.5 g, PRN  glucagon (rDNA), 1 mg, PRN  dextrose, 100 mL/hr, PRN  potassium chloride, 40 mEq, PRN   Or  potassium alternative oral replacement, 40 mEq, PRN   Or  potassium chloride, 10 mEq, PRN  sodium chloride flush, 5-40 mL, PRN  sodium chloride, 25 mL, PRN  LORazepam, 1 mg, Q4H PRN  clonazePAM, 0.5 mg, BID PRN  bisacodyl, 10 mg, Daily PRN  sodium chloride flush, 5-40 mL, PRN  sodium chloride, 25 mL, PRN  ondansetron, 4 mg, Q8H PRN   Or  ondansetron, 4 mg, Q6H PRN  polyethylene glycol, 17 g, Daily PRN  acetaminophen, 650 mg, Q6H PRN   Or  acetaminophen, 650 mg, Q6H PRN          Electronically signed by Cecilia Hernandez MD on 1/29/2022 at 11:12 AM

## 2022-01-29 NOTE — PROGRESS NOTES
Infectious Disease Progress Note  2022   Patient Name: Mariann Davis : 1965       Reason for visit: F/u COVID-19 pneumonia, acute respiratory failure? History:? Interval history noted  Intubated, sedated and on mechanical ventilation  Physical Exam:  Vital Signs: /77   Pulse 70   Temp 97.3 °F (36.3 °C) (Rectal) Comment: Shani Hugger applied  Resp 11   Ht 6' 0.01\" (1.829 m)   Wt 189 lb 2.5 oz (85.8 kg)   SpO2 91%   BMI 25.65 kg/m²     Gen: intubated and sedated  Skin: no stigmata of endocarditis  Wounds: C/D/I  HEMT: AT/NC ETT OGT  Eyes: PERRLA. Neck: Supple. Trachea midline. No LAD. Chest:  transmitted breath sounds. Heart: RRR  Abd: soft, non-distended, no tenderness, no hepatomegaly. Normoactive bowel sounds. Ext: no clubbing, cyanosis, or edema  Catheter Site: without erythema or tenderness  LDA: left arm PICC , Urethral catheter;  Neuro: sedated and on the ventilator. Radiologic / Imaging / TESTING  ONE XRAY VIEW OF THE CHEST     2022 6:50 am     COMPARISON:   2022     HISTORY:   ORDERING SYSTEM PROVIDED HISTORY: intubated patient   TECHNOLOGIST PROVIDED HISTORY:   Reason for exam:->intubated patient   Reason for Exam: VENT     Follow-up exam     FINDINGS:   Endotracheal tube tip is 7.1 cm above the nicol. Multifocal airspace   opacities are not significantly changed. Lines and tubes are otherwise   stable in positioning. No pleural effusion. Stable cardiomediastinal   silhouette. No pneumothorax. Osseous structures otherwise stable. Impression:   Endotracheal tube tip is 7.1 cm above the nicol.   Otherwise no significant   interval change in bilateral pneumonia        Labs:    Recent Results (from the past 24 hour(s))   POCT Glucose    Collection Time: 22  7:42 AM   Result Value Ref Range    POC Glucose 146 (H) 70 - 99 MG/DL   POCT Glucose    Collection Time: 22 11:48 AM   Result Value Ref Range    POC Glucose 148 (H) 70 - 99 MG/DL   POCT Glucose    Collection Time: 01/28/22  4:29 PM   Result Value Ref Range    POC Glucose 160 (H) 70 - 99 MG/DL   POCT Glucose    Collection Time: 01/28/22 10:54 PM   Result Value Ref Range    POC Glucose 124 (H) 70 - 99 MG/DL   Blood gas, arterial    Collection Time: 01/29/22  6:00 AM   Result Value Ref Range    pH, Bld 7.37 7.34 - 7.45    pCO2, Arterial 41.0 32 - 45 MMHG    pO2, Arterial 64 (L) 75 - 100 MMHG    Base Excess 2 0 - 3.3    HCO3, Arterial 23.7 (H) 18 - 23 MMOL/L    CO2 Content 25.0 (H) 19 - 24 MMOL/L    O2 Sat 90.7 (L) 96 - 97 %    Carbon Monoxide, Blood 3.2 0 - 5 %    Methemoglobin, Arterial 1.4 <1.5 %    Comment PC20 RR24 .85 P11      CULTURE results: Invalid input(s): BLOOD CULTURE,  URINE CULTURE, SURGICAL CULTURE    Diagnosis:  Patient Active Problem List   Diagnosis    History of craniotomy    Malformation of cortical development of brain ([facility] [address]    Open wedge compression fracture of T11 vertebra with routine healing    Moderate episode of recurrent major depressive disorder (HCC)    Rhinitis, allergic    S/P placement of VNS (vagus nerve stimulation) device    Stenosis of right carotid artery    Symptomatic localization-related epilepsy (HCC)    HLD    Chronic right shoulder pain    Positive FIT (fecal immunochemical test)    Open wedge compression fracture of T12 vertebra with routine healing    Pneumonia due to COVID-19 virus    Acute respiratory failure with hypoxia (HCC)       Active Problems  Active Problems:    Pneumonia due to COVID-19 virus    Acute respiratory failure with hypoxia (HCC)  Resolved Problems:    * No resolved hospital problems. *      Impression and plan   Summary and rationale: Patient is a 64 y.o.  male with a medical hx of HLD, seizure disorder, s/p craniotomy admitted for severe COVID-19 pneumonia, intubated on 1/9/2022. He has critical COVID-19 pneumonia with hyper inflammation.   Respiratory culture positive for Enterobacter cloacae   Enterobacter cloacae superimposed bacterial infection.  COVID-19 symptom onset: 12/24/2021   COVID-19 test date: 1/27/2022   Expected discontinuation of isolation precautions: 1/13/2022   Clinical status: Some improvement as FiO2 is down to 90%, he is afebrile, CRP down to 49.1   Therapeutic:  o Ongoing antibiotics: continue meropenem 1/22-, fluconazole 1/26  o Immunomodulators:   - Solu-Medrol: Pulse dosing, 3-day course of 1000 mg daily to be completed on 1/25/2022. . continue on Solu-Medrol 60 mg every 6 hours  o Completed antibiotics: Ceftriaxone, azithromycin, vancomycin 1/22-25,  o Other agents:    Diagnostic: Trend CRP   F/u:   Other:       Electronically signed by: Electronically signed by Huey Haque MD on 1/29/2022 at 6:25 AM

## 2022-01-29 NOTE — PROGRESS NOTES
01/29/22 1249   Vent Information   Vent Type 980   Vent Mode AC/PC   Vt Ordered 0 mL   Rate Set 24 bmp   Peak Flow 0 L/min   Pressure Support 0 cmH20   FiO2  90 %   SpO2 (!) 85 %   SpO2/FiO2 ratio 94.44   Sensitivity 3   PEEP/CPAP 11   I Time/ I Time % 0 s   Vent Patient Data   High Peep/I Pressure 20   Peak Inspiratory Pressure 31 cmH2O   Mean Airway Pressure 19 cmH20   Rate Measured 26 br/min   Vt Exhaled 864 mL   Minute Volume 18.1 Liters   I:E Ratio 1:2.10   Plateau Pressure 28 KEG00   Static Compliance 39 mL/cmH2O   Total PEEP 12 cmH20   Auto PEEP 0 cmH20   Cough/Sputum   Sputum How Obtained Endotracheal;Suctioned   $Obtained Sample $Induced Sputum   Cough Non-productive   Frequency Infrequent   Sputum Amount None   Spontaneous Breathing Trial (SBT) RT Doc   Pulse 76   Additional Respiratory  Assessments   Position Semi-Phelps's   Subglottic Suction Done?  Yes   Alarm Settings   High Pressure Alarm 45 cmH2O   Press Low Alarm 40 cmH2O   Delay Alarm 20 sec(s)   Low Minute Volume Alarm 2.5 L/min   Apnea (secs) 20 secs   High Respiratory Rate 40 br/min   Low Exhaled Vt  250 mL

## 2022-01-29 NOTE — PROGRESS NOTES
pulmonary      SUBJECTIVE: intubated on vbent     OBJECTIVE    VITALS:  BP (!) 141/88   Pulse 76   Temp 97.8 °F (36.6 °C) (Rectal)   Resp 24   Ht 6' (1.829 m)   Wt 223 lb 1.7 oz (101.2 kg)   SpO2 (!) 85%   BMI 30.26 kg/m²   HEAD AND FACE EXAM:  No throat injection, no active exudate,no thrush  NECK EXAM;No JVD, no masses, symmetrical  CHEST EXAM; Expansion equal and symmetrical, no masses  LUNG EXAM; Good breath sounds bilaterally.  There are expiratory wheezes both lungs, there are crackles at both lung bases  CARDIOVASCULAR EXAM: Positive S1 and S2, no S3 or S4, no clicks ,no murmurs  RIGHT AND LEFT LOWER EXTRIMITY EXAM: No edema, no swelling, no inflamation            LABS   Lab Results   Component Value Date    WBC 21.8 (H) 01/29/2022    HGB 9.0 (L) 01/29/2022    HCT 30.2 (L) 01/29/2022    .5 (H) 01/29/2022     01/29/2022     Lab Results   Component Value Date    CREATININE 0.6 (L) 01/29/2022    BUN 34 (H) 01/29/2022     01/29/2022    K 4.1 01/29/2022     01/29/2022    CO2 24 01/29/2022     No results found for: INR, PROTIME       Lab Results   Component Value Date    PHOS 4.2 01/25/2022        Recent Labs     01/27/22  0600 01/28/22  0600 01/29/22  0600   PH 7.34 7.29* 7.37   PO2ART 65* 46* 64*   [id number] 38.0 44.0 41.0   O2SAT 83.5* 77.2* 90.7*         Wt Readings from Last 3 Encounters:   01/29/22 223 lb 1.7 oz (101.2 kg)   01/06/22 185 lb (83.9 kg)   12/16/21 186 lb 9.6 oz (84.6 kg)        EXAMINATION:   ONE XRAY VIEW OF THE CHEST       1/29/2022 5:53 am       COMPARISON:   Chest radiograph performed 01/28/2022.       HISTORY:   ORDERING SYSTEM PROVIDED HISTORY: intubated patient   TECHNOLOGIST PROVIDED HISTORY:   Reason for exam:->intubated patient   Reason for Exam: intubated patient       FINDINGS:   There are bilateral infiltrates.  There is no pneumothorax.  The mediastinal   structures are unremarkable.  The upper abdomen is unremarkable.  The   extrathoracic soft tissues are unremarkable.  There is endotracheal tube tip   in the proximal midtrachea.  There is a gastric tube with the tip in the   proximal aspect of the stomach.  There is a left-sided PICC line with the tip   in the mid SVC.           Impression   Bilateral pulmonary infiltrates consistent with pneumonia.  This is improved.       Support tubes as described above.                   ASSESMENT  Ac resp failure  covid pneumonia  Bact pneumonia        PLAN  1. cpm  2. Cont full vent support  3.  Tube feed    1/29/2022  Shu Diaz MD, M.D.

## 2022-01-29 NOTE — PROGRESS NOTES
Pt not responding to pain or commands. Attempted 60-min sedation vacation but no change. Pt will blink non-purposefully but cannot track with eyes, blink on command, or move any limb in response to command or pain. Facial twitching noted around 1200, ionized calcium was checked and WDL. Pt has h/o seizure disorder, currently on topirimate, lamotrigine, and clobazam PO through OGT. Corneal reflex present when touched directly with a clean cotton swab, but no blink in response to fingers being flicked close to the eyes. Bowel sounds are also very hypoactive with some rebound on palpation of the abdomen. No BMs have been documented recently. Findings relayed to Dr. Edita Tidwell; neuro consult ordered.

## 2022-01-30 NOTE — PROGRESS NOTES
pulmonary      SUBJECTIVE:  He remains on vent support and high settings     OBJECTIVE    VITALS:  /84   Pulse 68   Temp 98 °F (36.7 °C) (Rectal)   Resp 24   Ht 6' (1.829 m)   Wt 223 lb 1.7 oz (101.2 kg)   SpO2 92%   BMI 30.26 kg/m²   HEAD AND FACE EXAM:  No throat injection, no active exudate,no thrush  NECK EXAM;No JVD, no masses, symmetrical  CHEST EXAM; Expansion equal and symmetrical, no masses  LUNG EXAM; Good breath sounds bilaterally. There are expiratory wheezes both lungs, there are crackles at both lung bases  CARDIOVASCULAR EXAM: Positive S1 and S2, no S3 or S4, no clicks ,no murmurs  RIGHT AND LEFT LOWER EXTRIMITY EXAM: No edema, no swelling, no inflamation          LABS   Lab Results   Component Value Date    WBC 25.2 (H) 01/30/2022    HGB 9.3 (L) 01/30/2022    HCT 30.2 (L) 01/30/2022    .8 (H) 01/30/2022     (H) 01/30/2022     Lab Results   Component Value Date    CREATININE 0.6 (L) 01/30/2022    BUN 37 (H) 01/30/2022     01/30/2022    K 4.3 01/30/2022     01/30/2022    CO2 24 01/30/2022     No results found for: INR, PROTIME       Lab Results   Component Value Date    PHOS 4.2 01/25/2022        Recent Labs     01/28/22  0600 01/29/22  0600 01/30/22  0600   PH 7.29* 7.37 7.35   PO2ART 46* 64* 70*   [id number] 44.0 41.0 44.0   O2SAT 77.2* 90.7* 87.0*         Wt Readings from Last 3 Encounters:   01/29/22 223 lb 1.7 oz (101.2 kg)   01/06/22 185 lb (83.9 kg)   12/16/21 186 lb 9.6 oz (84.6 kg)               ASSESMENT  Ac resp failure  covid pneumonia  Bact pneumonia        PLAN  1. cxr pending  2. cpm  3. Cont full vent support  4.  Not weanable at present    1/30/2022  Dania Gonzalez MD, M.D.

## 2022-01-30 NOTE — CONSULTS
Neurology Service Consult Note  [facility]  Patient Name: Serjio Pretty  : 1965        Subjective:   Reason for consult: Not waking up   64 y. o.right -handed male past medical history listed below presenting to [facility] with acute hypoxia and fatigue with COVID-19 on ,  days prior to this examination. Patient has required intubation, currently intubated and sedated, he has a significant history of seizures and he is on 3 different seizure medications as described below and is followed by the epilepsy clinic at [facility]. Patient has not made a profound recovery has not made any neurological advancements and we are asked for prognosis and evaluation of his mentation. Nursing staff has not observed any overt seizure activity however he does blink at times when off sedation, and his other neurological findings are very sporadic at times he will cough, however that he will not have a gag or cough during suctioning, and during my exam he was able to grimace and withdraw to pain in the right lower extremity only but when I went back for second exam turning the sedation off he would not withdraw to pain in the right lower extremity. I got no response of the bilateral upper extremities to pain, it is noted that patient did go into A. fib RVR during the admission, no CT or evaluation stroke has been completed at this. Per [facility]   Average seizures are 5-7 per month. Stress is his trigger.   Seizures: Now having 0-2 per month. AED's:  Onfi 20mg BID  LTG XR 200mg, 2 tabs BID  Topamax 200mg, 1.5 pills BID  KLP for rescue              Past Medical History:   Diagnosis Date    HLD 10/21/2021    Moderate episode of recurrent major depressive disorder (HCC)     Seizures (HCC)     :   Past Surgical History:   Procedure Laterality Date    BRAIN SURGERY       Medications:  Scheduled Meds:   dilTIAZem  30 mg Oral 4 times per day    insulin lispro  0-12 Units SubCUTAneous TID WC insulin lispro  0-6 Units SubCUTAneous Nightly    methylPREDNISolone  60 mg IntraVENous Q6H    fluconazole  400 mg IntraVENous Q24H    pantoprazole  40 mg IntraVENous Daily    meropenem  1,000 mg IntraVENous Q8H    midodrine  10 mg Oral TID WC    cloBAZam  20 mg Oral BID    lidocaine PF  5 mL IntraDERmal Once    sodium chloride flush  5-40 mL IntraVENous 2 times per day    chlorhexidine  15 mL Mouth/Throat BID    famotidine (PEPCID) injection  20 mg IntraVENous BID    albuterol sulfate HFA  2 puff Inhalation Q4H WA    ipratropium  2 puff Inhalation 4x daily    lamoTRIgine  150 mg Oral BID    topiramate  200 mg Oral BID    [Held by provider] atorvastatin  80 mg Oral Nightly    sertraline  25 mg Oral Daily    ascorbic acid  500 mg Oral BID    guaiFENesin  600 mg Oral BID    sodium chloride flush  5-40 mL IntraVENous 2 times per day    Vitamin D  2,000 Units Oral Daily    influenza virus vaccine  0.5 mL IntraMUSCular Prior to discharge     Continuous Infusions:   dextrose      sodium chloride Stopped (01/29/22 0839)    norepinephrine Stopped (01/19/22 1706)    sodium chloride Stopped (01/15/22 0125)    propofol 30 mcg/kg/min (01/30/22 0701)    fentaNYL 62.5 mcg/hr (01/30/22 1224)    sodium chloride       PRN Meds:.glucose, dextrose, glucagon (rDNA), dextrose, potassium chloride **OR** potassium alternative oral replacement **OR** potassium chloride, sodium chloride flush, sodium chloride, LORazepam, clonazePAM, bisacodyl, sodium chloride flush, sodium chloride, ondansetron **OR** ondansetron, polyethylene glycol, acetaminophen **OR** acetaminophen    No Known Allergies  Social History     Socioeconomic History    Marital status:      Spouse name: Not on file    Number of children: Not on file    Years of education: Not on file    Highest education level: Not on file   Occupational History    Not on file   Tobacco Use    Smoking status: Never Smoker    Smokeless tobacco: Never Used   Substance and Sexual Activity    Alcohol use: No    Drug use: No    Sexual activity: Not on file   Other Topics Concern    Not on file   Social History Narrative    Not on file     Social Determinants of Health     Financial Resource Strain: Low Risk     Difficulty of Paying Living Expenses: Not hard at all   Food Insecurity: No Food Insecurity    Worried About Running Out of Food in the Last Year: Never true    [address] in the Last Year: Never true   Transportation Needs: No Transportation Needs    Lack of Transportation (Medical): No    Lack of Transportation (Non-Medical): No   Physical Activity:     Days of Exercise per Week: Not on file    Minutes of Exercise per Session: Not on file   Stress:     Feeling of Stress : Not on file   Social Connections:     Frequency of Communication with Friends and Family: Not on file    Frequency of Social Gatherings with Friends and Family: Not on file    Attends Yazidism Services: Not on file    Active Member of Clubs or Organizations: Not on file    Attends Club or Organization Meetings: Not on file    Marital Status: Not on file   Intimate Partner Violence:     Fear of Current or Ex-Partner: Not on file    Emotionally Abused: Not on file    Physically Abused: Not on file    Sexually Abused: Not on file   Housing Stability:     Unable to Pay for Housing in the Last Year: Not on file    Number of [place] in the Last Year: Not on file    Unstable Housing in the Last Year: Not on file      History reviewed. No pertinent family history.       ROS (10 systems)  Due to acute state of encephalopathy patient cannot answer review of system questions    Physical Exam:         Wt Readings from Last 3 Encounters:   01/30/22 221 lb 5.5 oz (100.4 kg)   01/06/22 185 lb (83.9 kg)   12/16/21 186 lb 9.6 oz (84.6 kg)     Temp Readings from Last 3 Encounters:   01/30/22 97.6 °F (36.4 °C) (Rectal)   01/06/22 98.4 °F (36.9 °C) (Oral)   12/16/21 97.2 °F (36.2 °C) (Infrared)     BP Readings from Last 3 Encounters:   01/30/22 127/80   01/06/22 128/83   12/16/21 112/72     Pulse Readings from Last 3 Encounters:   01/30/22 68   01/06/22 76   12/16/21 61        Gen: Intubated and sedated  HEENT: NC/AT, EOMI, PERRL, mmm, no carotid bruits, neck supple, no meningeal signs; Heart: irregular   Lungs: CTAB  Ext: no edema, no calf tenderness b/l  Psych: normal mood and affect  Skin: no rashes or lesions    NEUROLOGIC EXAM:    Mental Status: Intubated, sedated and obtunded    Cranial Nerve Exam:   CN II-XII: Pupils equal and reactive, corneals intact, patient has doll's eyes, face symmetrical tongue appears midline against the ET tube    Motor Exam/Sensation: Withdrawal to pain and grimace in the right lower extremity      Deep Tendon Reflexes: 1/4 biceps, triceps, brachioradialis, patellar, and achilles b/l; flexor plantar responses b/l        Coordination/Cerebellum:       Tremors--none    Gait and stance:      Gait: deferred      LABS:     Recent Labs     01/28/22  0515 01/29/22  0822 01/30/22  0233   WBC 22.3* 21.8* 25.2*    141 139   K 3.8 4.1 4.3    110 109   CO2 22 24 24   BUN 36* 34* 37*   CREATININE 0.6* 0.6* 0.6*   GLUCOSE 155* 131* 144*         IMAGING:    CT head pending  EEG pending      ASSESSMENT/PLAN:     63-year-old male with continued altered mental status secondary to static metabolic encephalopathy v. Deep hypoxic injury superimposed on prolonged complicated SIVOD-02 infections and known seizure disorder with extensive AED usage. New onset A. Fib will rule out stroke. Plan of care as follows:  Neuro exam:  Pupils equal and reactive bilaterally  Corneal intact bilaterally  Patient did withdraw to pain in the right lower extremity otherwise no cortical function noted  Blinking  Neurodiagnostics:  CT head pending  EEG pending  Medications:  Continue home antiepileptic drugs after EEG may adjust, I have put him on the doses that he is supposed to be on per [facility]'s last note.   Continue to wean sedation  Recommend anticoagulation for stroke prevention   PT/OT/ST:  Per their recommendation  Follow-up:  Pending completion of neurodiagnostic        Thank you for allowing us to participate in the care of your patient. If there are any questions regarding evaluation please feel free to contact us. PATRICE Bueno - CNP, 1/30/2022    Attending Note:  I have rounded on this patient with Tevin Cancino CNP. I have reviewed the chart and we have discussed this case in detail. The patient was seen and examined by myself. Pertinent labs and imaging have been personally reviewed. Our findings and impressions were discussed with the patient. I concur with the Nurse Practioner's assessment and plan.     Octavio Sagastume DO 1/30/2022 9:21 PM

## 2022-01-30 NOTE — PROGRESS NOTES
Hospitalist Progress Note      Name:  Estevan Emanuel /Age/Sex: 1965  (64 y.o. male)   MRN & CSN:  [id number] & [id number] Admission Date/Time: 2022  9:37 AM   Location:  [facility] PCP: Nicola Aguilar MD         Hospital Day: 24      Assessment and Plan:   Patient is 22-year-old male who was admitted for acute hypoxic respiratory failure. Patient remains intubated. #.  Acute hypoxic respiratory failure: Patient remains intubated on mechanical ventilation. #.  Viral pneumonia due to COVID-19  #. Suspected superimposed bacterial pneumonia  #. New onset A. fib  #. Shock likely secondary to COVID-19 and suspected superimposed bacterial pneumonia: Weaned off of Levophed  #. Transaminitis secondary to COVID-19 (Lipitor and baricitinib discontinued)  #. Acute on chronic anemia  #. Other medical problems include seizure disorder, depression, history of craniotomy    Plan:  Patient remains intubated, sedated  Continue bronchodilators and wean ventilator as tolerated. Continue methylprednisolone per ID recommendations  Continue meropenem per ID recommendations  Continue Lamictal for history of seizure disorder  Continue Cardizem through NG tube. Appreciate pulmonary, cardiologist and ID recommendations    Patient overall prognosis remains poor due to severe COVID-19 pneumonia. DVT prophylaxis: Lovenox  CODE STATUS: Total support      Subjective. :     Patient seen and examined. He remains intubated and on mechanical ventilation. Objective:   Vitals:   Vitals:    22 1550   BP:    Pulse: 90   Resp:    Temp:    SpO2: (!) 81%         Physical Exam:   GEN: Intubated, sedated  NECK: Supple, no apparent thyromegaly or masses. RESP: Clear lungs to auscultation  CARDIO/VASC: Regular rate and rhythm, normal S1, S2, no murmurs  GI: Abdomen is soft, nontender, no significant organomegaly noted, bowel sounds present  MSK: No gross joint deformities.   Skin: No significant rashes, skin lesions noted  Neuro: Intubated, mechanically ventilated, sedated  Medications:   Medications:    lamoTRIgine  400 mg Oral BID    topiramate  350 mg Oral BID    dilTIAZem  30 mg Oral 4 times per day    insulin lispro  0-12 Units SubCUTAneous TID WC    insulin lispro  0-6 Units SubCUTAneous Nightly    methylPREDNISolone  60 mg IntraVENous Q6H    fluconazole  400 mg IntraVENous Q24H    pantoprazole  40 mg IntraVENous Daily    meropenem  1,000 mg IntraVENous Q8H    midodrine  10 mg Oral TID WC    cloBAZam  20 mg Oral BID    lidocaine PF  5 mL IntraDERmal Once    sodium chloride flush  5-40 mL IntraVENous 2 times per day    chlorhexidine  15 mL Mouth/Throat BID    famotidine (PEPCID) injection  20 mg IntraVENous BID    albuterol sulfate HFA  2 puff Inhalation Q4H WA    ipratropium  2 puff Inhalation 4x daily    [Held by provider] atorvastatin  80 mg Oral Nightly    sertraline  25 mg Oral Daily    ascorbic acid  500 mg Oral BID    guaiFENesin  600 mg Oral BID    sodium chloride flush  5-40 mL IntraVENous 2 times per day    Vitamin D  2,000 Units Oral Daily    influenza virus vaccine  0.5 mL IntraMUSCular Prior to discharge      Infusions:    dextrose      sodium chloride Stopped (01/29/22 0839)    norepinephrine Stopped (01/19/22 1706)    sodium chloride Stopped (01/15/22 0125)    propofol 40 mcg/kg/min (01/30/22 1542)    fentaNYL Stopped (01/30/22 1330)    sodium chloride       PRN Meds: glucose, 15 g, PRN  dextrose, 12.5 g, PRN  glucagon (rDNA), 1 mg, PRN  dextrose, 100 mL/hr, PRN  potassium chloride, 40 mEq, PRN   Or  potassium alternative oral replacement, 40 mEq, PRN   Or  potassium chloride, 10 mEq, PRN  sodium chloride flush, 5-40 mL, PRN  sodium chloride, 25 mL, PRN  LORazepam, 1 mg, Q4H PRN  clonazePAM, 0.5 mg, BID PRN  bisacodyl, 10 mg, Daily PRN  sodium chloride flush, 5-40 mL, PRN  sodium chloride, 25 mL, PRN  ondansetron, 4 mg, Q8H PRN   Or  ondansetron, 4 mg, Q6H PRN  polyethylene glycol, 17 g, Daily PRN  acetaminophen, 650 mg, Q6H PRN   Or  acetaminophen, 650 mg, Q6H PRN          Electronically signed by Dave Francois MD on 1/30/2022 at 4:30 PM

## 2022-01-30 NOTE — PROGRESS NOTES
01/30/22 0814   Vent Information   $Ventilation $Subsequent Day   Vent Type 980   Vent Mode AC/PC   Vt Ordered 0 mL   Rate Set 24 bmp   Peak Flow 0 L/min   Pressure Support 0 cmH20   FiO2  90 %   Sensitivity 3   PEEP/CPAP 11   I Time/ I Time % 0 s   Vent Patient Data   High Peep/I Pressure 20   Peak Inspiratory Pressure 31 cmH2O   Mean Airway Pressure 18 cmH20   Rate Measured 24 br/min   Vt Exhaled 739 mL   Minute Volume 17.6 Liters   I:E Ratio 1:2.10   Cough/Sputum   Sputum How Obtained Endotracheal   $Obtained Sample $Induced Sputum   Cough Non-productive   Spontaneous Breathing Trial (SBT) RT Doc   Pulse 75   Additional Respiratory  Assessments   Subglottic Suction Done?  Yes   Alarm Settings   High Pressure Alarm 45 cmH2O

## 2022-01-31 NOTE — PROGRESS NOTES
01/31/22 0708   Vent Information   $Ventilation $Subsequent Day   Equipment Changed HME   Vent Type 980   Vent Mode AC/PC   Vt Ordered 0 mL   Pressure Ordered 20   Rate Set 24 bmp   Peak Flow 0 L/min   Pressure Support 0 cmH20   FiO2  100 %   Sensitivity 3   PEEP/CPAP 8   I Time/ I Time % 1 s   Humidification Source HME   Vent Patient Data   High Peep/I Pressure 20   Peak Inspiratory Pressure 29 cmH2O   Mean Airway Pressure 18 cmH20   Rate Measured 24 br/min   Vt Exhaled 761 mL   Minute Volume 17.6 Liters   I:E Ratio 1.00:1   Plateau Pressure 26 JBW40   Static Compliance 48 mL/cmH2O   Total PEEP 11 cmH20   Auto PEEP 2.2 cmH20   Cough/Sputum   Sputum How Obtained Endotracheal;Suctioned   $Obtained Sample $Induced Sputum   Sputum Amount None   Spontaneous Breathing Trial (SBT) RT Doc   Pulse 70   Alarm Settings   High Pressure Alarm 45 cmH2O   Low Minute Volume Alarm 2.5 L/min   Apnea (secs) 20 secs   High Respiratory Rate 40 br/min   Low Exhaled Vt  250 mL   ETT (adult)   Placement Date/Time: 01/23/22 (c 0930   Preoxygenation: Yes  Technique: Video laryngoscopy  Type: Cuffed  Tube Size: (c) 8 mm  Laryngoscope: Mac  Blade Size: 4  Location: Oral  Grade View: Full view of the glottis  Insertion attempts: 1  Placement Ve. ..    Secured at 24 cm   Measured From Lips   ET Placement Left   Secured By Commercial tube jain   Site Condition Dry

## 2022-01-31 NOTE — PROGRESS NOTES
Entered patients room, patients neck is swollen mainly right side, some left. Family all at bedside. Patient remains a full code. Peep remains at 22. Dr Zak Colunga and RT [facility] notified.

## 2022-01-31 NOTE — PROGRESS NOTES
01/31/22 1724   Vent Information   Vent Type 980   Vent Mode AC/PC   Vt Ordered 0 mL   Pressure Ordered 25   Rate Set 24 bmp   Peak Flow 0 L/min   Pressure Support 0 cmH20   FiO2  100 %   SpO2 (!) 57 %   SpO2/FiO2 ratio 57   Sensitivity 2   PEEP/CPAP 25   I Time/ I Time % 1.25 s   Humidification Source HME   Vent Patient Data   High Peep/I Pressure 22   Peak Inspiratory Pressure 49 cmH2O   Mean Airway Pressure 35 cmH20   Rate Measured 26 br/min   Vt Exhaled 424 mL   Minute Volume 10.5 Liters   I:E Ratio 1:1.80   Cough/Sputum   Sputum How Obtained Endotracheal   $Obtained Sample $Induced Sputum   Cough Productive   Sputum Amount Small   Sputum Color Brown   Alarm Settings   High Pressure Alarm 50 cmH2O   Delay Alarm 20 sec(s)   Low Minute Volume Alarm 2.5 L/min   Apnea (secs) 20 secs   High Respiratory Rate 40 br/min   Low Exhaled Vt  250 mL   ETT (adult)   Placement Date/Time: 01/23/22 (c) 0930   Preoxygenation: Yes  Technique: Video laryngoscopy  Type: Cuffed  Tube Size: (c) 8 mm  Laryngoscope: Mac  Blade Size: 4  Location: Oral  Grade View: Full view of the glottis  Insertion attempts: 1  Placement Ve. .. Secured at 24 cm   Measured From [address] [place] [address],[address] By Commercial tube jain   Site Condition Dry   INCREASED PEEP TO [id number] PER DR. Dewayne Rios, ALSO INCREASE IP TO 25 AND INCREASED RR TO 24. PATIENT SPO2 IS STILL 57%. DR Bienvenido Galindo AT BEDSIDE AS WELL AS FAMILY.   DR Dewayne Rios MADE AWARE OF ALL CHANGES

## 2022-01-31 NOTE — PROGRESS NOTES
Hospitalist Progress Note      Name:  Salome Olszewski /Age/Sex: 1965  (64 y.o. male)   MRN & CSN:  [id number] & [id number] Admission Date/Time: 2022  9:37 AM   Location:  -A PCP: Tania Alfred MD         Hospital Day: 25      Assessment and Plan:   Patient is 63-year-old male who was admitted for acute hypoxic respiratory failure. Patient remains intubated. #.  Acute hypoxic respiratory failure: Patient remains intubated on mechanical ventilation. #.  Continues to desaturate on maximal vent support. #.  Viral pneumonia due to COVID-19  #. Suspected superimposed bacterial pneumonia  #. New onset A. fib  #. Shock likely secondary to COVID-19 and suspected superimposed bacterial pneumonia: Weaned off of Levophed  #. Transaminitis secondary to COVID-19 (Lipitor and baricitinib discontinued)  #. Acute on chronic anemia  #. Other medical problems include seizure disorder, depression, history of craniotomy    Plan:  Patient remains intubated, sedated  Continue bronchodilators and wean ventilator as tolerated. Continue methylprednisolone per ID recommendations  Continue meropenem per ID recommendations  Continue Lamictal for history of seizure disorder  Continue Cardizem through NG tube. Appreciate pulmonary, cardiologist and ID recommendations    Patient overall prognosis remains poor due to severe COVID-19 pneumonia. DVT prophylaxis: Lovenox  CODE STATUS: Total support      Subjective. :     Patient seen and examined. He remains intubated and on mechanical ventilation. Objective:   Vitals:   Vitals:    22 1603   BP:    Pulse:    Resp:    Temp:    SpO2: (!) 71%         Physical Exam:   GEN: Intubated, sedated  NECK: Supple, no apparent thyromegaly or masses.   RESP: Clear lungs to auscultation  CARDIO/VASC: Regular rate and rhythm, normal S1, S2, no murmurs  GI: Abdomen is soft, nontender, no significant organomegaly noted, bowel sounds present  MSK: No gross joint deformities.   Skin: No significant rashes, skin lesions noted  Neuro: Intubated, mechanically ventilated, sedated  Medications:   Medications:    rocuronium  1 mg/kg IntraVENous Once    furosemide        acetylcysteine  2 mL Inhalation TID    lamoTRIgine  400 mg Oral BID    topiramate  350 mg Oral BID    dilTIAZem  30 mg Oral 4 times per day    insulin lispro  0-12 Units SubCUTAneous TID WC    insulin lispro  0-6 Units SubCUTAneous Nightly    methylPREDNISolone  60 mg IntraVENous Q6H    fluconazole  400 mg IntraVENous Q24H    pantoprazole  40 mg IntraVENous Daily    meropenem  1,000 mg IntraVENous Q8H    midodrine  10 mg Oral TID WC    cloBAZam  20 mg Oral BID    lidocaine PF  5 mL IntraDERmal Once    sodium chloride flush  5-40 mL IntraVENous 2 times per day    chlorhexidine  15 mL Mouth/Throat BID    famotidine (PEPCID) injection  20 mg IntraVENous BID    albuterol sulfate HFA  2 puff Inhalation Q4H WA    ipratropium  2 puff Inhalation 4x daily    [Held by provider] atorvastatin  80 mg Oral Nightly    sertraline  25 mg Oral Daily    ascorbic acid  500 mg Oral BID    guaiFENesin  600 mg Oral BID    sodium chloride flush  5-40 mL IntraVENous 2 times per day    Vitamin D  2,000 Units Oral Daily    influenza virus vaccine  0.5 mL IntraMUSCular Prior to discharge      Infusions:    vecuronium (NORCURON) infusion      dextrose      sodium chloride Stopped (01/29/22 0839)    norepinephrine Stopped (01/19/22 1706)    sodium chloride Stopped (01/15/22 0125)    propofol 50 mcg/kg/min (01/31/22 1327)    fentaNYL 62.5 mcg/hr (01/31/22 0716)    sodium chloride       PRN Meds: glucose, 15 g, PRN  dextrose, 12.5 g, PRN  glucagon (rDNA), 1 mg, PRN  dextrose, 100 mL/hr, PRN  potassium chloride, 40 mEq, PRN   Or  potassium alternative oral replacement, 40 mEq, PRN   Or  potassium chloride, 10 mEq, PRN  sodium chloride flush, 5-40 mL, PRN  sodium chloride, 25 mL, PRN  LORazepam, 1 mg, Q4H PRN  clonazePAM, 0.5 mg, BID PRN  bisacodyl, 10 mg, Daily PRN  sodium chloride flush, 5-40 mL, PRN  sodium chloride, 25 mL, PRN  ondansetron, 4 mg, Q8H PRN   Or  ondansetron, 4 mg, Q6H PRN  polyethylene glycol, 17 g, Daily PRN  acetaminophen, 650 mg, Q6H PRN   Or  acetaminophen, 650 mg, Q6H PRN          Electronically signed by Lucas Moore MD on 1/31/2022 at 5:19 PM

## 2022-01-31 NOTE — PROGRESS NOTES
Stat chest xray ordered per Dr Toya Sheikh. Xray called. Sister is at bedside and aware, updating other family members.

## 2022-01-31 NOTE — PROGRESS NOTES
Patient again began to desat into the low 70's. Julia in unit,  Yue, Transfer, and Kaiden both at bedside. Sister is at bedside and updated.

## 2022-01-31 NOTE — PROGRESS NOTES
Dr Jayme Henderson called to bedside to assess. Patients oxygen continues to be in the 70's. Paralytic started per Dr Tanya Ramirez order. Train of 4,  4 twitches noted on 4amps.

## 2022-01-31 NOTE — PROGRESS NOTES
Infectious Disease Progress Note  2022   Patient Name: Poonam Acevedo : 1965       Reason for visit: F/u COVID-19 pneumonia, acute respiratory failure? History:? Interval history noted  Intubated, sedated and on mechanical ventilation  Physical Exam:  Vital Signs: /78   Pulse 76   Temp 97.8 °F (36.6 °C) (Rectal)   Resp 29   Ht 6' (1.829 m)   Wt 221 lb 5.5 oz (100.4 kg)   SpO2 92%   BMI 30.02 kg/m²     Gen: intubated and sedated  Skin: no stigmata of endocarditis  Wounds: C/D/I  HEMT: AT/NC ETT OGT  Eyes: PERRLA. Neck: Supple. Trachea midline. No LAD. Chest:  transmitted breath sounds. Heart: RRR  Abd: soft, non-distended, no tenderness, no hepatomegaly. Normoactive bowel sounds. Ext: no clubbing, cyanosis, or edema  Catheter Site: without erythema or tenderness  LDA: left arm PICC , Urethral catheter;  Neuro: sedated and on the ventilator. Radiologic / Imaging / TESTING  ONE XRAY VIEW OF THE CHEST     2022 6:50 am     COMPARISON:   2022     HISTORY:   ORDERING SYSTEM PROVIDED HISTORY: intubated patient   TECHNOLOGIST PROVIDED HISTORY:   Reason for exam:->intubated patient   Reason for Exam: VENT     Follow-up exam     FINDINGS:   Endotracheal tube tip is 7.1 cm above the nicol. Multifocal airspace   opacities are not significantly changed. Lines and tubes are otherwise   stable in positioning. No pleural effusion. Stable cardiomediastinal   silhouette. No pneumothorax. Osseous structures otherwise stable. Impression:   Endotracheal tube tip is 7.1 cm above the nicol.   Otherwise no significant   interval change in bilateral pneumonia        Labs:    Recent Results (from the past 24 hour(s))   POCT Glucose    Collection Time: 22 12:18 PM   Result Value Ref Range    POC Glucose 148 (H) 70 - 99 MG/DL   Blood gas, arterial    Collection Time: 22  3:45 PM   Result Value Ref Range    pH, Bld 7.37 7.34 - 7.45    pCO2, Arterial 40.0 32 - 45 MMHG pO2, Arterial 82 75 - 100 MMHG    Base Excess 2 0 - 3.3    HCO3, Arterial 23.1 (H) 18 - 23 MMOL/L    CO2 Content 24.3 (H) 19 - 24 MMOL/L    O2 Sat 92.2 (L) 96 - 97 %    Carbon Monoxide, Blood 2.4 0 - 5 %    Methemoglobin, Arterial 1.3 <1.5 %    Comment ACPC24 IP20 P6 1.0 FIO2    POCT Glucose    Collection Time: 01/30/22  5:49 PM   Result Value Ref Range    POC Glucose 109 (H) 70 - 99 MG/DL   POCT Glucose    Collection Time: 01/30/22  8:55 PM   Result Value Ref Range    POC Glucose 122 (H) 70 - 99 MG/DL   D-dimer, quantitative    Collection Time: 01/31/22  4:15 AM   Result Value Ref Range    D-Dimer, Quant 717 (H) <230 NG/mL(DDU)   Basic metabolic panel    Collection Time: 01/31/22  4:15 AM   Result Value Ref Range    Sodium 140 135 - 145 MMOL/L    Potassium 4.6 3.5 - 5.1 MMOL/L    Chloride 109 99 - 110 mMol/L    CO2 24 21 - 32 MMOL/L    Anion Gap 7 4 - 16    BUN 37 (H) 6 - 23 MG/DL    CREATININE 0.6 (L) 0.9 - 1.3 MG/DL    Glucose 164 (H) 70 - 99 MG/DL    Calcium 7.7 (L) 8.3 - 10.6 MG/DL    GFR Non-African American >60 >60 mL/min/1.73m2    GFR African American >60 >60 mL/min/1.73m2   CBC auto differential    Collection Time: 01/31/22  4:15 AM   Result Value Ref Range    WBC 21.5 (H) 4.0 - 10.5 K/CU MM    RBC 2.62 (L) 4.6 - 6.2 M/CU MM    Hemoglobin 8.4 (L) 13.5 - 18.0 GM/DL    Hematocrit 27.9 (L) 42 - 52 %    .5 (H) 78 - 100 FL    MCH 32.1 (H) 27 - 31 PG    MCHC 30.1 (L) 32.0 - 36.0 %    RDW 17.1 (H) 11.7 - 14.9 %    Platelets 085 382 - 407 K/CU MM    MPV 10.1 7.5 - 11.1 FL    Metamyelocytes Relative 2 (H) 0.0 %    Bands Relative 9 5 - 11 %    Segs Relative 83.0 (H) 36 - 66 %    Lymphocytes % 3.0 (L) 24 - 44 %    Monocytes % 3.0 0 - 4 %    Metamyelocytes Absolute 0.43 K/CU MM    Bands Absolute 1.94 K/CU MM    Segs Absolute 17.9 K/CU MM    Lymphocytes Absolute 0.6 K/CU MM    Monocytes Absolute 0.6 K/CU MM    Differential Type MANUAL DIFFERENTIAL     Anisocytosis 1+     Polychromasia 1+     Tear Drop Cells 1+ Giant PLTs PRESENT    Blood gas, arterial    Collection Time: 01/31/22  6:00 AM   Result Value Ref Range    pH, Bld 7.38 7.34 - 7.45    pCO2, Arterial 42.0 32 - 45 MMHG    pO2, Arterial 62 (L) 75 - 100 MMHG    Base Excess 0 0 - 3.3    HCO3, Arterial 24.8 (H) 18 - 23 MMOL/L    CO2 Content 26.1 (H) 19 - 24 MMOL/L    O2 Sat 83.7 (L) 96 - 97 %    Carbon Monoxide, Blood 2.5 0 - 5 %    Methemoglobin, Arterial 1.2 <1.5 %    Comment PC20 R24 +6 100%    POCT Glucose    Collection Time: 01/31/22  9:15 AM   Result Value Ref Range    POC Glucose 158 (H) 70 - 99 MG/DL     CULTURE results: Invalid input(s): BLOOD CULTURE,  URINE CULTURE, SURGICAL CULTURE    Diagnosis:  Patient Active Problem List   Diagnosis    History of craniotomy    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)    Open wedge compression fracture of T11 vertebra with routine healing    Moderate episode of recurrent major depressive disorder (HCC)    Rhinitis, allergic    S/P placement of VNS (vagus nerve stimulation) device    Stenosis of right carotid artery    Symptomatic localization-related epilepsy (HCC)    HLD    Chronic right shoulder pain    Positive FIT (fecal immunochemical test)    Open wedge compression fracture of T12 vertebra with routine healing    Pneumonia due to COVID-19 virus    Acute respiratory failure with hypoxia (HCC)       Active Problems  Active Problems:    Pneumonia due to COVID-19 virus    Acute respiratory failure with hypoxia (HCC)  Resolved Problems:    * No resolved hospital problems. *      Impression and plan   Summary and rationale: Patient is a 64 y.o.  male with a medical hx of HLD, seizure disorder, s/p craniotomy admitted for severe COVID-19 pneumonia, intubated on 1/9/2022. He has critical COVID-19 pneumonia with hyper inflammation. Respiratory culture positive for Enterobacter cloacae   Enterobacter cloacae superimposed bacterial infection.    COVID-19 symptom onset: 12/24/2021   COVID-19 test date: 1/27/2022   Expected discontinuation of isolation precautions: 1/13/2022   Clinical status: Some improvement as FiO2 is down to 90%, he is afebrile, CRP down to 49.1   Therapeutic:  o Ongoing antibiotics: continue meropenem 1/22-, fluconazole 1/26  o Immunomodulators:   - Solu-Medrol: Pulse dosing, 3-day course of 1000 mg daily to be completed on 1/25/2022. . continue on Solu-Medrol 60 mg every 6 hours  o Completed antibiotics: Ceftriaxone, azithromycin, vancomycin 1/22-25,  o Other agents:    Diagnostic: Trend CRP   F/u:   Other:       Electronically signed by: Electronically signed by Kaushik Leiva MD on 1/31/2022 at 12:00 PM

## 2022-01-31 NOTE — PROGRESS NOTES
Patient desating, down to 78%, Julia RT at bedside. RN remains at bedside.  Patient on 100% and PEEP of 8

## 2022-01-31 NOTE — PROGRESS NOTES
01/31/22 0531   Vent Information   Vent Type 980   Vent Mode AC/PC   Vt Ordered 0 mL   Pressure Ordered 20   Rate Set 24 bmp   Peak Flow 0 L/min   Pressure Support 0 cmH20   FiO2  100 %   SpO2 (!) 86 %   SpO2/FiO2 ratio 86   Sensitivity 3   PEEP/CPAP 6   I Time/ I Time % 1 s   Humidification Source HME   Nitric Oxide/Epoprostenol In Use? No   Vent Patient Data   High Peep/I Pressure 20   Peak Inspiratory Pressure 26 cmH2O   Mean Airway Pressure 16 cmH20   Rate Measured 24 br/min   Vt Exhaled 836 mL   Minute Volume 18.6 Liters   I:E Ratio 1.00:1   Cough/Sputum   Sputum How Obtained Endotracheal;Oral;Suctioned   Cough Non-productive;Dry   Frequency Infrequent   Sputum Amount None   Sputum Color None   Spontaneous Breathing Trial (SBT) RT Doc   Pulse 70   Breath Sounds   Right Upper Lobe Diminished   Right Middle Lobe Diminished   Right Lower Lobe Diminished   Left Upper Lobe Diminished   Left Lower Lobe Diminished   Additional Respiratory  Assessments   Resp 24   Position Semi-Phelps's   Oral Care Completed? No   Alarm Settings   High Pressure Alarm 45 cmH2O   Delay Alarm 20 sec(s)   Low Minute Volume Alarm 2.5 L/min   Apnea (secs) 20 secs   High Respiratory Rate 40 br/min   Low Exhaled Vt  250 mL   Patient Observation   Observations 8.0   ETT (adult)   Placement Date/Time: 01/23/22 (c) 0930   Preoxygenation: Yes  Technique: Video laryngoscopy  Type: Cuffed  Tube Size: (c) 8 mm  Laryngoscope: Mac  Blade Size: 4  Location: Oral  Grade View: Full view of the glottis  Insertion attempts: 1  Placement Ve. ..    Secured at 24 cm   Measured From Lips   ET Placement Left   Secured By Commercial tube jain   Site Condition Dry

## 2022-01-31 NOTE — PROGRESS NOTES
Dr Bhumi Power at bedside. O2 sats remain low, as low as 50's. Dr Debora Joy paged, multiple RT's at bedside. Attempted to prone patient x10 min patient remained low. In the low 40's at this time. Due to full code patient was flipped back to supine. Father at bedside and updated fully. Other family members in [address] [place] [address], Dr Bhumi Power went to speak with them and they agreed patient is a full code and they want all measures taken. Dr Bhumi Power back to bedside.

## 2022-02-01 NOTE — PROGRESS NOTES
Results for Sim Grayson" (MRN [id number]) as of 2/1/2022 01:55   Ref.  Range 1/31/2022 22:00   pH, Bld Latest Ref Range: 7.34 - 7.45  7.21 (L)   Base Excess Latest Ref Range: 0 - 3.3  5 (H)   O2 Sat Latest Ref Range: 96 - 97 % 74.4 (L)   Carbon Monoxide, Blood Latest Ref Range: 0 - 5 % 2.7   CO2 Content Latest Ref Range: 19 - 24 MMOL/L 25.8 (H)   pCO2, Arterial Latest Ref Range: 32 - 45 MMHG 60.0 (H)   pO2, Arterial Latest Ref Range: 75 - 100 MMHG 60 (L)   HCO3, Arterial Latest Ref Range: 18 - 23 MMOL/L 24.0 (H)   Methemoglobin, Arterial Latest Ref Range: <1.5 % 1.1   PC 22, f 20, 100%, +15 PEEP

## 2022-02-01 NOTE — PROGRESS NOTES
endotracheal tube, enteric tube, and left-sided PICC line.  Similar   bilateral airspace opacities.  No pleural effusion or pneumothorax identified   interval development of subcutaneous gas predominant along the right neck.           Impression   1. Interval development of subcutaneous gas predominant along the right neck. No definite pneumothorax identified. 2. No significant interval change in bilateral airspace opacities. ASSESMENT  Ac resp failure  covid pneumonia  Bact pneumonia        PLAN  1. cpm  2. Overall worsening clinical status,increasing o2 and vent requirement, probable significant lung damage secondary to covid,possible bact infection and secondary to high vent parameters like fio2 and peep. 3. Prognosis guarded  4.  Cont full vent support    2/1/2022  Burak Milan MD, M.D.

## 2022-02-01 NOTE — PROGRESS NOTES
[address] [facility]  consulted by Dr. Franco Wakefield for monitoring and adjustment. Indication for treatment: Pneumoniz (HAP)  Goal trough: [] 10-15 mcg/mL or [x] 15-20 mcg/ml  AUC/MANSOOR: [] <500 or [x] 400-600    Pertinent Laboratory Values:   Temp Readings from Last 3 Encounters:   02/01/22 98.7 °F (37.1 °C) (Axillary)   01/06/22 98.4 °F (36.9 °C) (Oral)   12/16/21 97.2 °F (36.2 °C) (Infrared)     Recent Labs     01/30/22  0233 01/31/22 0415 02/01/22  0455   WBC 25.2* 21.5* 37.1*     Recent Labs     01/30/22 0233 01/31/22 0415 02/01/22  0455   BUN 37* 37* 38*   CREATININE 0.6* 0.6* 0.7*     Estimated Creatinine Clearance: 145 mL/min (A) (based on SCr of 0.7 mg/dL (L)). Intake/Output Summary (Last 24 hours) at 2/1/2022 1126  Last data filed at 2/1/2022 0703  Gross per 24 hour   Intake 2952.23 ml   Output 2075 ml   Net 877.23 ml       Pertinent Cultures:  Date    Source    Results  2/1   Respiratory    Ordered              Vancomycin level:   TROUGH:  No results for input(s): VANCOTROUGH in the last 72 hours. RANDOM:  No results for input(s): VANCORANDOM in the last 72 hours. Assessment:  SCr, BUN, and urine output: WNL, low UOP data  Day(s) of therapy: 1  Vancomycin concentration: Pending    Plan:  Started on Vancomycin 1250mg IVPB every 12 hours  Predicted AUC = 456 and Trough = 13.9  Random level tomorrow AM  Pharmacy will continue to monitor patient and adjust therapy as indicated    VANCOMYCIN CONCENTRATION SCHEDULED FOR 2/2 @ 0600. Thank you for the consult.   Imtiaz Moreno, [facility]  2/1/2022 11:26 AM

## 2022-02-01 NOTE — PROGRESS NOTES
Pt's daughter at bed side and wanted to change to pt's code to [facility]. Dr. Miryam Thomas made aware.

## 2022-02-01 NOTE — PROCEDURES
ROUTINE ELECTROENCEPHALOGRAM    Identifying Information:  Name: Serjio Pretty  MRN: [id number]  : 1965  Interpreting Physician: Sanchez Dunaway DO  Referring Provider: Edil Rucker NP  Date of EE22  Procedure Location: Inpatient      Clinical History:  Serjio Pretty is a 64 y.o. male with altered mental status concerning for seizure. Current Medications:    rocuronium  1 mg/kg IntraVENous Once    acetylcysteine  2 mL Inhalation TID    tobramycin (PF)  300 mg Nebulization BID    lamoTRIgine  400 mg Oral BID    topiramate  350 mg Oral BID    dilTIAZem  30 mg Oral 4 times per day    insulin lispro  0-12 Units SubCUTAneous TID WC    insulin lispro  0-6 Units SubCUTAneous Nightly    methylPREDNISolone  60 mg IntraVENous Q6H    fluconazole  400 mg IntraVENous Q24H    pantoprazole  40 mg IntraVENous Daily    meropenem  1,000 mg IntraVENous Q8H    midodrine  10 mg Oral TID WC    cloBAZam  20 mg Oral BID    lidocaine PF  5 mL IntraDERmal Once    sodium chloride flush  5-40 mL IntraVENous 2 times per day    chlorhexidine  15 mL Mouth/Throat BID    famotidine (PEPCID) injection  20 mg IntraVENous BID    albuterol sulfate HFA  2 puff Inhalation Q4H WA    ipratropium  2 puff Inhalation 4x daily    [Held by provider] atorvastatin  80 mg Oral Nightly    sertraline  25 mg Oral Daily    ascorbic acid  500 mg Oral BID    guaiFENesin  600 mg Oral BID    sodium chloride flush  5-40 mL IntraVENous 2 times per day    Vitamin D  2,000 Units Oral Daily    influenza virus vaccine  0.5 mL IntraMUSCular Prior to discharge        Indication:  Rule out seizure/seizure disorder     Technical Summary:  28 channels of EEG were recorded in a digital format on a patient who is reported to be intubated and unresponsive during the recording. The patient was not sleep deprived prior to the EEG. The background consists of 4-6 Hz activity in the theta frequency range.   It is symmetric,

## 2022-02-01 NOTE — CARE COORDINATION
Pt transferred from CVICU [facility] to ICU 1/31. Pt remain on vent, Code status changed today from [facility] to [facility]. CM introduced self and offered to assist in any way possible. White board updated with CM name and pH#.

## 2022-02-01 NOTE — PLAN OF CARE
Problem: Falls - Risk of:  Goal: Will remain free from falls  Description: Will remain free from falls  Outcome: Ongoing  Goal: Absence of physical injury  Description: Absence of physical injury  Outcome: Ongoing     Problem: Skin Integrity:  Goal: Will show no infection signs and symptoms  Description: Will show no infection signs and symptoms  Outcome: Ongoing  Goal: Absence of new skin breakdown  Description: Absence of new skin breakdown  Outcome: Ongoing     Problem: Airway Clearance - Ineffective  Goal: Achieve or maintain patent airway  Outcome: Ongoing     Problem: Gas Exchange - Impaired  Goal: Absence of hypoxia  Outcome: Ongoing  Goal: Promote optimal lung function  Outcome: Ongoing     Problem: Breathing Pattern - Ineffective  Goal: Ability to achieve and maintain a regular respiratory rate  Outcome: Ongoing     Problem:  Body Temperature -  Risk of, Imbalanced  Goal: Ability to maintain a body temperature within defined limits  Outcome: Ongoing  Goal: Will regain or maintain usual level of consciousness  Outcome: Ongoing  Goal: Complications related to the disease process, condition or treatment will be avoided or minimized  Outcome: Ongoing     Problem: Nutrition Deficits  Goal: Optimize nutritional status  Outcome: Ongoing     Problem: Risk for Fluid Volume Deficit  Goal: Maintain normal heart rhythm  Outcome: Ongoing  Goal: Maintain absence of muscle cramping  Outcome: Ongoing  Goal: Maintain normal serum potassium, sodium, calcium, phosphorus, and pH  Outcome: Ongoing     Problem: Loneliness or Risk for Loneliness  Goal: Demonstrate positive use of time alone when socialization is not possible  Outcome: Ongoing     Problem: Fatigue  Goal: Verbalize increase energy and improved vitality  Outcome: Ongoing     Problem: Patient Education: Go to Patient Education Activity  Goal: Patient/Family Education  Outcome: Ongoing     Problem: Nutrition  Goal: Optimal nutrition therapy  Outcome: Ongoing Problem: Pain:  Goal: Pain level will decrease  Description: Pain level will decrease  Outcome: Ongoing  Goal: Control of acute pain  Description: Control of acute pain  Outcome: Ongoing  Goal: Control of chronic pain  Description: Control of chronic pain  Outcome: Ongoing

## 2022-02-01 NOTE — PROGRESS NOTES
Hospitalist Progress Note      Name:  Lorrie Fong /Age/Sex: 1965  (64 y.o. male)   MRN & CSN:  [id number] & [id number] Admission Date/Time: 2022  9:37 AM   Location:  -A PCP: Freddie Stephens MD         Hospital Day:     Assessment and Plan:   Lorrie Fong is a 64 y.o.  male  who presents with SOB    Assessment    -Acute hypoxic respiratory failure due to COVID 19 PNA  -COVID 19 PNA and Suspected gram -ve PNA  -New onset Paroxysmal A. Fib  -Septic Shock due to above  -Transaminitis secondary to COVID-19 (Lipitor and baricitinib discontinued)  -Acute on chronic anemia  -Seizure Disorder  -Hx of Craniotomy   -Depression    Plan:  Sputum Cx growing MDR Enterobacter cloacae and Candida albicans  Continue full vent support and sedation  Continue bronchodilators and wean ventilator as tolerated. ID on board; on Meropenem and Fluconazole  Pulm and Cardiology on board; [facility] on hold due to anemia  Discussed with patient's daughter (POA); requested code status to be changed to DNR CCA based on patient's wish. Prognosis is still very guarded; 35 mins of critical time spent excluding procedure time      Diet Diet NPO Exceptions are: Sips of Water with Meds  ADULT TUBE FEEDING; Orogastric; Peptide Based High Protein; Continuous; 10; Yes; 15; Q 4 hours; 60; 30; Q 4 hours   DVT Prophylaxis [] Lovenox, []  Heparin, [] SCDs, [] Ambulation   GI Prophylaxis [] PPI,  [] H2 Blocker,  [] Carafate,  [] Diet/Tube Feeds   Code Status DNR-CCA   Disposition TBD   [facility]      History of Present Illness:     Patient was seen and examined  On Mechanical ventilation and sedation  RASS of -5    Ten point ROS reviewed negative, unless as noted above    Objective:        Intake/Output Summary (Last 24 hours) at 2022 1453  Last data filed at 2022 0703  Gross per 24 hour   Intake 2952.23 ml   Output 2075 ml   Net 877.23 ml      Vitals:   Vitals:    22 1200   BP: 107/71   Pulse: 79   Resp: 22   Temp: 98 °F (36.7 °C)   SpO2: 90%     Physical Exam:   GEN On Mechanical ventilation and sedation  EYES Pupils are equally round. No scleral erythema, discharge, or conjunctivitis. HENT Mucous membranes are moist. Oral pharynx without exudates, no evidence of thrush. NECK Supple, no apparent thyromegaly or masses. RESP Clear to auscultation, no wheezes, rales or rhonchi. Symmetric chest movement while on mechanical ventilation  CARDIO/VASC S1/S2 auscultated. Regular rate without appreciable murmurs, rubs, or gallops. No JVD or carotid bruits. Peripheral pulses equal bilaterally and palpable. No peripheral edema. GI Abdomen is soft without significant tenderness, masses, or guarding. Bowel sounds are normoactive. Rectal exam deferred.  No costovertebral angle tenderness. Mcintyre catheter is present. HEME/LYMPH No palpable cervical lymphadenopathy and no hepatosplenomegaly. No petechiae or ecchymoses. MSK No gross joint deformities. SKIN Normal coloration, warm, dry.   NEURO Sedated with RASS of -5    Medications:   Medications:    meropenem and vaborbactam (VABOMERE) infusion  4 g IntraVENous Q8H    vancomycin  1,250 mg IntraVENous Q12H    rocuronium  1 mg/kg IntraVENous Once    acetylcysteine  2 mL Inhalation TID    lamoTRIgine  400 mg Oral BID    topiramate  350 mg Oral BID    dilTIAZem  30 mg Oral 4 times per day    insulin lispro  0-12 Units SubCUTAneous TID WC    insulin lispro  0-6 Units SubCUTAneous Nightly    methylPREDNISolone  60 mg IntraVENous Q6H    fluconazole  400 mg IntraVENous Q24H    pantoprazole  40 mg IntraVENous Daily    midodrine  10 mg Oral TID WC    cloBAZam  20 mg Oral BID    lidocaine PF  5 mL IntraDERmal Once    sodium chloride flush  5-40 mL IntraVENous 2 times per day    chlorhexidine  15 mL Mouth/Throat BID    famotidine (PEPCID) injection  20 mg IntraVENous BID    albuterol sulfate HFA  2 puff Inhalation Q4H WA    ipratropium 2 puff Inhalation 4x daily    [Held by provider] atorvastatin  80 mg Oral Nightly    sertraline  25 mg Oral Daily    ascorbic acid  500 mg Oral BID    guaiFENesin  600 mg Oral BID    sodium chloride flush  5-40 mL IntraVENous 2 times per day    Vitamin D  2,000 Units Oral Daily    influenza virus vaccine  0.5 mL IntraMUSCular Prior to discharge      Infusions:    vecuronium (NORCURON) infusion 1.7 mcg/kg/min (02/01/22 0515)    dextrose      norepinephrine Stopped (01/19/22 1706)    sodium chloride Stopped (01/15/22 0125)    propofol 40 mcg/kg/min (02/01/22 1259)    fentaNYL 62.5 mcg/hr (02/01/22 1428)    sodium chloride       PRN Meds: glucose, 15 g, PRN  dextrose, 12.5 g, PRN  glucagon (rDNA), 1 mg, PRN  dextrose, 100 mL/hr, PRN  potassium chloride, 40 mEq, PRN   Or  potassium alternative oral replacement, 40 mEq, PRN   Or  potassium chloride, 10 mEq, PRN  sodium chloride flush, 5-40 mL, PRN  sodium chloride, 25 mL, PRN  LORazepam, 1 mg, Q4H PRN  clonazePAM, 0.5 mg, BID PRN  bisacodyl, 10 mg, Daily PRN  sodium chloride flush, 5-40 mL, PRN  sodium chloride, 25 mL, PRN  ondansetron, 4 mg, Q8H PRN   Or  ondansetron, 4 mg, Q6H PRN  polyethylene glycol, 17 g, Daily PRN  acetaminophen, 650 mg, Q6H PRN   Or  acetaminophen, 650 mg, Q6H PRN          Electronically signed by Oscar Michelle MD on 2/1/2022 at 2:53 PM

## 2022-02-01 NOTE — PROGRESS NOTES
Infectious Disease Progress Note  2022   Patient Name: Yumiko Draper : 1965       Reason for visit: F/u COVID-19 pneumonia, acute respiratory failure? History:? Interval history noted  Intubated, sedated and on mechanical ventilation  Physical Exam:  Vital Signs: /70   Pulse 82   Temp 98.7 °F (37.1 °C) (Axillary)   Resp 20   Ht 6' (1.829 m)   Wt 221 lb 5.5 oz (100.4 kg)   SpO2 (!) 87%   BMI 30.02 kg/m²     Gen: intubated and sedated  Skin: no stigmata of endocarditis  Wounds: C/D/I  HEMT: AT/NC ETT OGT  Eyes: PERRLA. Neck: Supple. Trachea midline. No LAD. Chest:  transmitted breath sounds. Heart: RRR  Abd: soft, non-distended, no tenderness, no hepatomegaly. Normoactive bowel sounds. Ext: no clubbing, cyanosis, or edema  Catheter Site: without erythema or tenderness  LDA: left arm PICC , Urethral catheter;  Neuro: sedated and on the ventilator. Radiologic / Imaging / TESTING  ONE XRAY VIEW OF THE CHEST     2022 6:50 am     COMPARISON:   2022     HISTORY:   ORDERING SYSTEM PROVIDED HISTORY: intubated patient   TECHNOLOGIST PROVIDED HISTORY:   Reason for exam:->intubated patient   Reason for Exam: VENT     Follow-up exam     FINDINGS:   Endotracheal tube tip is 7.1 cm above the nicol. Multifocal airspace   opacities are not significantly changed. Lines and tubes are otherwise   stable in positioning. No pleural effusion. Stable cardiomediastinal   silhouette. No pneumothorax. Osseous structures otherwise stable. Impression:   Endotracheal tube tip is 7.1 cm above the nicol.   Otherwise no significant   interval change in bilateral pneumonia        Labs:    Recent Results (from the past 24 hour(s))   POCT Glucose    Collection Time: 22  9:15 AM   Result Value Ref Range    POC Glucose 158 (H) 70 - 99 MG/DL   POCT Glucose    Collection Time: 22  1:13 PM   Result Value Ref Range    POC Glucose 148 (H) 70 - 99 MG/DL   Blood gas, arterial    Collection Time: 01/31/22  5:00 PM   Result Value Ref Range    pH, Bld 7.24 (L) 7.34 - 7.45    pCO2, Arterial 57.0 (H) 32 - 45 MMHG    pO2, Arterial 51 (L) 75 - 100 MMHG    Base Excess 4 (H) 0 - 3.3    HCO3, Arterial 24.4 (H) 18 - 23 MMOL/L    CO2 Content 26.1 (H) 19 - 24 MMOL/L    O2 Sat 59.1 (L) 96 - 97 %    Carbon Monoxide, Blood 3.0 0 - 5 %    Methemoglobin, Arterial 0.0 <1.5 %    Comment ACPC R20 IC 25 100 +20    POCT Glucose    Collection Time: 01/31/22  6:33 PM   Result Value Ref Range    POC Glucose 171 (H) 70 - 99 MG/DL   POCT Glucose    Collection Time: 01/31/22  7:45 PM   Result Value Ref Range    POC Glucose 205 (H) 70 - 99 MG/DL   Blood gas, arterial    Collection Time: 01/31/22 10:00 PM   Result Value Ref Range    pH, Bld 7.21 (L) 7.34 - 7.45    pCO2, Arterial 60.0 (H) 32 - 45 MMHG    pO2, Arterial 60 (L) 75 - 100 MMHG    Base Excess 5 (H) 0 - 3.3    HCO3, Arterial 24.0 (H) 18 - 23 MMOL/L    CO2 Content 25.8 (H) 19 - 24 MMOL/L    O2 Sat 74.4 (L) 96 - 97 %    Carbon Monoxide, Blood 2.7 0 - 5 %    Methemoglobin, Arterial 1.1 <1.5 %    Comment ACPC 25,f22,100% ,PEEP14    POCT Glucose    Collection Time: 01/31/22 11:59 PM   Result Value Ref Range    POC Glucose 199 (H) 70 - 99 MG/DL   POCT Glucose    Collection Time: 02/01/22  3:43 AM   Result Value Ref Range    POC Glucose 180 (H) 70 - 99 MG/DL   Basic metabolic panel    Collection Time: 02/01/22  4:55 AM   Result Value Ref Range    Sodium 139 135 - 145 MMOL/L    Potassium 4.5 3.5 - 5.1 MMOL/L    Chloride 107 99 - 110 mMol/L    CO2 25 21 - 32 MMOL/L    Anion Gap 7 4 - 16    BUN 38 (H) 6 - 23 MG/DL    CREATININE 0.7 (L) 0.9 - 1.3 MG/DL    Glucose 147 (H) 70 - 99 MG/DL    Calcium 7.7 (L) 8.3 - 10.6 MG/DL    GFR Non-African American >60 >60 mL/min/1.73m2    GFR African American >60 >60 mL/min/1.73m2   CBC auto differential    Collection Time: 02/01/22  4:55 AM   Result Value Ref Range    WBC 37.1 (HH) 4.0 - 10.5 K/CU MM    RBC 2.70 (L) 4.6 - 6.2 M/CU MM    Hemoglobin 8.7 (L) 13.5 - 18.0 GM/DL    Hematocrit 29.4 (L) 42 - 52 %    .9 (H) 78 - 100 FL    MCH 32.2 (H) 27 - 31 PG    MCHC 29.6 (L) 32.0 - 36.0 %    RDW 18.0 (H) 11.7 - 14.9 %    Platelets 123 856 - 683 K/CU MM    MPV 10.0 7.5 - 11.1 FL   Blood gas, arterial    Collection Time: 02/01/22  6:00 AM   Result Value Ref Range    pH, Bld 7.31 (L) 7.34 - 7.45    pCO2, Arterial 52.0 (H) 32 - 45 MMHG    pO2, Arterial 62 (L) 75 - 100 MMHG    Base Excess 1 0 - 3.3    HCO3, Arterial 26.2 (H) 18 - 23 MMOL/L    CO2 Content 27.8 (H) 19 - 24 MMOL/L    O2 Sat 81.6 (L) 96 - 97 %    Carbon Monoxide, Blood 2.7 0 - 5 %    Methemoglobin, Arterial 0.9 <1.5 %    Comment PC22 RR20 100 P14    POCT Glucose    Collection Time: 02/01/22  8:03 AM   Result Value Ref Range    POC Glucose 131 (H) 70 - 99 MG/DL     CULTURE results: Invalid input(s): BLOOD CULTURE,  URINE CULTURE, SURGICAL CULTURE    Diagnosis:  Patient Active Problem List   Diagnosis    History of craniotomy    Malformation of cortical development of brain ([facility] [address]    Open wedge compression fracture of T11 vertebra with routine healing    Moderate episode of recurrent major depressive disorder (HCC)    Rhinitis, allergic    S/P placement of VNS (vagus nerve stimulation) device    Stenosis of right carotid artery    Symptomatic localization-related epilepsy (HCC)    HLD    Chronic right shoulder pain    Positive FIT (fecal immunochemical test)    Open wedge compression fracture of T12 vertebra with routine healing    Pneumonia due to COVID-19 virus    Acute respiratory failure with hypoxia (HCC)    Other seizures (HCC)       Active Problems  Active Problems:    Pneumonia due to COVID-19 virus    Acute respiratory failure with hypoxia (HCC)    Other seizures (HCC)  Resolved Problems:    * No resolved hospital problems.  *      Impression and plan   Summary and rationale: Patient is a 64 y.o.  male with a medical hx of HLD, seizure disorder, s/p craniotomy admitted for severe COVID-19 pneumonia, intubated on 1/9/2022. He has critical COVID-19 pneumonia with hyper inflammation. Respiratory culture positive for Enterobacter cloacae   Enterobacter cloacae superimposed bacterial infection.  COVID-19 symptom onset: 12/24/2021   COVID-19 test date: 1/27/2022   Expected discontinuation of isolation precautions: 1/13/2022   Clinical status: worsened, increase FiO2 up to 100%. Worsening COVID induced inflammation versus worsening bacterial infection or new bacterial infection.  Therapeutic:  o Ongoing antibiotics:  fluconazole 1/26. Hospitalist started inhaled tobramycin. D/c meropenem and tobramycin. Start Vabomere 2/1-  o Immunomodulators:   - Solu-Medrol: Pulse dosing, 3-day course of 1000 mg daily to be completed on 1/25/2022. . continue on Solu-Medrol 60 mg every 6 hours  o Completed antibiotics: Ceftriaxone, azithromycin, vancomycin 1/22-25,  o Other agents:    Diagnostic: Trend CRP. Repeat respiratory culture ordered on 2/1/2022.    F/u:   Other:       Electronically signed by: Electronically signed by Rosas Stevenson MD on 2/1/2022 at 8:18 AM

## 2022-02-01 NOTE — PROGRESS NOTES
Pt's daughter would like to set up a time to talk with Dr. Lee Orourke.  Dr. Lee Orourke contacted via [address] [place] [address]

## 2022-02-02 NOTE — PROGRESS NOTES
02/02/22 0329   Vent Information   Vent Type 840   Vent Mode AC/PC   Rate Set 22 bmp   FiO2  100 %   SpO2 (!) 85 %   SpO2/FiO2 ratio 85   Sensitivity 3   PEEP/CPAP 14   I Time/ I Time % 0.9 s   Humidification Source HME   Vent Patient Data   High Peep/I Pressure 18   Peak Inspiratory Pressure 33 cmH2O   Mean Airway Pressure 21 cmH20   Rate Measured 22 br/min   Vt Exhaled 508 mL   Minute Volume 11.1 Liters   I:E Ratio 1:2.0

## 2022-02-02 NOTE — PROGRESS NOTES
Comprehensive Nutrition Assessment    Type and Reason for Visit:  Reassess    Nutrition Recommendations/Plan:   Advance to goal rate of 60 ml/hr as tolerated  Will monitor GI tolerance, nutrition related labs, nutrition status, poc    Nutrition Assessment:  pt remains sedated on vent, EN infusing @ 40 ml/hr, noted code status change to [facility] today, will continue to follow at high nutrition risk    Malnutrition Assessment:  Malnutrition Status:  Insufficient data    Context:  Acute Illness       Estimated Daily Nutrient Needs:  Energy (kcal):  2057 ([facility] 2003b); Weight Used for Energy Requirements:  Current     Protein (g):  101-167 (1.2-2 g/kg IBW); Weight Used for Protein Requirements:  Ideal         Nutrition Related Findings:  POC glucose 131, 135, 137    Wounds:  None       Current Nutrition Therapies:    Current Tube Feeding (TF) Orders:  · Feeding Route: Orogastric  · Formula: Peptide Based High Protein  · Schedule: Continuous (60 ml/hr)  · Current TF & Flush Orders Provides: 1440 kcal and 126 g protein  Additional Calorie Sources:   Pt is receiving ~531 kcal from current propofol rate    Anthropometric Measures:  · Height: 6' 0.01\" (182.9 cm)  · Current Body Weight: 221 lb 5.5 oz (100.4 kg) ((1/30))   · Admission Body Weight: 175 lb 7.8 oz (79.6 kg) (first measured weight)    · Usual Body Weight: 188 lb 6.4 oz (85.5 kg) (10/21/21)   · Ideal Body Weight: 178 lbs; % Ideal Body Weight 124.3 %   · BMI: 30  · BMI Categories: Obese Class 1 (BMI 30.0-34. 9)       Nutrition Diagnosis:   · Inadequate oral intake related to acute injury/trauma as evidenced by NPO or clear liquid status due to medical condition    Nutrition Interventions:   Food and/or Nutrient Delivery:  Continue Current Tube Feeding  Nutrition Education/Counseling:  No recommendation at this time   Coordination of Nutrition Care:  Continue to monitor while inpatient    Goals:  Pt will meet greater than 75% of estimated nutrient needs via EN Nutrition Monitoring and Evaluation:   Behavioral-Environmental Outcomes:  None Identified   Food/Nutrient Intake Outcomes:  Enteral Nutrition Intake/Tolerance  Physical Signs/Symptoms Outcomes:  Biochemical Data,GI Status,Fluid Status or Edema,Weight,Skin,Hemodynamic Status     Discharge Planning:     Too soon to determine     Electronically signed by Kay Howard MS, RD, LD on 2/1/22 at 7:41 PM EST    Contact: [phone]

## 2022-02-02 NOTE — PROGRESS NOTES
pulmonary      SUBJECTIVE: intubated on vent     OBJECTIVE    VITALS:  /69   Pulse 96   Temp 96.8 °F (36 °C) (Rectal)   Resp 22   Ht 6' 0.01\" (1.829 m)   Wt 221 lb 5.5 oz (100.4 kg)   SpO2 (!) 86%   BMI 30.01 kg/m²   HEAD AND FACE EXAM:  No throat injection, no active exudate,no thrush  NECK EXAM;No JVD, no masses, symmetrical  CHEST EXAM; Expansion equal and symmetrical, no masses  LUNG EXAM; Good breath sounds bilaterally. There are expiratory wheezes both lungs, there are crackles at both lung bases  CARDIOVASCULAR EXAM: Positive S1 and S2, no S3 or S4, no clicks ,no murmurs  RIGHT AND LEFT LOWER EXTRIMITY EXAM: No edema, no swelling, no inflamation            LABS   Lab Results   Component Value Date    WBC 28.4 (H) 02/02/2022    HGB 9.0 (L) 02/02/2022    HCT 30.6 (L) 02/02/2022    .3 (H) 02/02/2022     02/02/2022     Lab Results   Component Value Date    CREATININE 0.6 (L) 02/02/2022    BUN 34 (H) 02/02/2022     02/02/2022    K 4.6 02/02/2022     02/02/2022    CO2 27 02/02/2022     No results found for: INR, PROTIME       Lab Results   Component Value Date    PHOS 4.2 01/25/2022        Recent Labs     01/31/22  2200 02/01/22  0600 02/02/22  0600   PH 7.21* 7.31* 7.26*   PO2ART 60* 62* 71*   HLB2PXM 60.0* 52.0* 64.0*   O2SAT 74.4* 81.6* 85.7*         Wt Readings from Last 3 Encounters:   01/30/22 221 lb 5.5 oz (100.4 kg)   01/06/22 185 lb (83.9 kg)   12/16/21 186 lb 9.6 oz (84.6 kg)               ASSESMENT  Ac resp failure  covid pneumonia  Bact pneumonia        PLAN  1. cpm  2. Cont full vent support  3.  Try weaning fio2 and peep if possible  4. cxr daily    2/2/2022  Pancho Mckeon MD, M.D.

## 2022-02-02 NOTE — CONSULTS
Palliative Care consult for patient for Goals of care. Patient presented to the ED  from home by EMS for SOB and lethargy. Pt's O2 sat was 77% on room air per EMS. He was placed on 6L NC with an O2 sat of 93%. He was reported to be Covid positive 2 weeks prior to admission and PCR on 1/7/22 confirmed. He has a past medical history hyperlipidemia, seizure disorder, status post craniotomy. He continued to decline after admission and had a rapid response on 1/9/22 for desaturation in the 60's tachycardia 140's on 100% FIO2 via BiPAP. The rapid response resulted in emergent intubation. He remains intubated since 1/9/22 with ETT exchange on 1/23/22. Today is day 24 on the vent and despite interventions he has not improved. His FIO2 is 100% and peep is 14. SPO2 sustaining  In the mid 80's. His WBC was 37.1 yesterday and 28.4 today. He is sedated with fentanyl,propofol and fentanyl and does not tolerate sedation weaning. He has increased respiratory effort and noted to desat. Neurology ws consulted and saw patient 1/30/22. The CT showed no acute findings 1/30/22. An EEG was performed and showed   The EEG was abnormal due to the presence of:   · Moderate to severe generalized slowing. This is a non-specific finding but is consistent with a generalized disturbance of cerebral function. It may be seen in a variety of conditions, such as toxic, metabolic, post-anoxic, multi-focal or diffuse structural abnormalities. No electrographic seizures or non-convulsive status epilepticus was seen over the entire monitoring period. I have spoken with patient's daughter Tracey Beck and she was able to relay a good understanding of her fathers condition. She stated that her and her sister Sam Brown would like to have a family meeting to discuss next steps. She stated her father was a very active man and struggled with independence issues d/t is epilepsy. He was unable to drive or maintain a job and this was hard for him.  He did enjoy riding his bike and was able to take care of himself. He has been living with his parents after his divorce with the girls mother for about a year. Brittanie Velazquez feels that patient would want to be comfortable and not continue with aggressive interventions. His code status was changed to [facility] yesterday. Family Meeting was scheduled for 1130. Family meeting held with patient's 2 daughters, both parents, 2 sisters. Patient's 3rd sister and ex wife were present via face time. I inquired about their basic understanding of what is going on with patient and they too seemed to be well informed. Discussed quality of life and inquired if he had expressed his medical wishes? His mother Sonia Flowers stated that he had expressed his love for his parents and let them know he was right with the lord. Both his parents are in agreement that he would not want a tracheostomy if it was even an approachable goal. His vent settings are currently too high to even consider. The family was emotional as anticipated but ultimately they came to a unanimous decision with withdraw care and keep him comfortable. I did discuss prognosis with  prior to family meeting and he was supportive of a compassionate extubation. Brittanie Velazquez and [facility] provided verbal consent to change their dads code status to [facility] and to proceed with a compassionate extubation later this afternoon. Patient's bedside RN's Rosio and Kellee Mcgee aware of code status change and request for withdraw of care.  will change code status to reflect wishes and place orders for terminal extubation. Emotional support provided to family.

## 2022-02-02 NOTE — PROGRESS NOTES
Pt time of death . No pulse/respirations. Verified by Thierry Nevarez RN/Emily FUNK. Family at bedside. Hospitalist notified at [phone].

## 2022-02-02 NOTE — PROGRESS NOTES
Hospitalist Progress Note      Name:  Nadia Gomez /Age/Sex: 1965  (64 y.o. male)   MRN & CSN:  [id number] & [id number] Admission Date/Time: 2022  9:37 AM   Location:  -A PCP: Dhruv Aviles MD         Hospital Day:     Assessment and Plan:   Nadia Gomez is a 64 y.o.  male  who presents with SOB    Assessment    -Acute hypoxic respiratory failure due to COVID 19 PNA  -COVID 19 PNA and Suspected gram -ve PNA  -New onset Paroxysmal A. Fib  -Septic Shock due to above  -Transaminitis secondary to COVID-19 (Lipitor and baricitinib discontinued)  -Acute on chronic anemia  -Seizure Disorder  -Hx of Craniotomy   -Depression    Plan:  Sputum Cx growing MDR Enterobacter cloacae and Candida albicans  Continue full vent support and sedation  Continue bronchodilators and wean ventilator as tolerated. ID on board; on Meropenem and Fluconazole  Pulm and Cardiology on board; [facility] on hold due to anemia  Discussed with patient's daughter (POA); requested code status to be changed to DNR CCA based on patient's wish. Family had meeting with palliative today; decided on DNR CC and compassionate extubation   Prognosis is still very guarded; 35 mins of critical time spent excluding procedure time       Diet Diet NPO Exceptions are: Sips of Water with Meds  ADULT TUBE FEEDING; Orogastric; Peptide Based High Protein; Continuous; 10; Yes; 15; Q 4 hours; 60; 30; Q 4 hours   DVT Prophylaxis [] Lovenox, []  Heparin, [] SCDs, [] Ambulation   GI Prophylaxis [] PPI,  [] H2 Blocker,  [] Carafate,  [] Diet/Tube Feeds   Code Status DNR-CCA   Disposition TBD   MDM      History of Present Illness:     Patient was seen and examined  On Mechanical ventilation and sedation  RASS of -5    Ten point ROS reviewed negative, unless as noted above    Objective:        Intake/Output Summary (Last 24 hours) at 2022 1243  Last data filed at 2022 0731  Gross per 24 hour Intake 3466.33 ml   Output 1800 ml   Net 1666.33 ml      Vitals:   Vitals:    02/02/22 1200   BP: 116/76   Pulse: 93   Resp: 22   Temp:    SpO2: (!) 89%     Physical Exam:   GEN On Mechanical ventilation and sedation  EYES Pupils are equally round. No scleral erythema, discharge, or conjunctivitis. HENT Mucous membranes are moist. Oral pharynx without exudates, no evidence of thrush. NECK Supple, no apparent thyromegaly or masses. RESP Clear to auscultation, no wheezes, rales or rhonchi. Symmetric chest movement while on mechanical ventilation  CARDIO/VASC S1/S2 auscultated. Regular rate without appreciable murmurs, rubs, or gallops. No JVD or carotid bruits. Peripheral pulses equal bilaterally and palpable. No peripheral edema. GI Abdomen is soft without significant tenderness, masses, or guarding. Bowel sounds are normoactive. Rectal exam deferred.  No costovertebral angle tenderness. Mcintyre catheter is present. HEME/LYMPH No palpable cervical lymphadenopathy and no hepatosplenomegaly. No petechiae or ecchymoses. MSK No gross joint deformities. SKIN Normal coloration, warm, dry.   NEURO Sedated with RASS of -5    Medications:   Medications:    meropenem and vaborbactam (VABOMERE) infusion  4 g IntraVENous Q8H    vancomycin  1,250 mg IntraVENous Q12H    rocuronium  1 mg/kg IntraVENous Once    acetylcysteine  2 mL Inhalation TID    lamoTRIgine  400 mg Oral BID    topiramate  350 mg Oral BID    dilTIAZem  30 mg Oral 4 times per day    insulin lispro  0-12 Units SubCUTAneous TID     insulin lispro  0-6 Units SubCUTAneous Nightly    methylPREDNISolone  60 mg IntraVENous Q6H    fluconazole  400 mg IntraVENous Q24H    pantoprazole  40 mg IntraVENous Daily    midodrine  10 mg Oral TID WC    cloBAZam  20 mg Oral BID    lidocaine PF  5 mL IntraDERmal Once    sodium chloride flush  5-40 mL IntraVENous 2 times per day    chlorhexidine  15 mL Mouth/Throat BID    famotidine (PEPCID) injection 20 mg IntraVENous BID    albuterol sulfate HFA  2 puff Inhalation Q4H WA    ipratropium  2 puff Inhalation 4x daily    [Held by provider] atorvastatin  80 mg Oral Nightly    sertraline  25 mg Oral Daily    ascorbic acid  500 mg Oral BID    guaiFENesin  600 mg Oral BID    sodium chloride flush  5-40 mL IntraVENous 2 times per day    Vitamin D  2,000 Units Oral Daily    influenza virus vaccine  0.5 mL IntraMUSCular Prior to discharge      Infusions:    cisatracurium (NIMBEX) infusion 3 mcg/kg/min (02/02/22 1146)    dextrose      norepinephrine Stopped (01/19/22 1706)    sodium chloride Stopped (01/15/22 0125)    propofol 50 mcg/kg/min (02/02/22 0901)    fentaNYL 62.5 mcg/hr (02/02/22 0853)    sodium chloride       PRN Meds: glucose, 15 g, PRN  dextrose, 12.5 g, PRN  glucagon (rDNA), 1 mg, PRN  dextrose, 100 mL/hr, PRN  potassium chloride, 40 mEq, PRN   Or  potassium alternative oral replacement, 40 mEq, PRN   Or  potassium chloride, 10 mEq, PRN  sodium chloride flush, 5-40 mL, PRN  sodium chloride, 25 mL, PRN  LORazepam, 1 mg, Q4H PRN  clonazePAM, 0.5 mg, BID PRN  bisacodyl, 10 mg, Daily PRN  sodium chloride flush, 5-40 mL, PRN  sodium chloride, 25 mL, PRN  ondansetron, 4 mg, Q8H PRN   Or  ondansetron, 4 mg, Q6H PRN  polyethylene glycol, 17 g, Daily PRN  acetaminophen, 650 mg, Q6H PRN   Or  acetaminophen, 650 mg, Q6H PRN          Electronically signed by Krzysztof Awad MD on 2/2/2022 at 12:43 PM

## 2022-02-02 NOTE — PROGRESS NOTES
02/02/22 0749   Vent Information   $Ventilation $Subsequent Day   Vent Type 840   Vent Mode AC/PC   Pressure Ordered 18   Rate Set 22 bmp   FiO2  100 %   SpO2 (!) 86 %   SpO2/FiO2 ratio 86   Sensitivity 3   PEEP/CPAP 14   Humidification Source HME   Nitric Oxide/Epoprostenol In Use? No   Vent Patient Data   Peak Inspiratory Pressure 33 cmH2O   Mean Airway Pressure 21 cmH20   Rate Measured 22 br/min   Vt Exhaled 518 mL   Minute Volume 11.4 Liters   I:E Ratio 1:2.0   Plateau Pressure 30 TUE26   Static Compliance 34 mL/cmH2O   Total PEEP 15 cmH20   Auto PEEP 0.4 cmH20   Spontaneous Breathing Trial (SBT) RT Doc   Pulse 95   Additional Respiratory  Assessments   Resp 22   Position Semi-Phelps's   Oral Care Completed? No   Alarm Settings   High Pressure Alarm 45 cmH2O   Delay Alarm 20 sec(s)   Low Minute Volume Alarm 2.5 L/min   Apnea (secs) 20 secs   High Respiratory Rate 45 br/min   Low Exhaled Vt  250 mL   ETT (adult)   Placement Date/Time: 01/23/22 (c 0930   Preoxygenation: Yes  Technique: Video laryngoscopy  Type: Cuffed  Tube Size: (c) 8 mm  Laryngoscope: Mac  Blade Size: 4  Location: Oral  Grade View: Full view of the glottis  Insertion attempts: 1  Placement Ve. ..    Secured at 24 cm   Measured From Lips   ET Placement Right   Secured By Commercial tube jain   Site Condition Cool;Dry   Cuff Pressure   (mov)

## 2022-02-03 LAB
CULTURE: ABNORMAL
CULTURE: ABNORMAL
Lab: ABNORMAL
SPECIMEN: ABNORMAL

## 2022-02-03 NOTE — DISCHARGE SUMMARY
Discharge Summary    Name:  Kaiden Ernandez /Age/Sex: 1965  (64 y.o. male)   MRN & CSN:  [id number] & [id number] Admission Date/Time: 2022  9:37 AM   Attending:  No att. providers found Discharging Physician: Ayan Gillespie MD     Hospital Course:   Kaiden Ernandez is a 64 y.o.  male  who presents with SOB    Patient  at 14:02 pm on 2022 after family decided to pursue compassionate extubation. Conditions managed during course of hospitalization:     Assessment    -Acute hypoxic respiratory failure due to COVID 19 PNA  -COVID 19 PNA and Suspected gram -ve PNA  -New onset Paroxysmal A. Fib  -Septic Shock due to above  -Transaminitis secondary to COVID-19 (Lipitor and baricitinib discontinued)  -Acute on chronic anemia  -Seizure Disorder  -Hx of Craniotomy   -Depression     Plan:  Sputum Cx growing MDR Enterobacter cloacae and Candida albicans  On bronchodilators and wean ventilator as tolerated. ID on board; on Meropenem and Fluconazole  Pulm and Cardiology on board; [facility] on hold due to anemia  Discussed with patient's daughter (POA); requested code status to be changed to DNR CCA based on patient's wish.   Family had meeting with palliative today; decided on DNR CC and compassionate extubation             Consults this admission:  IP CONSULT TO HOSPITALIST  IP CONSULT TO PHARMACY  IP CONSULT TO CASE MANAGEMENT  IP CONSULT TO PULMONOLOGY  IP CONSULT TO DIETITIAN  IP CONSULT TO IV TEAM  IP CONSULT TO DIETITIAN  IP CONSULT TO INFECTIOUS DISEASES  IP CONSULT TO PULMONOLOGY  IP CONSULT TO CARDIOLOGY  PALLIATIVE CARE EVAL  IP CONSULT TO NEUROLOGY  IP CONSULT TO PALLIATIVE CARE  IP CONSULT TO PHARMACY  PHARMACY TO [address]    Discharge Instruction:   Dceased    Discharge Medications:        Medication List      ASK your doctor about these medications    albuterol sulfate  (90 Base) MCG/ACT inhaler  Commonly known as: Ventolin HFA  Inhale 2 puffs into the lungs 4 times daily as needed for Wheezing     ascorbic acid 500 MG tablet  Commonly known as: VITAMIN C  Take 1 tablet by mouth 2 times daily for 14 days     atorvastatin 80 MG tablet  Commonly known as: LIPITOR  Take 1 tablet by mouth daily     bisacodyl 5 MG EC tablet  Generic drug: bisacodyl  Take 4 tablets once for colonoscopy prep     cloBAZam 20 MG Tabs     clonazePAM 0.5 MG disintegrating tablet  Commonly known as: KLONOPIN     guaiFENesin 600 MG extended release tablet  Commonly known as: MUCINEX  Take 1 tablet by mouth 2 times daily for 10 days  Ask about: Should I take this medication?      LaMICtal  MG Tb24 extended release tablet  Generic drug: lamoTRIgine  Ask about: Which instructions should I use?     melatonin 3 MG Tabs tablet  Take 1 tablet by mouth nightly for 14 days     sertraline 25 MG tablet  Commonly known as: ZOLOFT  Take 1 tablet by mouth daily     topiramate 200 MG tablet  Commonly known as: TOPAMAX     vitamin D3 25 MCG (1000 UT) Tabs tablet  Commonly known as: CHOLECALCIFEROL  Take 1 tablet by mouth daily            Objective Findings at Discharge:   /79   Pulse 88   Temp 98.6 °F (37 °C)   Resp (!) 0   Ht 6' 0.01\" (1.829 m)   Wt 221 lb 5.5 oz (100.4 kg)   SpO2 90%   BMI 30.01 kg/m²              Examined earlier in the day    BMP/CBC  Recent Labs     22  0455 22  0440    143   K 4.5 4.6    108   CO2 25 27   BUN 38* 34*   CREATININE 0.7* 0.6*   WBC 37.1* 28.4*   HCT 29.4* 30.6*    354       IMAGING:  As above    Electronically signed by Guille Vicente MD on 2/3/2022 at 7:45 AM

## 2023-01-13 NOTE — PROGRESS NOTES
01/29/22 0312   Vent Information   Vent Type 980   Vent Mode AC/PC   Vt Ordered 0 mL   Pressure Ordered 20   Rate Set 24 bmp   Peak Flow 0 L/min   Pressure Support 0 cmH20   FiO2  85 %   SpO2 91 %   SpO2/FiO2 ratio 107.06   Sensitivity 3   PEEP/CPAP 11   I Time/ I Time % 0 s   Humidification Source HME   Nitric Oxide/Epoprostenol In Use? No   Vent Patient Data   High Peep/I Pressure 20   Peak Inspiratory Pressure 32 cmH2O   Mean Airway Pressure 18 cmH20   Rate Measured 24 br/min   Vt Exhaled 695 mL   Minute Volume 16.6 Liters   I:E Ratio 1:2.10   Cough/Sputum   Sputum How Obtained Endotracheal   Sputum Amount Small   Sputum Color Cloudy   Tenacity Thin   Spontaneous Breathing Trial (SBT) RT Doc   Pulse 70   Breath Sounds   Right Upper Lobe Diminished   Right Middle Lobe Diminished   Right Lower Lobe Diminished   Left Upper Lobe Diminished   Left Lower Lobe Diminished   Additional Respiratory  Assessments   Resp 24   Position Semi-Phelps's   Oral Care Completed? No   Subglottic Suction Done? Yes   Alarm Settings   High Pressure Alarm 45 cmH2O   Low Minute Volume Alarm 2.5 L/min   Apnea (secs) 20 secs   High Respiratory Rate 40 br/min   Low Exhaled Vt  250 mL   ETT (adult)   Placement Date/Time: 01/23/22 (c) 9151   Preoxygenation: Yes  Technique: Video laryngoscopy  Type: Cuffed  Tube Size: (c) 8 mm  Laryngoscope: Mac  Blade Size: 4  Location: Oral  Grade View: Full view of the glottis  Insertion attempts: 1  Placement Ve. ..    Secured at 24 cm   Measured From [address] [place] [address],[address] By Commercial tube jain   Site Condition Dry   Cuff Pressure   (mov) Result note sent on Speclet

## 2023-08-22 NOTE — PROGRESS NOTES
Pt called asking if you can prescribe something else for him. Stated finished amoxil this morning, and doesn't seem to be getting any better. Stated he still has bad cough in morning especially and has a lot of mucus. I did mention about taking a covid test since we have seen and increase.  Please advise pulmonary      SUBJECTIVE: worsening clinical status increasing vent requirement,frequent mucus plugging     OBJECTIVE    VITALS:  /85   Pulse 123   Temp 98.4 °F (36.9 °C) (Rectal)   Resp 26   Ht 6' (1.829 m)   Wt 175 lb 7.8 oz (79.6 kg)   SpO2 94%   BMI 23.80 kg/m²   HEAD AND FACE EXAM:  No throat injection, no active exudate,no thrush  NECK EXAM;No JVD, no masses, symmetrical  CHEST EXAM; Expansion equal and symmetrical, no masses  LUNG EXAM; Good breath sounds bilaterally. There are expiratory wheezes both lungs, there are crackles at both lung bases  CARDIOVASCULAR EXAM: Positive S1 and S2, no S3 or S4, no clicks ,no murmurs  RIGHT AND LEFT LOWER EXTRIMITY EXAM: No edema, no swelling, no inflamation            LABS   Lab Results   Component Value Date    WBC 13.8 (H) 01/16/2022    HGB 9.5 (L) 01/16/2022    HCT 31.3 (L) 01/16/2022    .6 (H) 01/16/2022     01/16/2022     Lab Results   Component Value Date    CREATININE 1.1 01/16/2022    BUN 60 (H) 01/16/2022     (H) 01/16/2022    K 3.5 01/16/2022     (H) 01/16/2022    CO2 23 01/16/2022     No results found for: INR, PROTIME     No results found for: PHOS     Recent Labs     01/15/22  0600 01/16/22  0600 01/16/22  1100   PH 7.40 7.47* 7.31*   PO2ART 64* 55* 49*   WDW9PII 36.0 29.0* 46.0*   O2SAT 89.9* 87.9* 76.2*         Wt Readings from Last 3 Encounters:   01/13/22 175 lb 7.8 oz (79.6 kg)   01/06/22 185 lb (83.9 kg)   12/16/21 186 lb 9.6 oz (84.6 kg)        EXAMINATION:   ONE XRAY VIEW OF THE CHEST       1/16/2022 6:08 am       COMPARISON:   01/15/2022       HISTORY:   ORDERING SYSTEM PROVIDED HISTORY: fu pneumonia,on vent   TECHNOLOGIST PROVIDED HISTORY:   Reason for exam:->fu pneumonia,on vent   Reason for Exam: vent   Additional signs and symptoms: pneumonia       FINDINGS:   An endotracheal tube, enteric tube and left PICC are stable in appearance.    There is persistent but improving multifocal airspace consolidation within   the upper and lower lobes. Lei Pete is no pleural effusion or pneumothorax.           Impression   Persistent but improving multifocal airspace consolidation, consistent with   COVID-19 pneumonia. ASSESMENT  Ac resp failure  covid pneumonia  Bact pneumonia        PLAN  1. cpm  2. cxont full vent support  3.  Overall deterioration in overall pt condition    1/16/2022  Luis Eduardo Mckeon MD, MMARTHA.